# Patient Record
Sex: FEMALE | Race: WHITE | NOT HISPANIC OR LATINO | Employment: PART TIME | URBAN - METROPOLITAN AREA
[De-identification: names, ages, dates, MRNs, and addresses within clinical notes are randomized per-mention and may not be internally consistent; named-entity substitution may affect disease eponyms.]

---

## 2018-06-07 ENCOUNTER — APPOINTMENT (EMERGENCY)
Dept: CT IMAGING | Facility: HOSPITAL | Age: 53
End: 2018-06-07
Payer: COMMERCIAL

## 2018-06-07 ENCOUNTER — HOSPITAL ENCOUNTER (EMERGENCY)
Facility: HOSPITAL | Age: 53
Discharge: HOME | End: 2018-06-07
Attending: EMERGENCY MEDICINE | Admitting: EMERGENCY MEDICINE
Payer: COMMERCIAL

## 2018-06-07 VITALS
SYSTOLIC BLOOD PRESSURE: 130 MMHG | HEART RATE: 72 BPM | TEMPERATURE: 98.4 F | BODY MASS INDEX: 30.34 KG/M2 | OXYGEN SATURATION: 99 % | DIASTOLIC BLOOD PRESSURE: 71 MMHG | RESPIRATION RATE: 18 BRPM | WEIGHT: 177.69 LBS | HEIGHT: 64 IN

## 2018-06-07 DIAGNOSIS — K57.92 ACUTE DIVERTICULITIS: Primary | ICD-10-CM

## 2018-06-07 LAB
ALBUMIN SERPL BCP-MCNC: 3.9 G/DL (ref 3.5–5)
ALP SERPL-CCNC: 91 U/L (ref 46–116)
ALT SERPL W P-5'-P-CCNC: 26 U/L (ref 12–78)
ANION GAP SERPL CALCULATED.3IONS-SCNC: 8 MMOL/L (ref 4–13)
AST SERPL W P-5'-P-CCNC: 13 U/L (ref 5–45)
BACTERIA UR QL AUTO: ABNORMAL /HPF
BASOPHILS # BLD AUTO: 0.02 THOUSANDS/ΜL (ref 0–0.1)
BASOPHILS NFR BLD AUTO: 0 % (ref 0–1)
BILIRUB SERPL-MCNC: 0.3 MG/DL (ref 0.2–1)
BILIRUB UR QL STRIP: NEGATIVE
BUN SERPL-MCNC: 20 MG/DL (ref 5–25)
CALCIUM SERPL-MCNC: 9.4 MG/DL (ref 8.3–10.1)
CHLORIDE SERPL-SCNC: 105 MMOL/L (ref 100–108)
CLARITY UR: CLEAR
CO2 SERPL-SCNC: 29 MMOL/L (ref 21–32)
COLOR UR: YELLOW
CREAT SERPL-MCNC: 0.86 MG/DL (ref 0.6–1.3)
EOSINOPHIL # BLD AUTO: 0.12 THOUSAND/ΜL (ref 0–0.61)
EOSINOPHIL NFR BLD AUTO: 2 % (ref 0–6)
ERYTHROCYTE [DISTWIDTH] IN BLOOD BY AUTOMATED COUNT: 12.7 % (ref 11.6–15.1)
GFR SERPL CREATININE-BSD FRML MDRD: 78 ML/MIN/1.73SQ M
GLUCOSE SERPL-MCNC: 94 MG/DL (ref 65–140)
GLUCOSE UR STRIP-MCNC: NEGATIVE MG/DL
HCT VFR BLD AUTO: 37.3 % (ref 34.8–46.1)
HGB BLD-MCNC: 12.5 G/DL (ref 11.5–15.4)
HGB UR QL STRIP.AUTO: ABNORMAL
KETONES UR STRIP-MCNC: NEGATIVE MG/DL
LEUKOCYTE ESTERASE UR QL STRIP: NEGATIVE
LYMPHOCYTES # BLD AUTO: 1.23 THOUSANDS/ΜL (ref 0.6–4.47)
LYMPHOCYTES NFR BLD AUTO: 17 % (ref 14–44)
MCH RBC QN AUTO: 29.8 PG (ref 26.8–34.3)
MCHC RBC AUTO-ENTMCNC: 33.5 G/DL (ref 31.4–37.4)
MCV RBC AUTO: 89 FL (ref 82–98)
MONOCYTES # BLD AUTO: 0.53 THOUSAND/ΜL (ref 0.17–1.22)
MONOCYTES NFR BLD AUTO: 7 % (ref 4–12)
NEUTROPHILS # BLD AUTO: 5.26 THOUSANDS/ΜL (ref 1.85–7.62)
NEUTS SEG NFR BLD AUTO: 73 % (ref 43–75)
NITRITE UR QL STRIP: NEGATIVE
NON-SQ EPI CELLS URNS QL MICRO: ABNORMAL /HPF
PH UR STRIP.AUTO: 7 [PH] (ref 4.5–8)
PLATELET # BLD AUTO: 256 THOUSANDS/UL (ref 149–390)
PMV BLD AUTO: 9.7 FL (ref 8.9–12.7)
POTASSIUM SERPL-SCNC: 4.1 MMOL/L (ref 3.5–5.3)
PROT SERPL-MCNC: 7.6 G/DL (ref 6.4–8.2)
PROT UR STRIP-MCNC: NEGATIVE MG/DL
RBC # BLD AUTO: 4.2 MILLION/UL (ref 3.81–5.12)
RBC #/AREA URNS AUTO: ABNORMAL /HPF
SODIUM SERPL-SCNC: 142 MMOL/L (ref 136–145)
SP GR UR STRIP.AUTO: 1.01 (ref 1–1.03)
UROBILINOGEN UR QL STRIP.AUTO: 0.2 E.U./DL
WBC # BLD AUTO: 7.16 THOUSAND/UL (ref 4.31–10.16)
WBC #/AREA URNS AUTO: ABNORMAL /HPF

## 2018-06-07 PROCEDURE — 85025 COMPLETE CBC W/AUTO DIFF WBC: CPT | Performed by: EMERGENCY MEDICINE

## 2018-06-07 PROCEDURE — 81001 URINALYSIS AUTO W/SCOPE: CPT

## 2018-06-07 PROCEDURE — 99284 EMERGENCY DEPT VISIT MOD MDM: CPT

## 2018-06-07 PROCEDURE — 80053 COMPREHEN METABOLIC PANEL: CPT | Performed by: EMERGENCY MEDICINE

## 2018-06-07 PROCEDURE — 36415 COLL VENOUS BLD VENIPUNCTURE: CPT | Performed by: EMERGENCY MEDICINE

## 2018-06-07 PROCEDURE — 74177 CT ABD & PELVIS W/CONTRAST: CPT

## 2018-06-07 PROCEDURE — 96374 THER/PROPH/DIAG INJ IV PUSH: CPT

## 2018-06-07 RX ORDER — LEVOFLOXACIN 500 MG/1
500 TABLET, FILM COATED ORAL DAILY
Qty: 7 TABLET | Refills: 0 | Status: SHIPPED | OUTPATIENT
Start: 2018-06-07 | End: 2018-06-14

## 2018-06-07 RX ORDER — KETOROLAC TROMETHAMINE 30 MG/ML
15 INJECTION, SOLUTION INTRAMUSCULAR; INTRAVENOUS ONCE
Status: COMPLETED | OUTPATIENT
Start: 2018-06-07 | End: 2018-06-07

## 2018-06-07 RX ORDER — METRONIDAZOLE 500 MG/1
500 TABLET ORAL 3 TIMES DAILY
Qty: 21 TABLET | Refills: 0 | Status: SHIPPED | OUTPATIENT
Start: 2018-06-07 | End: 2018-06-14

## 2018-06-07 RX ORDER — TRAMADOL HYDROCHLORIDE 50 MG/1
50 TABLET ORAL EVERY 6 HOURS PRN
Qty: 15 TABLET | Refills: 0 | Status: SHIPPED | OUTPATIENT
Start: 2018-06-07 | End: 2018-08-07

## 2018-06-07 RX ADMIN — KETOROLAC TROMETHAMINE 15 MG: 30 INJECTION, SOLUTION INTRAMUSCULAR at 20:19

## 2018-06-07 RX ADMIN — IOHEXOL 100 ML: 350 INJECTION, SOLUTION INTRAVENOUS at 20:58

## 2018-06-08 NOTE — ED PROVIDER NOTES
History  Chief Complaint   Patient presents with    Abdominal Pain     HX of diverticulitis, +nausea, left ABD pain x 3day     49-year-old female presents with chief complaint of left lower quadrant abdominal pain  Onset was yesterday  Patient states she has a history of diverticulitis but this feels a little bit worse  Patient did take a dose of levofloxacin and metronidazole at home because she unfortunately has multiple flares of diverticulitis  Patient states she did not eat anything that will kick off for diverticulitis  No back pain  No urinary complaints  Patient has had some nausea but no vomiting  History provided by:  Patient   used: No    Abdominal Pain   Pain location:  LLQ  Pain quality: aching    Pain radiates to:  Does not radiate  Pain severity:  Moderate  Onset quality:  Gradual  Duration:  2 days  Timing:  Constant  Progression:  Worsening  Chronicity:  New  Relieved by:  Nothing  Worsened by:  Nothing  Ineffective treatments: one dose of antibiotics  Associated symptoms: nausea    Associated symptoms: no anorexia, no chest pain, no chills, no constipation, no diarrhea, no dysuria, no fever, no shortness of breath and no vomiting        None       Past Medical History:   Diagnosis Date    Diverticulitis        Past Surgical History:   Procedure Laterality Date    BREAST IMPLANT       SECTION       SECTION      EYE SURGERY      biklat eyelid surgery    HYSTERECTOMY      LAPAROSCOPIC LYSIS INTESTINAL ADHESIONS         History reviewed  No pertinent family history  I have reviewed and agree with the history as documented  Social History   Substance Use Topics    Smoking status: Never Smoker    Smokeless tobacco: Never Used    Alcohol use No        Review of Systems   Constitutional: Negative for chills, diaphoresis and fever  Respiratory: Negative for shortness of breath  Cardiovascular: Negative for chest pain and palpitations  Gastrointestinal: Positive for abdominal pain and nausea  Negative for anorexia, constipation, diarrhea and vomiting  Genitourinary: Negative for dysuria and frequency  Skin: Negative for rash  All other systems reviewed and are negative  Physical Exam  Physical Exam   Constitutional: She is oriented to person, place, and time  She appears well-developed and well-nourished  She appears distressed (mild)  HENT:   Head: Normocephalic and atraumatic  Eyes: EOM are normal  Pupils are equal, round, and reactive to light  Neck: Normal range of motion  No JVD present  Cardiovascular: Normal rate, regular rhythm, normal heart sounds and intact distal pulses  Exam reveals no gallop and no friction rub  No murmur heard  Pulmonary/Chest: Effort normal and breath sounds normal  No respiratory distress  She has no wheezes  She has no rales  She exhibits no tenderness  Abdominal: Soft  She exhibits no distension  There is tenderness (left lower quadrant)  There is no guarding  Musculoskeletal: Normal range of motion  She exhibits no tenderness  Neurological: She is alert and oriented to person, place, and time  Skin: Skin is warm and dry  Psychiatric: She has a normal mood and affect  Her behavior is normal  Judgment and thought content normal    Nursing note and vitals reviewed        Vital Signs  ED Triage Vitals [06/07/18 1921]   Temperature Pulse Respirations Blood Pressure SpO2   98 4 °F (36 9 °C) 98 18 140/75 98 %      Temp Source Heart Rate Source Patient Position - Orthostatic VS BP Location FiO2 (%)   Oral Monitor Sitting Left arm --      Pain Score       8           Vitals:    06/07/18 1921 06/07/18 2110 06/07/18 2139   BP: 140/75 125/73 130/71   Pulse: 98 71 72   Patient Position - Orthostatic VS: Sitting Sitting Sitting       Visual Acuity      ED Medications  Medications   ketorolac (TORADOL) injection 15 mg (15 mg Intravenous Given 6/7/18 2019)   iohexol (OMNIPAQUE) 350 MG/ML injection (MULTI-DOSE) 100 mL (100 mL Intravenous Given 6/7/18 2058)       Diagnostic Studies  Results Reviewed     Procedure Component Value Units Date/Time    Comprehensive metabolic panel [[id number]] Collected:  06/07/18 2022    Lab Status:  Final result Specimen:  Blood from Arm, Right Updated:  06/07/18 2049     Sodium 142 mmol/L      Potassium 4 1 mmol/L      Chloride 105 mmol/L      CO2 29 mmol/L      Anion Gap 8 mmol/L      BUN 20 mg/dL      Creatinine 0 86 mg/dL      Glucose 94 mg/dL      Calcium 9 4 mg/dL      AST 13 U/L      ALT 26 U/L      Alkaline Phosphatase 91 U/L      Total Protein 7 6 g/dL      Albumin 3 9 g/dL      Total Bilirubin 0 30 mg/dL      eGFR 78 ml/min/1 73sq m     Narrative:         National Kidney Disease Education Program recommendations are as follows:  GFR calculation is accurate only with a steady state creatinine  Chronic Kidney disease less than 60 ml/min/1 73 sq  meters  Kidney failure less than 15 ml/min/1 73 sq  meters      CBC and differential [[id number]]  (Normal) Collected:  06/07/18 2022    Lab Status:  Final result Specimen:  Blood from Arm, Right Updated:  06/07/18 2027     WBC 7 16 Thousand/uL      RBC 4 20 Million/uL      Hemoglobin 12 5 g/dL      Hematocrit 37 3 %      MCV 89 fL      MCH 29 8 pg      MCHC 33 5 g/dL      RDW 12 7 %      MPV 9 7 fL      Platelets 147 Thousands/uL      Neutrophils Relative 73 %      Lymphocytes Relative 17 %      Monocytes Relative 7 %      Eosinophils Relative 2 %      Basophils Relative 0 %      Neutrophils Absolute 5 26 Thousands/µL      Lymphocytes Absolute 1 23 Thousands/µL      Monocytes Absolute 0 53 Thousand/µL      Eosinophils Absolute 0 12 Thousand/µL      Basophils Absolute 0 02 Thousands/µL     Urine Microscopic [[id number]]  (Abnormal) Collected:  06/07/18 2016    Lab Status:  Final result Specimen:  Urine from Urine, Clean Catch Updated:  06/07/18 2025     RBC, UA 0-1 (A) /hpf      WBC, UA None Seen /hpf      Epithelial Cells None Seen /hpf      Bacteria, UA Occasional /hpf     ED Urine Macroscopic [[id number]]  (Abnormal) Collected:  06/07/18 2016    Lab Status:  Final result Specimen:  Urine Updated:  06/07/18 2011     Color, UA Yellow     Clarity, UA Clear     pH, UA 7 0     Leukocytes, UA Negative     Nitrite, UA Negative     Protein, UA Negative mg/dl      Glucose, UA Negative mg/dl      Ketones, UA Negative mg/dl      Urobilinogen, UA 0 2 E U /dl      Bilirubin, UA Negative     Blood, UA Trace (A)     Specific [place], UA 1 015    Narrative:       CLINITEK RESULT                 CT abdomen pelvis with contrast   Final Result by Claudia Beltran MD (06/07 2124)      Segment of mild colonic wall thickening and pericolonic inflammatory stranding involving the distal descending colon with multiple associated diverticula  The findings are most compatible with acute diverticulitis  There is no free air or free fluid to    suggest a perforation  Workstation performed: [id number]                    Procedures  Procedures       Phone Contacts  ED Phone Contact    ED Course                               MDM  Number of Diagnoses or Management Options  Acute diverticulitis: new and requires workup  Diagnosis management comments: Background: 46 y o  female presents with chief complaint of lower abdominal pain       Differential: appendicitis, diverticulitis, mesenteric adenitis, ovarian cyst, cystitis, pyelonephritis, ureterolithiasis, constipation, colitis, gastric ulcer, mesenteric ischemia, perforated viscous, non specific abdominal pain    Plan: cbc, cmp, lipase, urinalysis, ct scan, symptom control          Amount and/or Complexity of Data Reviewed  Clinical lab tests: ordered and reviewed  Tests in the radiology section of CPT®: reviewed and ordered    Risk of Complications, Morbidity, and/or Mortality  Presenting problems: high  Diagnostic procedures: high  Management options: high    Patient Progress  Patient progress: stable    CritCare Time    Disposition  Final diagnoses:   Acute diverticulitis     Time reflects when diagnosis was documented in both MDM as applicable and the Disposition within this note     Time User Action Codes Description Comment    6/7/2018  9:29 PM Ge Hsieh [K57 92] Acute diverticulitis       ED Disposition     ED Disposition Condition Comment    Discharge  Marcos Door discharge to home/self care  Condition at discharge: Good        Follow-up Information    None         Discharge Medication List as of 6/7/2018  9:31 PM      START taking these medications    Details   levofloxacin (LEVAQUIN) 500 mg tablet Take 1 tablet (500 mg total) by mouth daily for 7 days, Starting u 6/7/2018, Until u 6/14/2018, Print      metroNIDAZOLE (FLAGYL) 500 mg tablet Take 1 tablet (500 mg total) by mouth 3 (three) times a day for 7 days, Starting u 6/7/2018, Until u 6/14/2018, Print      traMADol (ULTRAM) 50 mg tablet Take 1 tablet (50 mg total) by mouth every 6 (six) hours as needed for moderate pain for up to 15 doses, Starting u 6/7/2018, Print           No discharge procedures on file      ED Provider  Electronically Signed by           Jodie Orellana MD  06/08/18 5486

## 2018-06-08 NOTE — DISCHARGE INSTRUCTIONS
Diverticulitis   WHAT YOU NEED TO KNOW:   Diverticulitis is a condition that causes small pockets along your intestine called diverticula to become inflamed or infected  This is caused by hard bowel movements, food, or bacteria that get stuck in the pockets  DISCHARGE INSTRUCTIONS:   Return to the emergency department if:   · You have bowel movement or foul-smelling discharge leaking from your vagina or in your urine  · You have severe diarrhea  · You urinate less than usual or not at all  · You are not able to have a bowel movement  · You cannot stop vomiting  · You have severe abdominal pain, a fever, and your abdomen is larger than usual      · You have new or increased blood in your bowel movements  Contact your healthcare provider if:   · You have pain when you urinate  · Your symptoms get worse or do not go away  · You have questions or concerns about your condition or care  Medicines:   · Antibiotics  may be given to help treat a bacterial infection  · Prescription pain medicine  may be given  Ask your healthcare provider how to take this medicine safely  Some prescription pain medicines contain acetaminophen  Do not take other medicines that contain acetaminophen without talking to your healthcare provider  Too much acetaminophen may cause liver damage  Prescription pain medicine may cause constipation  Ask your healthcare provider how to prevent or treat constipation  · Take your medicine as directed  Contact your healthcare provider if you think your medicine is not helping or if you have side effects  Tell him or her if you are allergic to any medicine  Keep a list of the medicines, vitamins, and herbs you take  Include the amounts, and when and why you take them  Bring the list or the pill bottles to follow-up visits  Carry your medicine list with you in case of an emergency  Clear liquid diet:  A clear liquid diet includes any liquids that you can see through  Examples include water, ginger-deja, cranberry or apple juice, frozen fruit ice, or broth  Stay on a clear liquid diet until your symptoms are gone, or as directed  Follow up with your healthcare provider as directed: You may need to return for a colonoscopy  When your symptoms are gone, you may need a low-fat, high-fiber diet to prevent diverticulitis from developing again  Your healthcare provider or dietitian can help you create meal plans  Write down your questions so you remember to ask them during your visits  © 2017 [facility] Information is for End User's use only and may not be sold, redistributed or otherwise used for commercial purposes  All illustrations and images included in CareNotes® are the copyrighted property of [employer] , [employer]  or [employer] Nicholson  The above information is an  only  It is not intended as medical advice for individual conditions or treatments  Talk to your doctor, nurse or pharmacist before following any medical regimen to see if it is safe and effective for you

## 2018-07-26 ENCOUNTER — ANESTHESIA EVENT (OUTPATIENT)
Dept: PERIOP | Facility: HOSPITAL | Age: 53
DRG: 331 | End: 2018-07-26
Payer: COMMERCIAL

## 2018-07-28 ENCOUNTER — TRANSCRIBE ORDERS (OUTPATIENT)
Dept: ADMINISTRATIVE | Facility: HOSPITAL | Age: 53
End: 2018-07-28

## 2018-07-28 ENCOUNTER — APPOINTMENT (OUTPATIENT)
Dept: LAB | Facility: HOSPITAL | Age: 53
End: 2018-07-28
Attending: COLON & RECTAL SURGERY
Payer: COMMERCIAL

## 2018-07-28 DIAGNOSIS — K57.92 DIVERTICULITIS: Primary | ICD-10-CM

## 2018-07-28 DIAGNOSIS — K57.92 DIVERTICULITIS: ICD-10-CM

## 2018-07-28 LAB
BASOPHILS # BLD AUTO: 0.04 THOUSANDS/ΜL (ref 0–0.1)
BASOPHILS NFR BLD AUTO: 1 % (ref 0–1)
EOSINOPHIL # BLD AUTO: 0.15 THOUSAND/ΜL (ref 0–0.61)
EOSINOPHIL NFR BLD AUTO: 2 % (ref 0–6)
ERYTHROCYTE [DISTWIDTH] IN BLOOD BY AUTOMATED COUNT: 12.3 % (ref 11.6–15.1)
EST. AVERAGE GLUCOSE BLD GHB EST-MCNC: 126 MG/DL
HBA1C MFR BLD: 6 % (ref 4.2–6.3)
HCT VFR BLD AUTO: 40.5 % (ref 34.8–46.1)
HGB BLD-MCNC: 12.7 G/DL (ref 11.5–15.4)
IMM GRANULOCYTES # BLD AUTO: 0.02 THOUSAND/UL (ref 0–0.2)
IMM GRANULOCYTES NFR BLD AUTO: 0 % (ref 0–2)
LYMPHOCYTES # BLD AUTO: 1.27 THOUSANDS/ΜL (ref 0.6–4.47)
LYMPHOCYTES NFR BLD AUTO: 19 % (ref 14–44)
MCH RBC QN AUTO: 28.4 PG (ref 26.8–34.3)
MCHC RBC AUTO-ENTMCNC: 31.4 G/DL (ref 31.4–37.4)
MCV RBC AUTO: 91 FL (ref 82–98)
MONOCYTES # BLD AUTO: 0.47 THOUSAND/ΜL (ref 0.17–1.22)
MONOCYTES NFR BLD AUTO: 7 % (ref 4–12)
NEUTROPHILS # BLD AUTO: 4.86 THOUSANDS/ΜL (ref 1.85–7.62)
NEUTS SEG NFR BLD AUTO: 71 % (ref 43–75)
NRBC BLD AUTO-RTO: 0 /100 WBCS
PLATELET # BLD AUTO: 273 THOUSANDS/UL (ref 149–390)
PMV BLD AUTO: 9.9 FL (ref 8.9–12.7)
RBC # BLD AUTO: 4.47 MILLION/UL (ref 3.81–5.12)
WBC # BLD AUTO: 6.81 THOUSAND/UL (ref 4.31–10.16)

## 2018-07-28 PROCEDURE — 85025 COMPLETE CBC W/AUTO DIFF WBC: CPT

## 2018-07-28 PROCEDURE — 93005 ELECTROCARDIOGRAM TRACING: CPT

## 2018-07-28 PROCEDURE — 36415 COLL VENOUS BLD VENIPUNCTURE: CPT

## 2018-07-28 PROCEDURE — 83036 HEMOGLOBIN GLYCOSYLATED A1C: CPT

## 2018-07-30 LAB
ATRIAL RATE: 62 BPM
P AXIS: 32 DEGREES
PR INTERVAL: 150 MS
QRS AXIS: 55 DEGREES
QRSD INTERVAL: 90 MS
QT INTERVAL: 398 MS
QTC INTERVAL: 403 MS
T WAVE AXIS: 50 DEGREES
VENTRICULAR RATE: 62 BPM

## 2018-07-30 PROCEDURE — 93010 ELECTROCARDIOGRAM REPORT: CPT | Performed by: INTERNAL MEDICINE

## 2018-08-07 RX ORDER — LISINOPRIL 20 MG/1
20 TABLET ORAL DAILY
COMMUNITY
End: 2020-05-29

## 2018-08-07 NOTE — PRE-PROCEDURE INSTRUCTIONS
Pre-Surgery Instructions:   Medication Instructions    lisinopril (ZESTRIL) 20 mg tablet Instructed patient per Anesthesia Guidelines  Do not take this medication the day before and the morning of the day of surgery/procedure

## 2018-08-07 NOTE — PRE-PROCEDURE INSTRUCTIONS
Pre-Surgery Instructions:   Medication Instructions    lisinopril (ZESTRIL) 20 mg tablet Instructed patient per Anesthesia Guidelines

## 2018-08-21 ENCOUNTER — APPOINTMENT (OUTPATIENT)
Dept: LAB | Facility: HOSPITAL | Age: 53
End: 2018-08-21
Attending: COLON & RECTAL SURGERY
Payer: COMMERCIAL

## 2018-08-21 ENCOUNTER — TRANSCRIBE ORDERS (OUTPATIENT)
Dept: ADMINISTRATIVE | Facility: HOSPITAL | Age: 53
End: 2018-08-21

## 2018-08-21 ENCOUNTER — LAB REQUISITION (OUTPATIENT)
Dept: LAB | Facility: HOSPITAL | Age: 53
End: 2018-08-21
Payer: COMMERCIAL

## 2018-08-21 DIAGNOSIS — K57.92 DIVERTICULITIS: Primary | ICD-10-CM

## 2018-08-21 DIAGNOSIS — K57.92 DIVERTICULITIS: ICD-10-CM

## 2018-08-21 DIAGNOSIS — K57.92 DIVERTICULITIS OF INTESTINE WITHOUT PERFORATION OR ABSCESS WITHOUT BLEEDING: ICD-10-CM

## 2018-08-21 LAB
ABO GROUP BLD: NORMAL
ALBUMIN SERPL BCP-MCNC: 3.7 G/DL (ref 3.5–5)
ALP SERPL-CCNC: 81 U/L (ref 46–116)
ALT SERPL W P-5'-P-CCNC: 27 U/L (ref 12–78)
ANION GAP SERPL CALCULATED.3IONS-SCNC: 6 MMOL/L (ref 4–13)
AST SERPL W P-5'-P-CCNC: 21 U/L (ref 5–45)
BILIRUB SERPL-MCNC: 0.4 MG/DL (ref 0.2–1)
BLD GP AB SCN SERPL QL: NEGATIVE
BUN SERPL-MCNC: 17 MG/DL (ref 5–25)
CALCIUM SERPL-MCNC: 9.3 MG/DL (ref 8.3–10.1)
CHLORIDE SERPL-SCNC: 104 MMOL/L (ref 100–108)
CO2 SERPL-SCNC: 30 MMOL/L (ref 21–32)
CREAT SERPL-MCNC: 0.99 MG/DL (ref 0.6–1.3)
GFR SERPL CREATININE-BSD FRML MDRD: 66 ML/MIN/1.73SQ M
GLUCOSE P FAST SERPL-MCNC: 97 MG/DL (ref 65–99)
POTASSIUM SERPL-SCNC: 4 MMOL/L (ref 3.5–5.3)
PROT SERPL-MCNC: 7.1 G/DL (ref 6.4–8.2)
RH BLD: POSITIVE
SODIUM SERPL-SCNC: 140 MMOL/L (ref 136–145)
SPECIMEN EXPIRATION DATE: NORMAL

## 2018-08-21 PROCEDURE — 36415 COLL VENOUS BLD VENIPUNCTURE: CPT

## 2018-08-21 PROCEDURE — 86850 RBC ANTIBODY SCREEN: CPT | Performed by: COLON & RECTAL SURGERY

## 2018-08-21 PROCEDURE — 86900 BLOOD TYPING SEROLOGIC ABO: CPT | Performed by: COLON & RECTAL SURGERY

## 2018-08-21 PROCEDURE — 86901 BLOOD TYPING SEROLOGIC RH(D): CPT | Performed by: COLON & RECTAL SURGERY

## 2018-08-21 PROCEDURE — 80053 COMPREHEN METABOLIC PANEL: CPT

## 2018-08-24 ENCOUNTER — HOSPITAL ENCOUNTER (INPATIENT)
Facility: HOSPITAL | Age: 53
LOS: 3 days | Discharge: HOME | DRG: 331 | End: 2018-08-27
Attending: COLON & RECTAL SURGERY | Admitting: COLON & RECTAL SURGERY
Payer: COMMERCIAL

## 2018-08-24 ENCOUNTER — ANESTHESIA (OUTPATIENT)
Dept: PERIOP | Facility: HOSPITAL | Age: 53
DRG: 331 | End: 2018-08-24
Payer: COMMERCIAL

## 2018-08-24 DIAGNOSIS — K57.92 DIVERTICULITIS OF INTESTINE WITHOUT PERFORATION OR ABSCESS WITHOUT BLEEDING: ICD-10-CM

## 2018-08-24 DIAGNOSIS — K57.32 DIVERTICULITIS LARGE INTESTINE W/O PERFORATION OR ABSCESS W/O BLEEDING: Primary | ICD-10-CM

## 2018-08-24 LAB — GLUCOSE SERPL-MCNC: 161 MG/DL (ref 65–140)

## 2018-08-24 PROCEDURE — 94760 N-INVAS EAR/PLS OXIMETRY 1: CPT

## 2018-08-24 PROCEDURE — 88307 TISSUE EXAM BY PATHOLOGIST: CPT | Performed by: PATHOLOGY

## 2018-08-24 PROCEDURE — 94762 N-INVAS EAR/PLS OXIMTRY CONT: CPT

## 2018-08-24 PROCEDURE — 82948 REAGENT STRIP/BLOOD GLUCOSE: CPT

## 2018-08-24 PROCEDURE — 0DTN4ZZ RESECTION OF SIGMOID COLON, PERCUTANEOUS ENDOSCOPIC APPROACH: ICD-10-PCS | Performed by: COLON & RECTAL SURGERY

## 2018-08-24 RX ORDER — ONDANSETRON 2 MG/ML
4 INJECTION INTRAMUSCULAR; INTRAVENOUS EVERY 6 HOURS PRN
Status: DISCONTINUED | OUTPATIENT
Start: 2018-08-24 | End: 2018-08-27 | Stop reason: HOSPADM

## 2018-08-24 RX ORDER — FENTANYL CITRATE/PF 50 MCG/ML
25 SYRINGE (ML) INJECTION
Status: DISCONTINUED | OUTPATIENT
Start: 2018-08-24 | End: 2018-08-24

## 2018-08-24 RX ORDER — SODIUM CHLORIDE 9 MG/ML
INJECTION, SOLUTION INTRAVENOUS CONTINUOUS PRN
Status: DISCONTINUED | OUTPATIENT
Start: 2018-08-24 | End: 2018-08-24 | Stop reason: SURG

## 2018-08-24 RX ORDER — SODIUM CHLORIDE, SODIUM LACTATE, POTASSIUM CHLORIDE, CALCIUM CHLORIDE 600; 310; 30; 20 MG/100ML; MG/100ML; MG/100ML; MG/100ML
125 INJECTION, SOLUTION INTRAVENOUS CONTINUOUS
Status: DISCONTINUED | OUTPATIENT
Start: 2018-08-24 | End: 2018-08-24

## 2018-08-24 RX ORDER — ONDANSETRON 2 MG/ML
INJECTION INTRAMUSCULAR; INTRAVENOUS AS NEEDED
Status: DISCONTINUED | OUTPATIENT
Start: 2018-08-24 | End: 2018-08-24 | Stop reason: SURG

## 2018-08-24 RX ORDER — SODIUM CHLORIDE 9 MG/ML
100 INJECTION, SOLUTION INTRAVENOUS CONTINUOUS
Status: DISCONTINUED | OUTPATIENT
Start: 2018-08-24 | End: 2018-08-25

## 2018-08-24 RX ORDER — ROCURONIUM BROMIDE 10 MG/ML
INJECTION, SOLUTION INTRAVENOUS AS NEEDED
Status: DISCONTINUED | OUTPATIENT
Start: 2018-08-24 | End: 2018-08-24 | Stop reason: SURG

## 2018-08-24 RX ORDER — MIDAZOLAM HYDROCHLORIDE 1 MG/ML
INJECTION INTRAMUSCULAR; INTRAVENOUS AS NEEDED
Status: DISCONTINUED | OUTPATIENT
Start: 2018-08-24 | End: 2018-08-24 | Stop reason: SURG

## 2018-08-24 RX ORDER — PROMETHAZINE HYDROCHLORIDE 25 MG/ML
25 INJECTION, SOLUTION INTRAMUSCULAR; INTRAVENOUS ONCE AS NEEDED
Status: DISCONTINUED | OUTPATIENT
Start: 2018-08-24 | End: 2018-08-24

## 2018-08-24 RX ORDER — SODIUM CHLORIDE, SODIUM LACTATE, POTASSIUM CHLORIDE, CALCIUM CHLORIDE 600; 310; 30; 20 MG/100ML; MG/100ML; MG/100ML; MG/100ML
20 INJECTION, SOLUTION INTRAVENOUS CONTINUOUS
Status: DISCONTINUED | OUTPATIENT
Start: 2018-08-24 | End: 2018-08-25

## 2018-08-24 RX ORDER — GLYCOPYRROLATE 0.2 MG/ML
INJECTION INTRAMUSCULAR; INTRAVENOUS AS NEEDED
Status: DISCONTINUED | OUTPATIENT
Start: 2018-08-24 | End: 2018-08-24 | Stop reason: SURG

## 2018-08-24 RX ORDER — PROMETHAZINE HYDROCHLORIDE 25 MG/ML
25 INJECTION, SOLUTION INTRAMUSCULAR; INTRAVENOUS ONCE AS NEEDED
Status: DISCONTINUED | OUTPATIENT
Start: 2018-08-24 | End: 2018-08-24 | Stop reason: HOSPADM

## 2018-08-24 RX ORDER — FENTANYL CITRATE/PF 50 MCG/ML
25 SYRINGE (ML) INJECTION
Status: DISCONTINUED | OUTPATIENT
Start: 2018-08-24 | End: 2018-08-24 | Stop reason: HOSPADM

## 2018-08-24 RX ORDER — HEPARIN SODIUM 5000 [USP'U]/ML
5000 INJECTION, SOLUTION INTRAVENOUS; SUBCUTANEOUS ONCE
Status: COMPLETED | OUTPATIENT
Start: 2018-08-24 | End: 2018-08-24

## 2018-08-24 RX ORDER — LIDOCAINE HYDROCHLORIDE 10 MG/ML
INJECTION, SOLUTION INFILTRATION; PERINEURAL AS NEEDED
Status: DISCONTINUED | OUTPATIENT
Start: 2018-08-24 | End: 2018-08-24 | Stop reason: SURG

## 2018-08-24 RX ORDER — FENTANYL CITRATE 50 UG/ML
INJECTION, SOLUTION INTRAMUSCULAR; INTRAVENOUS AS NEEDED
Status: DISCONTINUED | OUTPATIENT
Start: 2018-08-24 | End: 2018-08-24 | Stop reason: SURG

## 2018-08-24 RX ORDER — MAGNESIUM HYDROXIDE 1200 MG/15ML
LIQUID ORAL AS NEEDED
Status: DISCONTINUED | OUTPATIENT
Start: 2018-08-24 | End: 2018-08-24 | Stop reason: HOSPADM

## 2018-08-24 RX ORDER — PROPOFOL 10 MG/ML
INJECTION, EMULSION INTRAVENOUS AS NEEDED
Status: DISCONTINUED | OUTPATIENT
Start: 2018-08-24 | End: 2018-08-24 | Stop reason: SURG

## 2018-08-24 RX ADMIN — HYDROMORPHONE HYDROCHLORIDE 0.5 MG: 1 INJECTION, SOLUTION INTRAMUSCULAR; INTRAVENOUS; SUBCUTANEOUS at 17:06

## 2018-08-24 RX ADMIN — MIDAZOLAM 2 MG: 1 INJECTION INTRAMUSCULAR; INTRAVENOUS at 11:48

## 2018-08-24 RX ADMIN — ROCURONIUM BROMIDE 40 MG: 10 INJECTION INTRAVENOUS at 11:52

## 2018-08-24 RX ADMIN — NEOSTIGMINE METHYLSULFATE 3 MG: 1 INJECTION, SOLUTION INTRAMUSCULAR; INTRAVENOUS; SUBCUTANEOUS at 15:49

## 2018-08-24 RX ADMIN — FENTANYL CITRATE 25 MCG: 50 INJECTION INTRAMUSCULAR; INTRAVENOUS at 16:45

## 2018-08-24 RX ADMIN — ROCURONIUM BROMIDE 10 MG: 10 INJECTION INTRAVENOUS at 14:27

## 2018-08-24 RX ADMIN — SODIUM CHLORIDE, SODIUM LACTATE, POTASSIUM CHLORIDE, AND CALCIUM CHLORIDE: .6; .31; .03; .02 INJECTION, SOLUTION INTRAVENOUS at 11:15

## 2018-08-24 RX ADMIN — HEPARIN SODIUM 5000 UNITS: 5000 INJECTION, SOLUTION INTRAVENOUS; SUBCUTANEOUS at 11:46

## 2018-08-24 RX ADMIN — FENTANYL CITRATE 50 MCG: 50 INJECTION, SOLUTION INTRAMUSCULAR; INTRAVENOUS at 14:44

## 2018-08-24 RX ADMIN — HYDROMORPHONE HYDROCHLORIDE: 10 INJECTION, SOLUTION INTRAMUSCULAR; INTRAVENOUS; SUBCUTANEOUS at 16:45

## 2018-08-24 RX ADMIN — FENTANYL CITRATE 50 MCG: 50 INJECTION, SOLUTION INTRAMUSCULAR; INTRAVENOUS at 16:20

## 2018-08-24 RX ADMIN — CEFAZOLIN SODIUM 1000 MG: 1 SOLUTION INTRAVENOUS at 16:01

## 2018-08-24 RX ADMIN — ROCURONIUM BROMIDE 5 MG: 10 INJECTION INTRAVENOUS at 15:03

## 2018-08-24 RX ADMIN — METRONIDAZOLE 500 MG: 500 INJECTION, SOLUTION INTRAVENOUS at 12:08

## 2018-08-24 RX ADMIN — HEPARIN SODIUM 5000 UNITS: 5000 INJECTION, SOLUTION INTRAVENOUS; SUBCUTANEOUS at 22:09

## 2018-08-24 RX ADMIN — SODIUM CHLORIDE, SODIUM LACTATE, POTASSIUM CHLORIDE, AND CALCIUM CHLORIDE 125 ML/HR: .6; .31; .03; .02 INJECTION, SOLUTION INTRAVENOUS at 09:41

## 2018-08-24 RX ADMIN — SODIUM CHLORIDE: 0.9 INJECTION, SOLUTION INTRAVENOUS at 14:51

## 2018-08-24 RX ADMIN — FENTANYL CITRATE 25 MCG: 50 INJECTION INTRAMUSCULAR; INTRAVENOUS at 16:50

## 2018-08-24 RX ADMIN — SODIUM CHLORIDE 100 ML/HR: 0.9 INJECTION, SOLUTION INTRAVENOUS at 17:20

## 2018-08-24 RX ADMIN — GLYCOPYRROLATE 0.4 MG: 0.2 INJECTION, SOLUTION INTRAMUSCULAR; INTRAVENOUS at 15:49

## 2018-08-24 RX ADMIN — ONDANSETRON 4 MG: 2 INJECTION INTRAMUSCULAR; INTRAVENOUS at 15:33

## 2018-08-24 RX ADMIN — SODIUM CHLORIDE: 0.9 INJECTION, SOLUTION INTRAVENOUS at 11:58

## 2018-08-24 RX ADMIN — ROCURONIUM BROMIDE 10 MG: 10 INJECTION INTRAVENOUS at 13:41

## 2018-08-24 RX ADMIN — FENTANYL CITRATE 25 MCG: 50 INJECTION INTRAMUSCULAR; INTRAVENOUS at 16:37

## 2018-08-24 RX ADMIN — FENTANYL CITRATE 50 MCG: 50 INJECTION, SOLUTION INTRAMUSCULAR; INTRAVENOUS at 12:15

## 2018-08-24 RX ADMIN — DEXAMETHASONE SODIUM PHOSPHATE 10 MG: 10 INJECTION INTRAMUSCULAR; INTRAVENOUS at 11:52

## 2018-08-24 RX ADMIN — CEFAZOLIN SODIUM 1000 MG: 1 SOLUTION INTRAVENOUS at 12:03

## 2018-08-24 RX ADMIN — PROPOFOL 180 MG: 10 INJECTION, EMULSION INTRAVENOUS at 11:52

## 2018-08-24 RX ADMIN — FENTANYL CITRATE 25 MCG: 50 INJECTION INTRAMUSCULAR; INTRAVENOUS at 16:30

## 2018-08-24 RX ADMIN — FENTANYL CITRATE 50 MCG: 50 INJECTION, SOLUTION INTRAMUSCULAR; INTRAVENOUS at 11:52

## 2018-08-24 RX ADMIN — LIDOCAINE HYDROCHLORIDE 50 MG: 10 INJECTION, SOLUTION INFILTRATION; PERINEURAL at 11:52

## 2018-08-24 NOTE — PERIOPERATIVE NURSING NOTE
PCA pump started, positive return demo noted  Patient needed to be woken from quiet snore to teach instructions, reporting current 10/10 pain  Patient went right back to sleep following instructions

## 2018-08-24 NOTE — OP NOTE
OPERATIVE REPORT  PATIENT NAME: Gail Haas    :  1965  MRN: [id number]  Pt Location: BE OR ROOM 09    SURGERY DATE: 2018    Surgeon(s) and Role:     * Geovanna Humphrey MD - Primary     * Miki Alvarado - Assisting    Preop Diagnosis:  Diverticulitis of intestine without perforation or abscess without bleeding [K57 92]    Post-Op Diagnosis Codes:     * Diverticulitis of intestine without perforation or abscess without bleeding [K57 92]  Extensive intraabdominal adhesions    Procedure(s) (LRB):  LAPAROSCOPIC SIGMOID RESECTION, MOBILIZATION OF splenic flexure (N/A)  SIGMOIDOSCOPY FLEXIBLE (N/A)   Lysis of adhesions requiring more than 50% of time of case    Specimen(s):  ID Type Source Tests Collected by Time Destination   1 :  Tissue Large Intestine, Sigmoid Colon TISSUE EXAM Geovanna Humphrey MD 2018 1254        Estimated Blood Loss:   60 mL    Drains:  Urethral Catheter Latex 16 Fr  (Active)   Number of days: 0       Anesthesia Type:   General    Operative Indications:  Diverticulitis of intestine without perforation or abscess without bleeding [K57 92]    Operative Findings:    * Diverticulitis of intestine without perforation or abscess without bleeding [K57 92]  Extensive intraabdominal adhesions      Complications:   None    Procedure and Technique:  Patient was placed in a supine position with her legs in [employer]  Her arms were positioned at her sides and were wrapped into position using draw sheet around double layers of soft egg crate material   Her arms were oriented in a position to avoid nerve injury  A bandolier draping and taping was done over egg crate material to prevent movement on the table during the operation  The abdomen was prepped widely using ChloraPrep and was allowed to dry for more than 3 minutes  She was draped in a sterile manner  A time-out was done  The procedure was initiated by placing an infraumbilical 11 millimeter trocar using Visiport technique  This was done without any difficulty whatsoever  Visualization of the surfaces in the abdomen was unremarkable  A 5 millimeter trocar was placed in the right lateral abdomen and a 12 millimeter trocar was placed in the right lower quadrant  Using these trocars we could see that there were extensive small-bowel adhesions to the sigmoid colon medially and to the pelvis itself  Lysis of adhesions was extensive and required more than 50 percent of the operative time to accomplish  Interloop adhesions, small bowel to the sigmoid colon adhesions, pelvic brim adhesions circumferentially, and adhesions to the mesentery of the sigmoid colon were extensive  No apparent injury to the bowel was made during the adhesiolysis  Once the lysis of adhesions was complete, we could more easily assess the sigmoid colon  We viewed the CT scan for reinforcement of the position of the diverticulitis that was present at the anterior superior iliac spine region  Above this, the bowel was completely soft and supple  Distal to it, there were thickened bowel surfaces and scarring related to the prior adhesions and previous surgery  Distal to the pelvic brim, the rectum was normal and free of adhesions  Part of the adhesions was related to a prior hysterectomy and this created that pelvic brim rim of adhesions  After dissection of the adhesions, the small bowel and sigmoid appeared to be relatively unremarkable  The left ureter was clearly seen during the operation was avoided  We never entered regions near the right ureter  A medial approach was used to dissect the superior rectal artery away from the retroperitoneal structures  The avascular plane was identified and was used to enter the superior retrorectal plane  Dissection was carried up to the takeoff of the superior rectal artery from the inferior mesenteric artery, widening the view of the left side of the retroperitoneum    The left ureter was identified and avoided during the operation  Lateral dissection was then done to reidentify the dissection plane  Using the avascular planes, dissection was carried into the pelvis in the superior retrorectal space and laterally down a few centimeters from the sacral promontory  Proximal dissection was carried up to the level of superior rectal artery at its takeoff from the inferior mesenteric artery  The vessel was skeletonized, surrounded, and divided using EnSeal technique with double fire on the EnSeal   No bleeding was encountered  Lateral dissection was then done up to the level of the proximal descending colon  There were small bowel loops that were persistently present in the left upper quadrant and I did not get an easy view of the splenic flexure  The bowel seemed redundant and I halted dissection on the lateral portion of the proximal descending colon  The sigmoid and descending colon were rotated medially and the retroperitoneal avascular space was  to lift the mesentery of the bowel to get as much length as possible on the distal bowel  The mesentery of the rectum was then transected at the sacral promontory using EnSeal technique  The bowel was exposed posteriorly and a clear view of the circumference of the rectum at the area just distal to the sacral promontory was obtained  The rectum was transected using a green loaded 60 millimeter echelon stapler  The staple line appeared to be intact  At this time a lower midline Pfannenstiel incision was reopened at the skin level and dissection was carried through the subcutaneous fat to the level of the fascia  The fascia was opened in a vertical plane along the lines of a prior incision  The abdomen was entered and the bowel was delivered through the wound after placing a GelPort device  A proximal end of the bowel was identified that was supple and unremarkable    The mesentery was sequentially divided and the anvil of a 29 millimeter [facility] stapler device was placed in the distal descending colon proximal to the level that had previously been at the anterior superior iliac spine  The anvil was tied into position using a triple wrap of 2 0 Prolene suture  A nice collar of full-thickness tissue was included in the wrap  The bowel was very soft and supple  The specimen was thereby resected and sent for pathologic evaluation  The bowel was repositioned inside the abdomen after cleansing it and we insufflation was obtained for creation of the anastomosis  A double stapled 29 millimeter [address] [employer] anastomosis was created without any difficulty and the donuts were very thick and circumferential on both sides when inspected  The some mild tension was suspected and I elected to create some more freedom by further dissection  The small bowel was difficult to the clear from the left upper quadrant and I felt it safer to perform this part of the procedure using a hand assist technique that would allow me to palpate the tension on the anastomosis at the same time  The midline lower abdominal incision was extended and the GelPort was repositioned  Hand assist technique was used to bluntly mobilize the splenic flexure retroperitoneal attachments more completely  Some attachments were  from the omentum and the splenic flexure was freed to deliver it more distally with no tension on the anastomosis  I did not need to divide the medial structures including the inferior mesenteric vein  The inferior mesenteric artery remained intact but did not create tension on the anastomosis at this time  The anastomosis was truly loose and floppy  We then performed an air test and flexible sigmoidoscopy which were unremarkable  The bowel proximal and distal to the anastomosis were well vascularized and the staple line was circumferentially intact  No tension on the anastomosis was present  No air leak was visible under water in the pelvis      The bowel was positioned in a fashion to avoid torsion or herniation and the small intestine was brought inferiorly as much as possible  The omentum was repositioned away from the inferior side of the transverse colon  No significant bleeding was present in the planes of dissection  The GelPort was removed and the wounds were irrigated and dried  The lower midline was closed using a running 1  PDS suture placed from either into the wound  The subcutaneous tissue at that Pfannenstiel incision was closed using 2 0 Vicryl and the skin was closed at all sites using running or interrupted 4 0 Monocryl suture depending on the length of the wound  Wounds were covered with Histoacryl  Sponge needle and instrument counts were correct at the conclusion of the operation  Wand technique was used to rule out any retained gauze  I was present for the entire procedure      Patient Disposition:  PACU     SIGNATURE: Winifred Connell MD  DATE: August 24, 2018  TIME: 3:54 PM

## 2018-08-24 NOTE — ANESTHESIA PREPROCEDURE EVALUATION
Review of Systems/Medical History      No history of anesthetic complications     Cardiovascular  Exercise tolerance (METS): >4,  Hypertension controlled, No angina , No KAUR,    Pulmonary  Negative pulmonary ROS        GI/Hepatic            Endo/Other     GYN       Hematology   Musculoskeletal       Neurology   Psychology           Physical Exam    Airway    Mallampati score: I  TM Distance: >3 FB  Neck ROM: full     Dental       Cardiovascular  Cardiovascular exam normal    Pulmonary  Breath sounds clear to auscultation,     Other Findings        Anesthesia Plan  ASA Score- 2     Anesthesia Type- general with ASA Monitors  Additional Monitors:   Airway Plan: ETT  Plan Factors-    Induction- intravenous  Postoperative Plan- Plan for postoperative opioid use  Informed Consent- Anesthetic plan and risks discussed with patient and spouse  I personally reviewed this patient with the CRNA  Discussed and agreed on the Anesthesia Plan with the CRNA  Sherre Soulier

## 2018-08-24 NOTE — ANESTHESIA POSTPROCEDURE EVALUATION
Post-Op Assessment Note      CV Status:  Stable    Mental Status:  Alert and awake    Hydration Status:  Euvolemic    PONV Controlled:  Controlled    Airway Patency:  Patent    Post Op Vitals Reviewed: Yes          Staff: CRNA           /72 (08/24/18 1618)    Temp (!) 97 4 °F (36 3 °C) (08/24/18 1618)    Pulse 99 (08/24/18 1618)   Resp 18 (08/24/18 1618)    SpO2 100 % (08/24/18 1618)

## 2018-08-24 NOTE — H&P
History and Physical   Colon and Rectal Surgery   Stan Haji 46 y o  female MRN: [id number]  Unit/Bed#: [facility] Encounter: [id number]  18   11:13 AM      CC: Multiply recurrent diverticulitis, sigmoid colon  History of Present Illness   HPI:  Stan Haji is a 46 y o  female for surgical treatment  Her symptoms have persisted since she was last seen  Otherwise, she has not changed  Historical Information   Past Medical History:   Diagnosis Date    Diverticulitis     Endometriosis     Hypertension      Past Surgical History:   Procedure Laterality Date    BREAST IMPLANT Bilateral      SECTION       SECTION      COLONOSCOPY      x4    EYE SURGERY      biklat eyelid surgery    FRACTURE SURGERY      right arm     HYSTERECTOMY      LAPAROSCOPIC LYSIS INTESTINAL ADHESIONS         Meds/Allergies     Prescriptions Prior to Admission   Medication    lisinopril (ZESTRIL) 20 mg tablet         Current Facility-Administered Medications:     ceFAZolin (ANCEF) IVPB (premix) 1,000 mg, 1,000 mg, Intravenous, Once, Madi Jones MD    heparin (porcine) subcutaneous injection 5,000 Units, 5,000 Units, Subcutaneous, Once, Madi Jones MD    lactated ringers infusion, 125 mL/hr, Intravenous, Continuous, Rika Daniels MD, Last Rate: 125 mL/hr at 18 0941, 125 mL/hr at 18 09    metroNIDAZOLE (FLAGYL) IVPB (premix) 500 mg, 500 mg, Intravenous, Once, Madi Jones MD    No Known Allergies      Social History   History   Alcohol Use No     History   Drug Use No     History   Smoking Status    Never Smoker   Smokeless Tobacco    Never Used         Family History: History reviewed  No pertinent family history        Objective     Current Vitals:   Blood Pressure: 125/71 (18 08)  Pulse: 70 (18 08)  Temperature: 97 7 °F (36 5 °C) (18 08)  Temp Source: Tympanic Core (18)  Respirations: 20 (18)  Height: 5' 4" (162 6 cm) (08/24/18 0920)  Weight - Scale: 77 1 kg (170 lb) (08/24/18 0920)  SpO2: 99 % (08/24/18 0851)  No intake or output data in the 24 hours ending 08/24/18 1113    Physical Exam:  General:  Well nourished, no distress  Neuro: Alert and oriented  Eyes:Sclera anicteric, conjunctiva pink  Pulm: Clear to auscultation bilaterally  No respiratory Distress  CV:  Regular rate and rhythm  No murmurs  Abdomen:  Soft, flat, non-tender, without masses or hepatosplenomegaly  Lab Results:       ASSESSMENT:  Ofelia Davis is a 46 y o  female for laparoscopic sigmoid resection  PLAN:  Risks of surgery, including but not limited to bleeding, pain, infection, hernia formation, injury to the ureter or other internal organs requiring surgery specific to these injuries, anastomotic leak, deep vein thrombosis, pulmonary embolism, myocardial infarction or heart failure, stroke, death, need for and enterostomy, or other unspecified complications were discussed  Benefits and alternatives were discussed  Questions were answered    Staci Ramon MD

## 2018-08-25 LAB
ANION GAP SERPL CALCULATED.3IONS-SCNC: 6 MMOL/L (ref 4–13)
BASOPHILS # BLD AUTO: 0 THOUSANDS/ΜL (ref 0–0.1)
BASOPHILS NFR BLD AUTO: 0 % (ref 0–1)
BUN SERPL-MCNC: 12 MG/DL (ref 5–25)
CALCIUM SERPL-MCNC: 8.3 MG/DL (ref 8.3–10.1)
CHLORIDE SERPL-SCNC: 103 MMOL/L (ref 100–108)
CO2 SERPL-SCNC: 25 MMOL/L (ref 21–32)
CREAT SERPL-MCNC: 0.71 MG/DL (ref 0.6–1.3)
EOSINOPHIL # BLD AUTO: 0 THOUSAND/ΜL (ref 0–0.61)
EOSINOPHIL NFR BLD AUTO: 0 % (ref 0–6)
ERYTHROCYTE [DISTWIDTH] IN BLOOD BY AUTOMATED COUNT: 13.3 % (ref 11.6–15.1)
GFR SERPL CREATININE-BSD FRML MDRD: 98 ML/MIN/1.73SQ M
GLUCOSE SERPL-MCNC: 135 MG/DL (ref 65–140)
HCT VFR BLD AUTO: 35.7 % (ref 34.8–46.1)
HGB BLD-MCNC: 11.3 G/DL (ref 11.5–15.4)
IMM GRANULOCYTES # BLD AUTO: 0.02 THOUSAND/UL (ref 0–0.2)
IMM GRANULOCYTES NFR BLD AUTO: 0 % (ref 0–2)
LYMPHOCYTES # BLD AUTO: 0.65 THOUSANDS/ΜL (ref 0.6–4.47)
LYMPHOCYTES NFR BLD AUTO: 8 % (ref 14–44)
MAGNESIUM SERPL-MCNC: 1.4 MG/DL (ref 1.6–2.6)
MCH RBC QN AUTO: 29 PG (ref 26.8–34.3)
MCHC RBC AUTO-ENTMCNC: 31.7 G/DL (ref 31.4–37.4)
MCV RBC AUTO: 92 FL (ref 82–98)
MONOCYTES # BLD AUTO: 0.65 THOUSAND/ΜL (ref 0.17–1.22)
MONOCYTES NFR BLD AUTO: 8 % (ref 4–12)
NEUTROPHILS # BLD AUTO: 7.35 THOUSANDS/ΜL (ref 1.85–7.62)
NEUTS SEG NFR BLD AUTO: 84 % (ref 43–75)
NRBC BLD AUTO-RTO: 0 /100 WBCS
PLATELET # BLD AUTO: 273 THOUSANDS/UL (ref 149–390)
PMV BLD AUTO: 9.6 FL (ref 8.9–12.7)
POTASSIUM SERPL-SCNC: 4.1 MMOL/L (ref 3.5–5.3)
RBC # BLD AUTO: 3.89 MILLION/UL (ref 3.81–5.12)
SODIUM SERPL-SCNC: 134 MMOL/L (ref 136–145)
WBC # BLD AUTO: 8.67 THOUSAND/UL (ref 4.31–10.16)

## 2018-08-25 PROCEDURE — 85025 COMPLETE CBC W/AUTO DIFF WBC: CPT | Performed by: SURGERY

## 2018-08-25 PROCEDURE — 80048 BASIC METABOLIC PNL TOTAL CA: CPT | Performed by: SURGERY

## 2018-08-25 PROCEDURE — 83735 ASSAY OF MAGNESIUM: CPT | Performed by: SURGERY

## 2018-08-25 PROCEDURE — 94762 N-INVAS EAR/PLS OXIMTRY CONT: CPT

## 2018-08-25 PROCEDURE — C9113 INJ PANTOPRAZOLE SODIUM, VIA: HCPCS | Performed by: SURGERY

## 2018-08-25 RX ORDER — OXYCODONE HYDROCHLORIDE 5 MG/1
5 TABLET ORAL EVERY 4 HOURS PRN
Status: DISCONTINUED | OUTPATIENT
Start: 2018-08-25 | End: 2018-08-27 | Stop reason: HOSPADM

## 2018-08-25 RX ORDER — PANTOPRAZOLE SODIUM 40 MG/1
40 INJECTION, POWDER, FOR SOLUTION INTRAVENOUS ONCE
Status: COMPLETED | OUTPATIENT
Start: 2018-08-25 | End: 2018-08-25

## 2018-08-25 RX ORDER — ACETAMINOPHEN 325 MG/1
650 TABLET ORAL EVERY 6 HOURS PRN
Status: DISCONTINUED | OUTPATIENT
Start: 2018-08-25 | End: 2018-08-27 | Stop reason: HOSPADM

## 2018-08-25 RX ORDER — DEXTROSE, SODIUM CHLORIDE, AND POTASSIUM CHLORIDE 5; .45; .15 G/100ML; G/100ML; G/100ML
75 INJECTION INTRAVENOUS CONTINUOUS
Status: DISCONTINUED | OUTPATIENT
Start: 2018-08-25 | End: 2018-08-26

## 2018-08-25 RX ORDER — OXYCODONE HYDROCHLORIDE 10 MG/1
10 TABLET ORAL EVERY 4 HOURS PRN
Status: DISCONTINUED | OUTPATIENT
Start: 2018-08-25 | End: 2018-08-27 | Stop reason: HOSPADM

## 2018-08-25 RX ORDER — MAGNESIUM SULFATE 1 G/100ML
1 INJECTION INTRAVENOUS ONCE
Status: COMPLETED | OUTPATIENT
Start: 2018-08-25 | End: 2018-08-25

## 2018-08-25 RX ADMIN — DEXTROSE, SODIUM CHLORIDE, AND POTASSIUM CHLORIDE 75 ML/HR: 5; .45; .15 INJECTION INTRAVENOUS at 05:00

## 2018-08-25 RX ADMIN — Medication: at 00:15

## 2018-08-25 RX ADMIN — MAGNESIUM SULFATE HEPTAHYDRATE 1 G: 1 INJECTION, SOLUTION INTRAVENOUS at 15:00

## 2018-08-25 RX ADMIN — ONDANSETRON 4 MG: 2 INJECTION, SOLUTION INTRAMUSCULAR; INTRAVENOUS at 08:48

## 2018-08-25 RX ADMIN — PANTOPRAZOLE SODIUM 40 MG: 40 INJECTION, POWDER, FOR SOLUTION INTRAVENOUS at 19:14

## 2018-08-25 RX ADMIN — DEXTROSE, SODIUM CHLORIDE, AND POTASSIUM CHLORIDE 75 ML/HR: 5; .45; .15 INJECTION INTRAVENOUS at 15:00

## 2018-08-25 RX ADMIN — SODIUM CHLORIDE 100 ML/HR: 0.9 INJECTION, SOLUTION INTRAVENOUS at 03:50

## 2018-08-25 RX ADMIN — ENOXAPARIN SODIUM 40 MG: 40 INJECTION SUBCUTANEOUS at 08:45

## 2018-08-25 RX ADMIN — Medication: at 15:00

## 2018-08-25 NOTE — CASE MANAGEMENT
Initial Clinical Review    Thank you,  [facility] Review Department  Phone: [phone]; Fax [phone]  ATTENTION: Please call with any questions or concerns to [phone]  and carefully follow the prompts so that you are directed to the right person  Send all requests for admission clinical reviews, approved or denied determinations and any other requests to fax [phone]  All voicemails are confidential        Age/Sex: 46 y o  female    Surgery Date:  8/24/2018    Procedure: LAPAROSCOPIC SIGMOID RESECTION, MOBILIZATION OF splenic flexure (N/A)  SIGMOIDOSCOPY FLEXIBLE (N/A)   Lysis of adhesions requiring more than 50% of time of case    Anesthesia: General    Operative Indications:  Diverticulitis of intestine without perforation or abscess without bleeding [K57 92]     Operative Findings:    * Diverticulitis of intestine without perforation or abscess without bleeding [K57 92]  Extensive intraabdominal adhesions        Complications:   None    Admission Orders: Date/Time/Statement: 8/24/18 @ 0     Orders Placed This Encounter   Procedures    Inpatient Admission     Standing Status:   Standing     Number of Occurrences:   1     Order Specific Question:   Admitting Physician     Answer:   Ariel Gleason [[id number]]     Order Specific Question:   Level of Care     Answer:   Med Surg [16]     Order Specific Question:   Estimated length of stay     Answer:   More than 2 Midnights     Order Specific Question:   Certification     Answer:   I certify that inpatient services are medically necessary for this patient for a duration of greater than two midnights  See H&P and MD Progress Notes for additional information about the patient's course of treatment         Vital Signs: /74 (BP Location: Right arm)   Pulse 104   Temp 98 5 °F (36 9 °C) (Oral)   Resp 16   Ht 5' 4" (1 626 m)   Wt 77 1 kg (170 lb)   SpO2 91%   BMI 29 18 kg/m²     Diet: Clear liquid     Mobility: Ambulate pt q shift; PT/OT evals    DVT Prophylaxis: B/L venodynes    Nursing orders:  IS q 1h while awake, Cont Pox; d/c sánchez cath, monitor Dilaudid PCA    Scheduled Meds:  Current Facility-Administered Medications:  acetaminophen 650 mg Oral Q6H PRN Freida Rachel    dextrose 5 % and sodium chloride 0 45 % with KCl 20 mEq/L 75 mL/hr Intravenous Continuous Naeeme Umatilla Last Rate: 75 mL/hr (08/25/18 0500)   enoxaparin 40 mg Subcutaneous Daily Dalila Rachel    HYDROmorphone  Intravenous Continuous Dalila Rachel    magnesium sulfate 1 g Intravenous Once Rachelle Servin MD    ondansetron 4 mg Intravenous Q6H PRN Souleymane Umatilla    oxyCODONE 10 mg Oral Q4H PRN Naeeme Umatilla    oxyCODONE 5 mg Oral Q4H PRN Freida Stark Rachel      Continuous Infusions:  dextrose 5 % and sodium chloride 0 45 % with KCl 20 mEq/L 75 mL/hr Last Rate: 75 mL/hr (08/25/18 0500)   HYDROmorphone       PRN Meds:   acetaminophen    Ondansetron IV x1    oxyCODONE

## 2018-08-25 NOTE — PROGRESS NOTES
Progress Note - Colorectal Surgery   Ankur Chung 46 y o  female MRN: [id number]  Unit/Bed#: [facility] 824-01 Encounter: [id number]    Assessment/Plan:  46 y o  female with hx diverticulitis     * Diverticulitis large intestine w/o perforation or abscess w/o bleeding   Assessment & Plan    S/p laparoscopic sigmoid resection, mobilization of splenic flexure 8/24  Clears + toast -- advance as tolerated  PCA + PO meds for pain control  OOB, ambulate  D/c sánchez  Maintenance IVF            Subjective/Objective     Subjective: Patient reports pain controlled  No bowel function yet  Did not sleep well overnight  Objective:     Vitals: Blood pressure 122/86, pulse 93, temperature 98 3 °F (36 8 °C), temperature source Oral, resp  rate 18, height 5' 4" (1 626 m), weight 77 1 kg (170 lb), SpO2 94 %  ,Body mass index is 29 18 kg/m²  I/O       08/23 0701 - 08/24 0700 08/24 0701 - 08/25 0700 08/25 0701 - 08/26 0700    I V  (mL/kg)  3461 1 (44 9)     Total Intake(mL/kg)  3461 1 (44 9)     Urine (mL/kg/hr)  1225     Blood  60     Total Output   1285      Net   +2176 1                   Physical Exam:  GEN: NAD  HEENT: MMM  CV: RRR  Lung: Normal effort  Ab: Soft, NT/ND, incisions C/D/I  Extrem: No CCE  Neuro:  A+Ox3    Lab, Imaging and other studies:   CBC with diff:   Lab Results   Component Value Date    WBC 8 67 08/25/2018    HGB 11 3 (L) 08/25/2018    HCT 35 7 08/25/2018    MCV 92 08/25/2018     08/25/2018    MCH 29 0 08/25/2018    MCHC 31 7 08/25/2018    RDW 13 3 08/25/2018    MPV 9 6 08/25/2018    NRBC 0 08/25/2018   , BMP/CMP: No results found for: NA, K, CL, CO2, ANIONGAP, BUN, CREATININE, GLUCOSE, CALCIUM, AST, ALT, ALKPHOS, PROT, ALBUMIN, BILITOT, EGFR, Magnesium: No results found for: MAG  VTE Pharmacologic Prophylaxis: Heparin  VTE Mechanical Prophylaxis: sequential compression device

## 2018-08-25 NOTE — ASSESSMENT & PLAN NOTE
S/p laparoscopic sigmoid resection, mobilization of splenic flexure 8/24  Clears + toast -- advance as tolerated  PCA + PO meds for pain control  OOB, ambulate  D/c sánchez  Maintenance IVF

## 2018-08-26 LAB
ANION GAP SERPL CALCULATED.3IONS-SCNC: 5 MMOL/L (ref 4–13)
BASOPHILS # BLD AUTO: 0.01 THOUSANDS/ΜL (ref 0–0.1)
BASOPHILS NFR BLD AUTO: 0 % (ref 0–1)
BUN SERPL-MCNC: 7 MG/DL (ref 5–25)
CALCIUM SERPL-MCNC: 8.9 MG/DL (ref 8.3–10.1)
CHLORIDE SERPL-SCNC: 104 MMOL/L (ref 100–108)
CO2 SERPL-SCNC: 27 MMOL/L (ref 21–32)
CREAT SERPL-MCNC: 0.77 MG/DL (ref 0.6–1.3)
EOSINOPHIL # BLD AUTO: 0.05 THOUSAND/ΜL (ref 0–0.61)
EOSINOPHIL NFR BLD AUTO: 1 % (ref 0–6)
ERYTHROCYTE [DISTWIDTH] IN BLOOD BY AUTOMATED COUNT: 13.2 % (ref 11.6–15.1)
GFR SERPL CREATININE-BSD FRML MDRD: 89 ML/MIN/1.73SQ M
GLUCOSE SERPL-MCNC: 99 MG/DL (ref 65–140)
HCT VFR BLD AUTO: 36.2 % (ref 34.8–46.1)
HGB BLD-MCNC: 11.7 G/DL (ref 11.5–15.4)
IMM GRANULOCYTES # BLD AUTO: 0.01 THOUSAND/UL (ref 0–0.2)
IMM GRANULOCYTES NFR BLD AUTO: 0 % (ref 0–2)
LYMPHOCYTES # BLD AUTO: 1.33 THOUSANDS/ΜL (ref 0.6–4.47)
LYMPHOCYTES NFR BLD AUTO: 22 % (ref 14–44)
MAGNESIUM SERPL-MCNC: 2.3 MG/DL (ref 1.6–2.6)
MCH RBC QN AUTO: 29.3 PG (ref 26.8–34.3)
MCHC RBC AUTO-ENTMCNC: 32.3 G/DL (ref 31.4–37.4)
MCV RBC AUTO: 91 FL (ref 82–98)
MONOCYTES # BLD AUTO: 0.51 THOUSAND/ΜL (ref 0.17–1.22)
MONOCYTES NFR BLD AUTO: 9 % (ref 4–12)
NEUTROPHILS # BLD AUTO: 4.09 THOUSANDS/ΜL (ref 1.85–7.62)
NEUTS SEG NFR BLD AUTO: 68 % (ref 43–75)
NRBC BLD AUTO-RTO: 0 /100 WBCS
PLATELET # BLD AUTO: 257 THOUSANDS/UL (ref 149–390)
PMV BLD AUTO: 9.5 FL (ref 8.9–12.7)
POTASSIUM SERPL-SCNC: 3.8 MMOL/L (ref 3.5–5.3)
RBC # BLD AUTO: 4 MILLION/UL (ref 3.81–5.12)
SODIUM SERPL-SCNC: 136 MMOL/L (ref 136–145)
WBC # BLD AUTO: 6 THOUSAND/UL (ref 4.31–10.16)

## 2018-08-26 PROCEDURE — 83735 ASSAY OF MAGNESIUM: CPT | Performed by: SURGERY

## 2018-08-26 PROCEDURE — 80048 BASIC METABOLIC PNL TOTAL CA: CPT | Performed by: SURGERY

## 2018-08-26 PROCEDURE — 85025 COMPLETE CBC W/AUTO DIFF WBC: CPT | Performed by: SURGERY

## 2018-08-26 RX ADMIN — DEXTROSE, SODIUM CHLORIDE, AND POTASSIUM CHLORIDE 75 ML/HR: 5; .45; .15 INJECTION INTRAVENOUS at 05:13

## 2018-08-26 RX ADMIN — ENOXAPARIN SODIUM 40 MG: 40 INJECTION SUBCUTANEOUS at 08:45

## 2018-08-26 NOTE — SOCIAL WORK
CM met with pt and explained role  Pt resides with  Ld Hernandez and her two children in 2 fl home with 2 OPAL  Pt can ambulate without assistance  Pt has no DME  Pt has no hx of SNF or VNA  Pt uses [facility] in [address] [facility] and has rx coverage  PT has no hx of mental health or substance abuse  Pt  Ld Hernandez is POA ([phone])    CM reviewed d/c planning process including the following: identifying help at home, patient preference for d/c planning needs, Discharge Lounge, [facility] Meds to Bed program, availability of treatment team to discuss questions or concerns patient and/or family may have regarding understanding medications and recognizing signs and symptoms once discharged  CM also encouraged patient to follow up with all recommended appointments after discharge  Patient advised of importance for patient and family to participate in managing patients medical well being

## 2018-08-26 NOTE — PROGRESS NOTES
Left ac Iv site not flushing; pt requesting iv site to remain out; dr Lilia Crews aware; instructed to replace iv site

## 2018-08-26 NOTE — PROGRESS NOTES
Into notify patient sunil rn will be in to attempt iv site placement; pt refusing; states "i am going home tomorrow  I am drinking, peeing, and passing gas  I have no more nausea   I don't need it"; dr Roger Fuller aware; instructed to keep iv site out at this time

## 2018-08-26 NOTE — PROGRESS NOTES
Progress Note - General Surgery   Beverly Coon 46 y o  female MRN: [id number]  Unit/Bed#: [facility] 824-01 Encounter: [id number]    Assessment:  46 F with hx diverticulitis POD 2 s/p lap sigmoid resection with mobilization of splenic flexure    Plan:  - Diet - Advance Diet as tolerated  - Maintain PCA + PO meds PRN  - OOB/Ambulate  - Continue IVF    Subjective/Objective   Chief Complaint:     Subjective: No acute events  Patient not taking narcotic pain medications due to constipation  She is passing flatus  Refused labs this AM  No fevers, chills, n/v, sob/cp  Objective:     Blood pressure 128/77, pulse 99, temperature 98 5 °F (36 9 °C), temperature source Oral, resp  rate 18, height 5' 4" (1 626 m), weight 77 1 kg (170 lb), SpO2 92 %  ,Body mass index is 29 18 kg/m²  Intake/Output Summary (Last 24 hours) at 08/26/18 0551  Last data filed at 08/26/18 0518   Gross per 24 hour   Intake           2205 2 ml   Output             3550 ml   Net          -1344 8 ml       Invasive Devices     Peripheral Intravenous Line            Peripheral IV 08/24/18 Left Antecubital 1 day    Peripheral IV 08/24/18 Left Wrist 1 day                Physical Exam: General: AAOx3  Respiratory: BS b/l  Abdomen: Soft, appropriately tender, no rebound/guarding  Incision's c/d/i, pfannensteil incision c/d/i  Heart: RRR, S1s2  Ext: Warm no cyanosis     Lab, Imaging and other studies:  I have personally reviewed pertinent lab results    , CBC: No results found for: WBC, HGB, HCT, MCV, PLT, ADJUSTEDWBC, MCH, MCHC, RDW, MPV, NRBC, CMP:   Lab Results   Component Value Date     (L) 08/25/2018    K 4 1 08/25/2018     08/25/2018    CO2 25 08/25/2018    ANIONGAP 6 08/25/2018    BUN 12 08/25/2018    CREATININE 0 71 08/25/2018    GLUCOSE 135 08/25/2018    CALCIUM 8 3 08/25/2018    EGFR 98 08/25/2018     VTE Pharmacologic Prophylaxis: Heparin  VTE Mechanical Prophylaxis: sequential compression device

## 2018-08-27 VITALS
HEIGHT: 64 IN | WEIGHT: 170 LBS | OXYGEN SATURATION: 96 % | HEART RATE: 85 BPM | SYSTOLIC BLOOD PRESSURE: 131 MMHG | BODY MASS INDEX: 29.02 KG/M2 | TEMPERATURE: 98.7 F | RESPIRATION RATE: 15 BRPM | DIASTOLIC BLOOD PRESSURE: 75 MMHG

## 2018-08-27 RX ORDER — OXYCODONE HYDROCHLORIDE 5 MG/1
5 TABLET ORAL EVERY 6 HOURS PRN
Qty: 20 TABLET | Refills: 0 | Status: SHIPPED | OUTPATIENT
Start: 2018-08-27 | End: 2018-09-06

## 2018-08-27 RX ADMIN — ENOXAPARIN SODIUM 40 MG: 40 INJECTION SUBCUTANEOUS at 08:37

## 2018-08-27 NOTE — PROGRESS NOTES
Progress Note - Colorectal   Stan Haji 46 y o  female MRN: [id number]  Unit/Bed#: [facility] 824-01 Encounter: [id number]      Objective:  Patient is doing extremely well postoperatively  She is ambulating the halls well  There is no nausea no vomiting she is passing flatus she is tolerating a house diet  She is not taking anything for pain refusing even Tylenol  Patient is voiding well on her own  Anxious to go home  2350 UA    Blood pressure 117/74, pulse 95, temperature 98 3 °F (36 8 °C), temperature source Oral, resp  rate 20, height 5' 4" (1 626 m), weight 77 1 kg (170 lb), SpO2 94 %  ,Body mass index is 29 18 kg/m²  Intake/Output Summary (Last 24 hours) at 08/27/18 0511  Last data filed at 08/27/18 0300   Gross per 24 hour   Intake              700 ml   Output             3500 ml   Net            -2800 ml       Invasive Devices          No matching active lines, drains, or airways          Physical Exam:   Abdomen:  Soft does not appear distended, lower midline incision clean and dry, trocar sites clean and dry, incisional tenderness  Extremities:   There is no edema no calf tenderness    Lab, Imaging and other studies:  None pending  VTE Pharmacologic Prophylaxis: Enoxaparin (Lovenox)  VTE Mechanical Prophylaxis: sequential compression device    Assessment:  POD # 3  Laparoscopic sigmoid colon resection mobilization of the splenic flexure, and flexible sigmoidoscopy for diverticular disease    Plan:  Home today

## 2018-08-27 NOTE — SOCIAL WORK
Cm reviewed patient during care coordination rounds with [facility]  Patient stable for discharge home today  Patient's  to transport at 12PM NOON  PT recommending home with family support  Cm following

## 2018-08-27 NOTE — PHYSICAL THERAPY NOTE
Physical Therapy Screen    Patient Name: Laura Medeiros    HXIHF'R Date: 2018     Problem List  Patient Active Problem List   Diagnosis    Diverticulitis large intestine w/o perforation or abscess w/o bleeding        Past Medical History  Past Medical History:   Diagnosis Date    Diverticulitis     Endometriosis     Hypertension         Past Surgical History  Past Surgical History:   Procedure Laterality Date    BREAST IMPLANT Bilateral      SECTION       SECTION      COLONOSCOPY      x4    EYE SURGERY      biklat eyelid surgery    FRACTURE SURGERY      right arm     HEMICOLOECTOMY W/ ANASTOMOSIS N/A 2018    Procedure: LAPAROSCOPIC SIGMOID RESECTION, MOBILIZATION OF splenic flexure; Surgeon: Roxanne Willson MD;  [facility]: [facility];  Service: Colorectal    HYSTERECTOMY      LAPAROSCOPIC LYSIS INTESTINAL ADHESIONS      HI SIGMOIDOSCOPY FLX DX W/COLLJ SPEC BR/WA IF PFRMD N/A 2018    Procedure: SIGMOIDOSCOPY FLEXIBLE;  Surgeon: Roxanne Willson MD;  Location: [facility];  Service: Colorectal        Spoke with pt and introduced myself  Pt reports ambulating independently in hallway without DME and reports being discharged from [facility] today  Pt reports no concerns and no safety issues upon returning home  Pt reports supportive  at home to assist upon D/C  No skilled inpt PT services needed at this time,D/C pt from PT services

## 2018-08-27 NOTE — DISCHARGE SUMMARY
Discharge Summary - Colorectal Surgery   Omid Aguilera 46 y o  female MRN: [id number]  Unit/Bed#: [facility] 824-01 Encounter: [id number]        Admitting Diagnosis:  Recurrent diverticulitis    Admit Date:  August 24, 2018    Discharge Diagnosis:  Same    Discharge Date:  August 27, 2018    HPI:  This is a pleasant 55-year-old woman who has had recurring diverticulitis  She is now being admitted for surgical intervention  Procedures Performed:   August 24, 2018 laparoscopic sigmoid colon resection, mobilization of the splenic flexure, flexible sigmoidoscopy  Hospital Course:  Patient has done extremely well postoperatively  She was able to tolerate a clear liquid diet and be advanced to a house diet without problems  She refused to have use her PCA for pain management  She is not using pain medication at all  There is no nausea no emesis  She is ambulating the halls extremely well  She is passing flatus  She has been on Lovenox prophylactically for DVT prophylaxis  She will be discharged home today and followed with Dr Monica Zhang in 4 weeks  Pathology pending    Complications:  None      Condition at Discharge: good     Discharge instructions/Information to patient and family:   See after visit summary for information provided to patient and family  Provisions for Follow-Up Care:  See after visit summary for information related to follow-up care and any pertinent home health orders  Disposition: Home    Planned Readmission: No    Discharge Statement   I spent 30 minutes discharging the patient  This time was spent on the day of discharge  I had direct contact with the patient on the day of discharge  Additional documentation is required if more than 30 minutes were spent on discharge  Discharge Medications:  See after visit summary for reconciled discharge medications provided to patient and family

## 2018-08-27 NOTE — CASE MANAGEMENT
Notification of Inpatient Admission/Inpatient Authorization Request  This is a Notification of Inpatient Admission/Request for Inpatient Authorization for our facility [facility]  Please be advised that this patient is currently in our facility under Inpatient Status  Below you will find the Attending Physician and Facilitys information including NPI#  and contact information for the Utilization Review Department where the patient is receiving care services  Place of Service Code: 24   Place of Service Name: Inpatient Hospital  Presentation Date & Time: 8/24/2018  8:16 AM  Inpatient Admission Date & Time: 8/24/18 1625  Discharge Date & Time: 8/27/2018  1:35 PM   Discharge Disposition (if discharged): Home/Self Care  Attending Physician:   ADOLPH Ron  Specialty- Colon & Rectal Surgery  [facility] ID- [id number]  [address]  Phone 1: [phone]  Fax: [phone]  Admission Orders:   Admission Orders     Ordered        08/24/18 1625  Inpatient Admission  Once             Medina Santoro RN Registered Nurse Signed   Case Management Date of Service: 8/25/2018  2:27 PM         []Hide copied text  Initial Clinical Review     Thank you,  Abdirahman No Utilization Review Department  Phone: [phone]; Fax [phone]  ATTENTION: Please call with any questions or concerns to [phone]  and carefully follow the prompts so that you are directed to the right person  Send all requests for admission clinical reviews, approved or denied determinations and any other requests to fax [phone]   All voicemails are confidential          Age/Sex: 46 y o  female     Surgery Date:  8/24/2018     Procedure: LAPAROSCOPIC SIGMOID RESECTION, MOBILIZATION OF splenic flexure (N/A)  SIGMOIDOSCOPY FLEXIBLE (N/A)   Lysis of adhesions requiring more than 50% of time of case     Anesthesia: General     Operative Indications:  Diverticulitis of intestine without perforation or abscess without bleeding [K57 92]     Operative Findings:    * Diverticulitis of intestine without perforation or abscess without bleeding [K57 92]  Extensive intraabdominal adhesions        Complications:   None     Admission Orders: Date/Time/Statement: 8/24/18 @ 0            Orders Placed This Encounter   Procedures    Inpatient Admission       Standing Status:   Standing       Number of Occurrences:   1       Order Specific Question:   Admitting Physician       Answer:   Jamilah Betancourt [[id number]]       Order Specific Question:   Level of Care       Answer:   Med Surg [16]       Order Specific Question:   Estimated length of stay       Answer:   More than 2 Midnights       Order Specific Question:   Certification       Answer:   I certify that inpatient services are medically necessary for this patient for a duration of greater than two midnights   See H&P and MD Progress Notes for additional information about the patient's course of treatment          Vital Signs: /74 (BP Location: Right arm)   Pulse 104   Temp 98 5 °F (36 9 °C) (Oral)   Resp 16   Ht 5' 4" (1 626 m)   Wt 77 1 kg (170 lb)   SpO2 91%   BMI 29 18 kg/m²      Diet: Clear liquid      Mobility: Ambulate pt q shift; PT/OT evals     DVT Prophylaxis: B/L venodynes     Nursing orders:  IS q 1h while awake, Cont Pox; d/c sánchez cath, monitor Dilaudid PCA     Scheduled Meds:  Current Facility-Administered Medications:  acetaminophen 650 mg Oral Q6H PRN Nick Starch     dextrose 5 % and sodium chloride 0 45 % with KCl 20 mEq/L 75 mL/hr Intravenous Continuous Nick Starch Last Rate: 75 mL/hr (08/25/18 0500)   enoxaparin 40 mg Subcutaneous Daily Nick Starch     HYDROmorphone   Intravenous Continuous Nick Starch     magnesium sulfate 1 g Intravenous Once Jill Corona MD     ondansetron 4 mg Intravenous Q6H PRN Nick Starch     oxyCODONE 10 mg Oral Q4H PRN Rogerio Franco Wilson     oxyCODONE 5 mg Oral Q4H PRN Norah Torres        Continuous Infusions:  dextrose 5 % and sodium chloride 0 45 % with KCl 20 mEq/L 75 mL/hr Last Rate: 75 mL/hr (08/25/18 0500)   HYDROmorphone          PRN Meds:   acetaminophen    Ondansetron IV x1    oxyCODONE

## 2018-09-10 ENCOUNTER — HOSPITAL ENCOUNTER (INPATIENT)
Facility: HOSPITAL | Age: 53
LOS: 7 days | Discharge: HOME | DRG: 390 | End: 2018-09-17
Attending: EMERGENCY MEDICINE | Admitting: COLON & RECTAL SURGERY
Payer: COMMERCIAL

## 2018-09-10 ENCOUNTER — APPOINTMENT (EMERGENCY)
Dept: CT IMAGING | Facility: HOSPITAL | Age: 53
DRG: 390 | End: 2018-09-10
Payer: COMMERCIAL

## 2018-09-10 DIAGNOSIS — K56.609 SMALL BOWEL OBSTRUCTION (HCC): Primary | ICD-10-CM

## 2018-09-10 LAB
ALBUMIN SERPL BCP-MCNC: 4.3 G/DL (ref 3.5–5)
ALP SERPL-CCNC: 106 U/L (ref 46–116)
ALT SERPL W P-5'-P-CCNC: 30 U/L (ref 12–78)
ANION GAP SERPL CALCULATED.3IONS-SCNC: 8 MMOL/L (ref 4–13)
AST SERPL W P-5'-P-CCNC: 31 U/L (ref 5–45)
BASOPHILS # BLD AUTO: 0.03 THOUSANDS/ΜL (ref 0–0.1)
BASOPHILS NFR BLD AUTO: 0 % (ref 0–1)
BILIRUB SERPL-MCNC: 0.7 MG/DL (ref 0.2–1)
BUN SERPL-MCNC: 14 MG/DL (ref 5–25)
CALCIUM SERPL-MCNC: 9.9 MG/DL (ref 8.3–10.1)
CHLORIDE SERPL-SCNC: 97 MMOL/L (ref 100–108)
CO2 SERPL-SCNC: 29 MMOL/L (ref 21–32)
CREAT SERPL-MCNC: 0.95 MG/DL (ref 0.6–1.3)
EOSINOPHIL # BLD AUTO: 0.08 THOUSAND/ΜL (ref 0–0.61)
EOSINOPHIL NFR BLD AUTO: 1 % (ref 0–6)
ERYTHROCYTE [DISTWIDTH] IN BLOOD BY AUTOMATED COUNT: 13 % (ref 11.6–15.1)
GFR SERPL CREATININE-BSD FRML MDRD: 69 ML/MIN/1.73SQ M
GLUCOSE SERPL-MCNC: 156 MG/DL (ref 65–140)
HCT VFR BLD AUTO: 41.5 % (ref 34.8–46.1)
HGB BLD-MCNC: 13.8 G/DL (ref 11.5–15.4)
HOLD SPECIMEN: NORMAL
IMM GRANULOCYTES # BLD AUTO: 0.03 THOUSAND/UL (ref 0–0.2)
IMM GRANULOCYTES NFR BLD AUTO: 0 % (ref 0–2)
LIPASE SERPL-CCNC: 117 U/L (ref 73–393)
LYMPHOCYTES # BLD AUTO: 0.77 THOUSANDS/ΜL (ref 0.6–4.47)
LYMPHOCYTES NFR BLD AUTO: 8 % (ref 14–44)
MCH RBC QN AUTO: 29.5 PG (ref 26.8–34.3)
MCHC RBC AUTO-ENTMCNC: 33.3 G/DL (ref 31.4–37.4)
MCV RBC AUTO: 89 FL (ref 82–98)
MONOCYTES # BLD AUTO: 0.6 THOUSAND/ΜL (ref 0.17–1.22)
MONOCYTES NFR BLD AUTO: 6 % (ref 4–12)
NEUTROPHILS # BLD AUTO: 8.76 THOUSANDS/ΜL (ref 1.85–7.62)
NEUTS SEG NFR BLD AUTO: 85 % (ref 43–75)
NRBC BLD AUTO-RTO: 0 /100 WBCS
PLATELET # BLD AUTO: 344 THOUSANDS/UL (ref 149–390)
PMV BLD AUTO: 9.7 FL (ref 8.9–12.7)
POTASSIUM SERPL-SCNC: 4.5 MMOL/L (ref 3.5–5.3)
PROT SERPL-MCNC: 8.4 G/DL (ref 6.4–8.2)
RBC # BLD AUTO: 4.68 MILLION/UL (ref 3.81–5.12)
SODIUM SERPL-SCNC: 134 MMOL/L (ref 136–145)
WBC # BLD AUTO: 10.27 THOUSAND/UL (ref 4.31–10.16)

## 2018-09-10 PROCEDURE — 96376 TX/PRO/DX INJ SAME DRUG ADON: CPT

## 2018-09-10 PROCEDURE — 83690 ASSAY OF LIPASE: CPT | Performed by: EMERGENCY MEDICINE

## 2018-09-10 PROCEDURE — 74177 CT ABD & PELVIS W/CONTRAST: CPT

## 2018-09-10 PROCEDURE — 96374 THER/PROPH/DIAG INJ IV PUSH: CPT

## 2018-09-10 PROCEDURE — 36415 COLL VENOUS BLD VENIPUNCTURE: CPT

## 2018-09-10 PROCEDURE — 85025 COMPLETE CBC W/AUTO DIFF WBC: CPT | Performed by: EMERGENCY MEDICINE

## 2018-09-10 PROCEDURE — 96361 HYDRATE IV INFUSION ADD-ON: CPT

## 2018-09-10 PROCEDURE — 80053 COMPREHEN METABOLIC PANEL: CPT | Performed by: EMERGENCY MEDICINE

## 2018-09-10 PROCEDURE — 96375 TX/PRO/DX INJ NEW DRUG ADDON: CPT

## 2018-09-10 PROCEDURE — 99285 EMERGENCY DEPT VISIT HI MDM: CPT

## 2018-09-10 RX ORDER — SODIUM CHLORIDE, SODIUM LACTATE, POTASSIUM CHLORIDE, CALCIUM CHLORIDE 600; 310; 30; 20 MG/100ML; MG/100ML; MG/100ML; MG/100ML
100 INJECTION, SOLUTION INTRAVENOUS CONTINUOUS
Status: DISCONTINUED | OUTPATIENT
Start: 2018-09-10 | End: 2018-09-17 | Stop reason: HOSPADM

## 2018-09-10 RX ORDER — ONDANSETRON 2 MG/ML
4 INJECTION INTRAMUSCULAR; INTRAVENOUS ONCE
Status: COMPLETED | OUTPATIENT
Start: 2018-09-10 | End: 2018-09-10

## 2018-09-10 RX ORDER — ONDANSETRON 2 MG/ML
4 INJECTION INTRAMUSCULAR; INTRAVENOUS EVERY 6 HOURS PRN
Status: DISCONTINUED | OUTPATIENT
Start: 2018-09-10 | End: 2018-09-17 | Stop reason: HOSPADM

## 2018-09-10 RX ORDER — MIDAZOLAM HYDROCHLORIDE 1 MG/ML
1 INJECTION INTRAMUSCULAR; INTRAVENOUS ONCE
Status: COMPLETED | OUTPATIENT
Start: 2018-09-10 | End: 2018-09-10

## 2018-09-10 RX ADMIN — HYDROMORPHONE HYDROCHLORIDE 1 MG: 1 INJECTION, SOLUTION INTRAMUSCULAR; INTRAVENOUS; SUBCUTANEOUS at 21:07

## 2018-09-10 RX ADMIN — MIDAZOLAM HYDROCHLORIDE 1 MG: 1 INJECTION, SOLUTION INTRAMUSCULAR; INTRAVENOUS at 16:42

## 2018-09-10 RX ADMIN — IOHEXOL 100 ML: 350 INJECTION, SOLUTION INTRAVENOUS at 15:52

## 2018-09-10 RX ADMIN — SODIUM CHLORIDE 1000 ML: 0.9 INJECTION, SOLUTION INTRAVENOUS at 15:17

## 2018-09-10 RX ADMIN — HYDROMORPHONE HYDROCHLORIDE 1 MG: 1 INJECTION, SOLUTION INTRAMUSCULAR; INTRAVENOUS; SUBCUTANEOUS at 15:17

## 2018-09-10 RX ADMIN — SODIUM CHLORIDE, SODIUM LACTATE, POTASSIUM CHLORIDE, AND CALCIUM CHLORIDE 100 ML/HR: .6; .31; .03; .02 INJECTION, SOLUTION INTRAVENOUS at 17:53

## 2018-09-10 RX ADMIN — HYDROMORPHONE HYDROCHLORIDE 1 MG: 1 INJECTION, SOLUTION INTRAMUSCULAR; INTRAVENOUS; SUBCUTANEOUS at 18:02

## 2018-09-10 RX ADMIN — HYDROMORPHONE HYDROCHLORIDE 0.5 MG: 1 INJECTION, SOLUTION INTRAMUSCULAR; INTRAVENOUS; SUBCUTANEOUS at 16:41

## 2018-09-10 RX ADMIN — TOPICAL ANESTHETIC 1 SPRAY: 200 SPRAY DENTAL; PERIODONTAL at 16:42

## 2018-09-10 RX ADMIN — ONDANSETRON 4 MG: 2 INJECTION INTRAMUSCULAR; INTRAVENOUS at 15:17

## 2018-09-10 NOTE — ED NOTES
approx 250ml gastric contents obtained via intermittent suctioning   Patient tolerated procedure well      Rico Jade, VERA  09/10/18 1698

## 2018-09-10 NOTE — ED PROVIDER NOTES
History  Chief Complaint   Patient presents with    Vomiting     post op week one s/p bowel resection  pt c/o abd cramping x2 days and +n/v today  denies fevers    Abdominal Pain       History provided by:  Patient   used: No     80-year-old female about 2 weeks postop from sigmoid resection due to recurrent diverticulitis presented with about 4 days of crampy upper abdominal pain, now with 1 day of nausea and bilious vomiting  Pain is moderate to severe at times  Notes her last bowel movement was yesterday  Had been and smoothie this morning but nothing else  Her incisions have been well healing without drainage  No fevers  No urinary complaints  History of  section, hysterectomy in the past   No history of bowel obstruction  On exam she is very uncomfortable abdomen slightly distended and diffusely tender in the epigastrium and upper quadrants  No rebound or guarding  Incisions clean, dry, intact  Mucous membranes moist  Plan symptomatic treatment with IV fluids, antiemetics, analgesic and labs, CT abdomen to rule out bowel obstruction, abscess, perforation  Prior to Admission Medications   Prescriptions Last Dose Informant Patient Reported? Taking?   lisinopril (ZESTRIL) 20 mg tablet   Yes Yes   Sig: Take 20 mg by mouth daily      Facility-Administered Medications: None       Past Medical History:   Diagnosis Date    Diverticulitis     Endometriosis     Hypertension        Past Surgical History:   Procedure Laterality Date    BREAST IMPLANT Bilateral      SECTION       SECTION      COLONOSCOPY      x4    EYE SURGERY      biklat eyelid surgery    FRACTURE SURGERY      right arm     HEMICOLOECTOMY W/ ANASTOMOSIS N/A 2018    Procedure: LAPAROSCOPIC SIGMOID RESECTION, MOBILIZATION OF splenic flexure;   Surgeon: Anthony Marin MD;  Location: [facility];  Service: Colorectal    HYSTERECTOMY      LAPAROSCOPIC LYSIS INTESTINAL ADHESIONS      [place] SIGMOIDOSCOPY FLX DX W/COLLJ SPEC BR/WA IF PFRMD N/A 8/24/2018    Procedure: [address];  Surgeon: Anup Olguin MD;  Location: [facility];  Service: Colorectal       Family History   Problem Relation Age of Onset    Hypertension Mother     Hypertension Father     Breast cancer Maternal Grandmother     Breast cancer Maternal Aunt      I have reviewed and agree with the history as documented  Social History   Substance Use Topics    Smoking status: Never Smoker    Smokeless tobacco: Never Used    Alcohol use No        Review of Systems   Constitutional: Positive for appetite change  Negative for activity change, fatigue and fever  Respiratory: Negative for chest tightness and shortness of breath  Cardiovascular: Negative for chest pain and palpitations  Gastrointestinal: Positive for abdominal distention, abdominal pain, nausea and vomiting  Musculoskeletal: Negative for back pain and neck pain  Neurological: Negative for dizziness, weakness and headaches  All other systems reviewed and are negative  Physical Exam  Physical Exam   Constitutional: She is oriented to person, place, and time  She appears well-developed and well-nourished  Appears very uncomfortable  Cardiovascular: Normal rate, regular rhythm and intact distal pulses  Pulmonary/Chest: Effort normal and breath sounds normal    Abdominal: She exhibits distension  Diffusely tender upper abdomen without rebound or guarding  Surgical incisions well healing, clean, dry, intact  Musculoskeletal: Normal range of motion  She exhibits no edema  Neurological: She is alert and oriented to person, place, and time  Skin: Skin is warm and dry  Psychiatric: She has a normal mood and affect  Her behavior is normal    Nursing note and vitals reviewed        Vital Signs  ED Triage Vitals   Temperature Pulse Respirations Blood Pressure SpO2   09/10/18 1442 09/10/18 1442 09/10/18 1442 09/10/18 1442 09/10/18 1442 97 8 °F (36 6 °C) 85 18 130/76 99 %      Temp Source Heart Rate Source Patient Position - Orthostatic VS BP Location FiO2 (%)   09/10/18 1442 09/10/18 1442 09/10/18 1620 09/10/18 1620 --   Oral Monitor Lying Right arm       Pain Score       09/10/18 1620       3           Vitals:    09/10/18 1442 09/10/18 1620   BP: 130/76 114/58   Pulse: 85 77   Patient Position - Orthostatic VS:  Lying       Visual Acuity      ED Medications  Medications   ondansetron (ZOFRAN) injection 4 mg (4 mg Intravenous Given 9/10/18 1517)   HYDROmorphone (DILAUDID) injection 1 mg (1 mg Intravenous Given 9/10/18 1517)   sodium chloride 0 9 % bolus 1,000 mL (1,000 mL Intravenous New Bag 9/10/18 1517)   iohexol (OMNIPAQUE) 350 MG/ML injection (MULTI-DOSE) 100 mL (100 mL Intravenous Given 9/10/18 1552)   HYDROmorphone (DILAUDID) injection 0 5 mg (0 5 mg Intravenous Given 9/10/18 1641)   midazolam (VERSED) injection 1 mg (1 mg Intravenous Given 9/10/18 1642)   benzocaine (HURRICAINE) 20 % mucosal spray 2 spray (1 spray Mucosal Given 9/10/18 1642)       Diagnostic Studies  Results Reviewed     Procedure Component Value Units Date/Time    Comprehensive metabolic panel [[id number]]  (Abnormal) Collected:  09/10/18 1459    Lab Status:  Final result Specimen:  Blood from Arm, Left Updated:  09/10/18 1536     Sodium 134 (L) mmol/L      Potassium 4 5 mmol/L      Chloride 97 (L) mmol/L      CO2 29 mmol/L      ANION GAP 8 mmol/L      BUN 14 mg/dL      Creatinine 0 95 mg/dL      Glucose 156 (H) mg/dL      Calcium 9 9 mg/dL      AST 31 U/L      ALT 30 U/L      Alkaline Phosphatase 106 U/L      Total Protein 8 4 (H) g/dL      Albumin 4 3 g/dL      Total Bilirubin 0 70 mg/dL      eGFR 69 ml/min/1 73sq m     Narrative:         National Kidney Disease Education Program recommendations are as follows:  GFR calculation is accurate only with a steady state creatinine  Chronic Kidney disease less than 60 ml/min/1 73 sq  meters  Kidney failure less than 15 ml/min/1 73 sq  meters  Lipase [[id number]]  (Normal) Collected:  09/10/18 1459    Lab Status:  Final result Specimen:  Blood from Arm, Left Updated:  09/10/18 1525     Lipase 117 u/L     CBC and differential [[id number]]  (Abnormal) Collected:  09/10/18 1459    Lab Status:  Final result Specimen:  Blood from Arm, Left Updated:  09/10/18 1507     WBC 10 27 (H) Thousand/uL      RBC 4 68 Million/uL      Hemoglobin 13 8 g/dL      Hematocrit 41 5 %      MCV 89 fL      MCH 29 5 pg      MCHC 33 3 g/dL      RDW 13 0 %      MPV 9 7 fL      Platelets 230 Thousands/uL      nRBC 0 /100 WBCs      Neutrophils Relative 85 (H) %      Immat GRANS % 0 %      Lymphocytes Relative 8 (L) %      Monocytes Relative 6 %      Eosinophils Relative 1 %      Basophils Relative 0 %      Neutrophils Absolute 8 76 (H) Thousands/µL      Immature Grans Absolute 0 03 Thousand/uL      Lymphocytes Absolute 0 77 Thousands/µL      Monocytes Absolute 0 60 Thousand/µL      Eosinophils Absolute 0 08 Thousand/µL      Basophils Absolute 0 03 Thousands/µL                  CT abdomen pelvis with contrast   Final Result by Garcia Rojas MD (09/10 1622)      Diffuse gastric and small bowel dilation with a probable transition point in the right lower quadrant suspicious for partial small bowel obstruction  Small amount of fluid in the proximal colon without colonic distention  Diffuse colonic diverticulosis without acute diverticulitis  The study was marked in [facility] for immediate notification  Workstation performed: [id number]                    Procedures  Procedures       Phone Contacts  ED Phone Contact    ED Course                               MDM  Number of Diagnoses or Management Options  Diagnosis management comments: 63-year-old female about 2 weeks postop from sigmoid colon resection due to recurrent diverticulitis presented with bilious vomiting, crampy abdominal pain  Found to have a small-bowel obstruction    Discussed with surgical team   NG tube placed  Symptoms improved with antiemetics, analgesics  Admitted to surgical service for continued management  Amount and/or Complexity of Data Reviewed  Clinical lab tests: ordered and reviewed  Tests in the radiology section of CPT®: reviewed and ordered  Discuss the patient with other providers: yes    Patient Progress  Patient progress: improved    CritCare Time    Disposition  Final diagnoses:   Small bowel obstruction ([address] )     Time reflects when diagnosis was documented in both MDM as applicable and the Disposition within this note     Time User Action Codes Description Comment    9/10/2018  5:28 PM Verona Mohan Add [K56 609] Small bowel obstruction [facility])       ED Disposition     ED Disposition Condition Comment    Admit  Case was discussed with Chandu Jones and the patient's admission status was agreed to be Admission Status: inpatient status to the service of Dr Joceline Lees   Follow-up Information    None         Patient's Medications   Discharge Prescriptions    No medications on file     No discharge procedures on file      ED Provider  Electronically Signed by           Bianca Fischer MD  09/10/18 0518

## 2018-09-11 ENCOUNTER — APPOINTMENT (INPATIENT)
Dept: RADIOLOGY | Facility: HOSPITAL | Age: 53
DRG: 390 | End: 2018-09-11
Payer: COMMERCIAL

## 2018-09-11 LAB
ANION GAP SERPL CALCULATED.3IONS-SCNC: 7 MMOL/L (ref 4–13)
BUN SERPL-MCNC: 12 MG/DL (ref 5–25)
CALCIUM SERPL-MCNC: 8.8 MG/DL (ref 8.3–10.1)
CHLORIDE SERPL-SCNC: 103 MMOL/L (ref 100–108)
CO2 SERPL-SCNC: 28 MMOL/L (ref 21–32)
CREAT SERPL-MCNC: 0.8 MG/DL (ref 0.6–1.3)
ERYTHROCYTE [DISTWIDTH] IN BLOOD BY AUTOMATED COUNT: 13.3 % (ref 11.6–15.1)
GFR SERPL CREATININE-BSD FRML MDRD: 85 ML/MIN/1.73SQ M
GLUCOSE SERPL-MCNC: 98 MG/DL (ref 65–140)
HCT VFR BLD AUTO: 32.8 % (ref 34.8–46.1)
HGB BLD-MCNC: 10.8 G/DL (ref 11.5–15.4)
MCH RBC QN AUTO: 29.5 PG (ref 26.8–34.3)
MCHC RBC AUTO-ENTMCNC: 32.9 G/DL (ref 31.4–37.4)
MCV RBC AUTO: 90 FL (ref 82–98)
PLATELET # BLD AUTO: 276 THOUSANDS/UL (ref 149–390)
PMV BLD AUTO: 9.6 FL (ref 8.9–12.7)
POTASSIUM SERPL-SCNC: 4.1 MMOL/L (ref 3.5–5.3)
RBC # BLD AUTO: 3.66 MILLION/UL (ref 3.81–5.12)
SODIUM SERPL-SCNC: 138 MMOL/L (ref 136–145)
WBC # BLD AUTO: 5.04 THOUSAND/UL (ref 4.31–10.16)

## 2018-09-11 PROCEDURE — 85027 COMPLETE CBC AUTOMATED: CPT | Performed by: PHYSICIAN ASSISTANT

## 2018-09-11 PROCEDURE — 80048 BASIC METABOLIC PNL TOTAL CA: CPT | Performed by: PHYSICIAN ASSISTANT

## 2018-09-11 RX ADMIN — SODIUM CHLORIDE, SODIUM LACTATE, POTASSIUM CHLORIDE, AND CALCIUM CHLORIDE 100 ML/HR: .6; .31; .03; .02 INJECTION, SOLUTION INTRAVENOUS at 03:48

## 2018-09-11 RX ADMIN — HYDROMORPHONE HYDROCHLORIDE 0.5 MG: 1 INJECTION, SOLUTION INTRAMUSCULAR; INTRAVENOUS; SUBCUTANEOUS at 08:48

## 2018-09-11 RX ADMIN — SODIUM CHLORIDE, SODIUM LACTATE, POTASSIUM CHLORIDE, AND CALCIUM CHLORIDE 100 ML/HR: .6; .31; .03; .02 INJECTION, SOLUTION INTRAVENOUS at 13:51

## 2018-09-11 RX ADMIN — ONDANSETRON 4 MG: 2 INJECTION INTRAMUSCULAR; INTRAVENOUS at 01:46

## 2018-09-11 RX ADMIN — HYDROMORPHONE HYDROCHLORIDE 0.5 MG: 1 INJECTION, SOLUTION INTRAMUSCULAR; INTRAVENOUS; SUBCUTANEOUS at 13:57

## 2018-09-11 RX ADMIN — ENOXAPARIN SODIUM 40 MG: 40 INJECTION SUBCUTANEOUS at 08:48

## 2018-09-11 RX ADMIN — HYDROMORPHONE HYDROCHLORIDE 1 MG: 1 INJECTION, SOLUTION INTRAMUSCULAR; INTRAVENOUS; SUBCUTANEOUS at 01:36

## 2018-09-11 NOTE — CASE MANAGEMENT
Initial Clinical Review    Admission: Date/Time/Statement: 9/10/18 @ 1726     Orders Placed This Encounter   Procedures    Inpatient Admission (expected length of stay for this patient is greater than two midnights)     Standing Status:   Standing     Number of Occurrences:   1     Order Specific Question:   Admitting Physician     Answer:   Natalie Kelly     Order Specific Question:   Level of Care     Answer:   Med Surg [16]     Order Specific Question:   Estimated length of stay     Answer:   More than 2 Midnights     Order Specific Question:   Certification     Answer:   I certify that inpatient services are medically necessary for this patient for a duration of greater than two midnights  See H&P and MD Progress Notes for additional information about the patient's course of treatment  ED: Date/Time/Mode of Arrival:   ED Arrival Information     Expected Arrival Acuity Means of Arrival Escorted By Service Admission Type    - 9/10/2018 14:24 Urgent Wheelchair Family Member Surgery-General Urgent    Arrival Complaint    Post OP 1 wk Colon Resection - Nausea            Chief Complaint:   Chief Complaint   Patient presents with    Vomiting     post op week one s/p bowel resection  pt c/o abd cramping x2 days and +n/v today  denies fevers    Abdominal Pain       History of Illness:  46y o  year old female with history of laparoscopic sigmoid resection, mobilization of splenic flexure on 8/24/18 by Dr Yulisa Goodson presents to ED complaining of epigastric cramping x 5 days associated with nausea and vomiting today  Associated symptoms are bloating  Pain was 11/10 when she came to the ED  Last BM yesterday was normal and then took MOM which resulted in diarrhea    Poor appetite for 2 days        ED Vital Signs:   ED Triage Vitals   Temperature Pulse Respirations Blood Pressure SpO2   09/10/18 1442 09/10/18 1442 09/10/18 1442 09/10/18 1442 09/10/18 1442   97 8 °F (36 6 °C) 85 18 130/76 99 %      Temp Source Heart Rate Source Patient Position - Orthostatic VS BP Location FiO2 (%)   09/10/18 1442 09/10/18 1442 09/10/18 1620 09/10/18 1620 --   Oral Monitor Lying Right arm       Pain Score       09/10/18 1620       3        Wt Readings from Last 1 Encounters:   09/10/18 77 2 kg (170 lb 3 1 oz)       Vital Signs (abnormal): none  Exam - diffusely tender upper abdomen  Abnormal Labs/Diagnostic Test Results:   Na 134  Cl 97  Glucose 156  Total protein 8 4  Wbc 10 27  Ct abdomen - Diffuse gastric and small bowel dilation with a probable transition point in the right lower quadrant suspicious for partial small bowel obstruction  Small amount of fluid in the proximal colon without colonic distention   Diffuse colonic diverticulosis without acute diverticulitis  9/11/2018-  hgb 10 8, hct 32 8       ED Treatment: NGT to low intermittent suction  Medication Administration from 09/10/2018 1424 to 09/10/2018 1744       Date/Time Order Dose Route Action Comments     09/10/2018 1517 ondansetron (ZOFRAN) injection 4 mg 4 mg Intravenous Given      09/10/2018 1517 HYDROmorphone (DILAUDID) injection 1 mg 1 mg Intravenous Given      09/10/2018 1517 sodium chloride 0 9 % bolus 1,000 mL 1,000 mL Intravenous New Bag      09/10/2018 1552 iohexol (OMNIPAQUE) 350 MG/ML injection (MULTI-DOSE) 100 mL 100 mL Intravenous Given      09/10/2018 1641 HYDROmorphone (DILAUDID) injection 0 5 mg 0 5 mg Intravenous Given      09/10/2018 1642 midazolam (VERSED) injection 1 mg 1 mg Intravenous Given      09/10/2018 1642 benzocaine (HURRICAINE) 20 % mucosal spray 2 spray 1 spray Mucosal Given           Past Medical/Surgical History:    Active Ambulatory Problems     Diagnosis Date Noted    Diverticulitis large intestine w/o perforation or abscess w/o bleeding 08/24/2018     Resolved Ambulatory Problems     Diagnosis Date Noted    No Resolved Ambulatory Problems     Past Medical History:   Diagnosis Date    Diverticulitis     Endometriosis     Hypertension        Admitting Diagnosis: Nausea [R11 0]  Small bowel obstruction ([address] ) [K56 609]    Age/Sex: 46 y o  female    Assessment/Plan: This is a 46year old female from home with history of laparoscopic sigmoid resection, mobilization of splenic flexure on 8/24/18 presenting with abdominal pain  CT showed distention of the small bowel throughout the whole length more likely ileus vs  partial small bowel obstruction  NGT inserted and connected to suction  Patient admitted, NGT to suction, IVF, pain control  Admission Orders:  9/10/2018  1727 INPATIENT   Scheduled Meds:   Current Facility-Administered Medications:  enoxaparin 40 mg Subcutaneous Daily    HYDROmorphone 0 5 mg Intravenous Q3H PRN    HYDROmorphone 1 mg Intravenous Q3H PRN    lactated ringers 100 mL/hr Intravenous Continuous Last Rate: 100 mL/hr (09/11/18 0348)   ondansetron 4 mg Intravenous Q6H PRN      Continuous Infusions:   lactated ringers 100 mL/hr Last Rate: 100 mL/hr (09/11/18 0348)     PRN Meds: HYDROmorphone 0 5 iv - used x 1 (0848)    HYDROmorphone  1 mg iv - used x 3 (4798; 2107; 0136)    Ondansetron  4 mg iv - used x 1  OTHER ORDERS: NGT to medium intermittent suction  scds  NPO      Thank you,  [address]  Utilization Review Department  Phone: [phone]; Fax [phone]  ATTENTION: Please call with any questions or concerns to [phone]  and carefully follow the prompts so that you are directed to the right person  Send all requests for admission clinical reviews, approved or denied determinations and any other requests to fax [phone]   All voicemails are confidential

## 2018-09-11 NOTE — PROGRESS NOTES
Progress Note -Surgery NATALIE Miller Span 46 y o  female MRN: [id number]  Unit/Bed#: -01 Encounter: [id number]      Assessment:  46year old female with SBO  Plan:  -obstruction series  -pain control  -continue NGT  -ambulate  -small amount of ice chips okay  Subjective/Objective     Subjective: Denies flatus, some pain  NGT with 825 out yesterday   Objective:     /63   Pulse 83   Temp 98 3 °F (36 8 °C)   Resp 16   Wt 77 2 kg (170 lb 3 1 oz)   SpO2 92%   BMI 29 21 kg/m²   I/O last 24 hours:   In: 1611 7 [I V :611 7; IV Piggyback:1000]  Out: 1410 [Urine:350; Emesis/NG output:825]        Intake/Output Summary (Last 24 hours) at 09/11/18 0810  Last data filed at 09/11/18 0700   Gross per 24 hour   Intake          1611 67 ml   Output             1175 ml   Net           436 67 ml       Invasive Devices     Peripheral Intravenous Line            Peripheral IV 09/10/18 Left Antecubital less than 1 day          Drain            NG/OG/Enteral Tube Nasogastric 16 Fr Left nares less than 1 day                Physical Exam:  /63   Pulse 83   Temp 98 3 °F (36 8 °C)   Resp 16   Wt 77 2 kg (170 lb 3 1 oz)   SpO2 92%   BMI 29 21 kg/m²   General appearance: alert and oriented, in no acute distress and alert  Lungs: clear to auscultation bilaterally  Heart: regular rate and rhythm, S1, S2 normal, no murmur, click, rub or gallop  Abdomen: soft, non-tender; bowel sounds normal; no masses,  no organomegaly      Current Facility-Administered Medications:     enoxaparin (LOVENOX) subcutaneous injection 40 mg, 40 mg, Subcutaneous, Daily, Rakesh Montejo, PA-C    HYDROmorphone (DILAUDID) injection 0 5 mg, 0 5 mg, Intravenous, Q3H PRN, Rakesh Montejo, PA-C    HYDROmorphone (DILAUDID) injection 1 mg, 1 mg, Intravenous, Q3H PRN, Rakesh Montejo, PA-C, 1 mg at 09/11/18 0136    lactated ringers infusion, 100 mL/hr, Intravenous, Continuous, Rakesh Montejo, PA-C, Last Rate: 100 mL/hr at 09/11/18 0348, 100 mL/hr at 09/11/18 0348    ondansetron (ZOFRAN) injection 4 mg, 4 mg, Intravenous, Q6H PRN, Yolis Jarquin PA-C, 4 mg at 09/11/18 0146           Lab, Imaging and other studies:  CBC:   Lab Results   Component Value Date    WBC 5 04 09/11/2018    HGB 10 8 (L) 09/11/2018    HCT 32 8 (L) 09/11/2018    MCV 90 09/11/2018     09/11/2018    MCH 29 5 09/11/2018    MCHC 32 9 09/11/2018    RDW 13 3 09/11/2018    MPV 9 6 09/11/2018    NRBC 0 09/10/2018   , CMP:   Lab Results   Component Value Date     09/11/2018    K 4 1 09/11/2018     09/11/2018    CO2 28 09/11/2018    BUN 12 09/11/2018    CREATININE 0 80 09/11/2018    CALCIUM 8 8 09/11/2018    AST 31 09/10/2018    ALT 30 09/10/2018    ALKPHOS 106 09/10/2018    EGFR 85 09/11/2018         VTE Pharmacologic Prophylaxis: Sequential compression device (Venodyne)  and Enoxaparin (Lovenox)  VTE Mechanical Prophylaxis: sequential compression device    Rounds performed with nursing

## 2018-09-12 LAB
ANION GAP SERPL CALCULATED.3IONS-SCNC: 12 MMOL/L (ref 4–13)
BASOPHILS # BLD AUTO: 0.02 THOUSANDS/ΜL (ref 0–0.1)
BASOPHILS NFR BLD AUTO: 1 % (ref 0–1)
BUN SERPL-MCNC: 9 MG/DL (ref 5–25)
CALCIUM SERPL-MCNC: 8.8 MG/DL (ref 8.3–10.1)
CHLORIDE SERPL-SCNC: 103 MMOL/L (ref 100–108)
CO2 SERPL-SCNC: 27 MMOL/L (ref 21–32)
CREAT SERPL-MCNC: 0.84 MG/DL (ref 0.6–1.3)
EOSINOPHIL # BLD AUTO: 0.13 THOUSAND/ΜL (ref 0–0.61)
EOSINOPHIL NFR BLD AUTO: 3 % (ref 0–6)
ERYTHROCYTE [DISTWIDTH] IN BLOOD BY AUTOMATED COUNT: 12.5 % (ref 11.6–15.1)
GFR SERPL CREATININE-BSD FRML MDRD: 80 ML/MIN/1.73SQ M
GLUCOSE SERPL-MCNC: 72 MG/DL (ref 65–140)
HCT VFR BLD AUTO: 33.5 % (ref 34.8–46.1)
HGB BLD-MCNC: 11.1 G/DL (ref 11.5–15.4)
IMM GRANULOCYTES # BLD AUTO: 0.01 THOUSAND/UL (ref 0–0.2)
IMM GRANULOCYTES NFR BLD AUTO: 0 % (ref 0–2)
LYMPHOCYTES # BLD AUTO: 1.19 THOUSANDS/ΜL (ref 0.6–4.47)
LYMPHOCYTES NFR BLD AUTO: 29 % (ref 14–44)
MCH RBC QN AUTO: 29.8 PG (ref 26.8–34.3)
MCHC RBC AUTO-ENTMCNC: 33.1 G/DL (ref 31.4–37.4)
MCV RBC AUTO: 90 FL (ref 82–98)
MONOCYTES # BLD AUTO: 0.39 THOUSAND/ΜL (ref 0.17–1.22)
MONOCYTES NFR BLD AUTO: 9 % (ref 4–12)
NEUTROPHILS # BLD AUTO: 2.43 THOUSANDS/ΜL (ref 1.85–7.62)
NEUTS SEG NFR BLD AUTO: 58 % (ref 43–75)
NRBC BLD AUTO-RTO: 0 /100 WBCS
PLATELET # BLD AUTO: 263 THOUSANDS/UL (ref 149–390)
PMV BLD AUTO: 9.7 FL (ref 8.9–12.7)
POTASSIUM SERPL-SCNC: 3.7 MMOL/L (ref 3.5–5.3)
RBC # BLD AUTO: 3.73 MILLION/UL (ref 3.81–5.12)
SODIUM SERPL-SCNC: 142 MMOL/L (ref 136–145)
WBC # BLD AUTO: 4.17 THOUSAND/UL (ref 4.31–10.16)

## 2018-09-12 PROCEDURE — 80048 BASIC METABOLIC PNL TOTAL CA: CPT | Performed by: PHYSICIAN ASSISTANT

## 2018-09-12 PROCEDURE — 85025 COMPLETE CBC W/AUTO DIFF WBC: CPT | Performed by: PHYSICIAN ASSISTANT

## 2018-09-12 RX ADMIN — Medication 1 SPRAY: at 11:11

## 2018-09-12 RX ADMIN — SODIUM CHLORIDE, SODIUM LACTATE, POTASSIUM CHLORIDE, AND CALCIUM CHLORIDE 100 ML/HR: .6; .31; .03; .02 INJECTION, SOLUTION INTRAVENOUS at 00:22

## 2018-09-12 RX ADMIN — HYDROMORPHONE HYDROCHLORIDE 0.5 MG: 1 INJECTION, SOLUTION INTRAMUSCULAR; INTRAVENOUS; SUBCUTANEOUS at 00:23

## 2018-09-12 RX ADMIN — ENOXAPARIN SODIUM 40 MG: 40 INJECTION SUBCUTANEOUS at 09:07

## 2018-09-12 RX ADMIN — SODIUM CHLORIDE, SODIUM LACTATE, POTASSIUM CHLORIDE, AND CALCIUM CHLORIDE 100 ML/HR: .6; .31; .03; .02 INJECTION, SOLUTION INTRAVENOUS at 19:50

## 2018-09-12 RX ADMIN — Medication 1 SPRAY: at 13:58

## 2018-09-12 RX ADMIN — Medication 1 SPRAY: at 21:11

## 2018-09-12 RX ADMIN — SODIUM CHLORIDE, SODIUM LACTATE, POTASSIUM CHLORIDE, AND CALCIUM CHLORIDE 100 ML/HR: .6; .31; .03; .02 INJECTION, SOLUTION INTRAVENOUS at 09:07

## 2018-09-12 NOTE — PROGRESS NOTES
Progress Note -Surgery NATALIE Hu 46 y o  female MRN: [id number]  Unit/Bed#: -01 Encounter: [id number]      Assessment:  46year old female with sbo  Plan:  Continue NGT  Continue IVF  dvt ppx  Check labs  Ambulate  Imagining to be determined by Dr Winn [place] dependant on patients progress  Subjective/Objective     Subjective: Patient frustrated with being in the hospital  Denies bowel function  Pain much improved from admission  NGT with 1150 output yesterday     Objective:     /77   Pulse 90   Temp 98 7 °F (37 1 °C)   Resp 16   Wt 77 2 kg (170 lb 3 1 oz)   SpO2 94%   BMI 29 21 kg/m²   I/O last 24 hours: In: 2580 [P O :120;  I V :2400; NG/GT:60]  Out: 1800 [Urine:650; Emesis/NG output:1150]        Intake/Output Summary (Last 24 hours) at 09/12/18 0730  Last data filed at 09/12/18 0630   Gross per 24 hour   Intake             2580 ml   Output             1800 ml   Net              780 ml       Invasive Devices     Peripheral Intravenous Line            Peripheral IV 09/10/18 Left Antecubital 1 day          Drain            NG/OG/Enteral Tube Nasogastric 16 Fr Left nares 1 day                Physical Exam:  /77   Pulse 90   Temp 98 7 °F (37 1 °C)   Resp 16   Wt 77 2 kg (170 lb 3 1 oz)   SpO2 94%   BMI 29 21 kg/m²   General appearance: alert and oriented, in no acute distress and alert  Lungs: clear to auscultation bilaterally  Heart: regular rate and rhythm, S1, S2 normal, no murmur, click, rub or gallop  Abdomen: soft, non-tender; bowel sounds normal; no masses,  no organomegaly      Current Facility-Administered Medications:     enoxaparin (LOVENOX) subcutaneous injection 40 mg, 40 mg, Subcutaneous, Daily, Maria E Millwood, PA-C, 40 mg at 09/11/18 0848    HYDROmorphone (DILAUDID) injection 0 5 mg, 0 5 mg, Intravenous, Q3H PRN, Maria E Millwood, PA-C, 0 5 mg at 09/12/18 0023    HYDROmorphone (DILAUDID) injection 1 mg, 1 mg, Intravenous, Q3H PRN, Maria E Millwood, PA-C, 1 mg at 09/11/18 0136    lactated ringers infusion, 100 mL/hr, Intravenous, Continuous, Beto Gomes PA-C, Last Rate: 100 mL/hr at 09/12/18 0022, 100 mL/hr at 09/12/18 0022    ondansetron [facility] injection 4 mg, 4 mg, Intravenous, Q6H PRN, NATALIE Cole-C, 4 mg at 09/11/18 0146           Lab, Imaging and other studies:  CBC:   Lab Results   Component Value Date    WBC 4 17 (L) 09/12/2018    HGB 11 1 (L) 09/12/2018    HCT 33 5 (L) 09/12/2018    MCV 90 09/12/2018     09/12/2018    MCH 29 8 09/12/2018    MCHC 33 1 09/12/2018    RDW 12 5 09/12/2018    MPV 9 7 09/12/2018    NRBC 0 09/12/2018   , CMP:   Lab Results   Component Value Date     09/12/2018    K 3 7 09/12/2018     09/12/2018    CO2 27 09/12/2018    BUN 9 09/12/2018    CREATININE 0 84 09/12/2018    CALCIUM 8 8 09/12/2018    EGFR 80 09/12/2018         VTE Pharmacologic Prophylaxis: Sequential compression device (Venodyne)  and Enoxaparin (Lovenox)  VTE Mechanical Prophylaxis: sequential compression device    Rounds performed with nursing

## 2018-09-13 LAB
ANION GAP SERPL CALCULATED.3IONS-SCNC: 12 MMOL/L (ref 4–13)
BASOPHILS # BLD AUTO: 0.03 THOUSANDS/ΜL (ref 0–0.1)
BASOPHILS NFR BLD AUTO: 1 % (ref 0–1)
BUN SERPL-MCNC: 13 MG/DL (ref 5–25)
CALCIUM SERPL-MCNC: 9.1 MG/DL (ref 8.3–10.1)
CHLORIDE SERPL-SCNC: 99 MMOL/L (ref 100–108)
CO2 SERPL-SCNC: 26 MMOL/L (ref 21–32)
CREAT SERPL-MCNC: 0.79 MG/DL (ref 0.6–1.3)
EOSINOPHIL # BLD AUTO: 0.07 THOUSAND/ΜL (ref 0–0.61)
EOSINOPHIL NFR BLD AUTO: 1 % (ref 0–6)
ERYTHROCYTE [DISTWIDTH] IN BLOOD BY AUTOMATED COUNT: 12.4 % (ref 11.6–15.1)
GFR SERPL CREATININE-BSD FRML MDRD: 86 ML/MIN/1.73SQ M
GLUCOSE SERPL-MCNC: 77 MG/DL (ref 65–140)
HCT VFR BLD AUTO: 33.6 % (ref 34.8–46.1)
HGB BLD-MCNC: 11.3 G/DL (ref 11.5–15.4)
IMM GRANULOCYTES # BLD AUTO: 0.02 THOUSAND/UL (ref 0–0.2)
IMM GRANULOCYTES NFR BLD AUTO: 0 % (ref 0–2)
LYMPHOCYTES # BLD AUTO: 1 THOUSANDS/ΜL (ref 0.6–4.47)
LYMPHOCYTES NFR BLD AUTO: 16 % (ref 14–44)
MCH RBC QN AUTO: 29.7 PG (ref 26.8–34.3)
MCHC RBC AUTO-ENTMCNC: 33.6 G/DL (ref 31.4–37.4)
MCV RBC AUTO: 88 FL (ref 82–98)
MONOCYTES # BLD AUTO: 0.57 THOUSAND/ΜL (ref 0.17–1.22)
MONOCYTES NFR BLD AUTO: 9 % (ref 4–12)
NEUTROPHILS # BLD AUTO: 4.63 THOUSANDS/ΜL (ref 1.85–7.62)
NEUTS SEG NFR BLD AUTO: 73 % (ref 43–75)
NRBC BLD AUTO-RTO: 0 /100 WBCS
PLATELET # BLD AUTO: 305 THOUSANDS/UL (ref 149–390)
PMV BLD AUTO: 9.6 FL (ref 8.9–12.7)
POTASSIUM SERPL-SCNC: 3.5 MMOL/L (ref 3.5–5.3)
RBC # BLD AUTO: 3.8 MILLION/UL (ref 3.81–5.12)
SODIUM SERPL-SCNC: 137 MMOL/L (ref 136–145)
WBC # BLD AUTO: 6.32 THOUSAND/UL (ref 4.31–10.16)

## 2018-09-13 PROCEDURE — 80048 BASIC METABOLIC PNL TOTAL CA: CPT | Performed by: PHYSICIAN ASSISTANT

## 2018-09-13 PROCEDURE — 85025 COMPLETE CBC W/AUTO DIFF WBC: CPT | Performed by: PHYSICIAN ASSISTANT

## 2018-09-13 PROCEDURE — C9113 INJ PANTOPRAZOLE SODIUM, VIA: HCPCS | Performed by: PHYSICIAN ASSISTANT

## 2018-09-13 RX ORDER — LANOLIN ALCOHOL/MO/W.PET/CERES
6 CREAM (GRAM) TOPICAL
Status: DISCONTINUED | OUTPATIENT
Start: 2018-09-13 | End: 2018-09-17 | Stop reason: HOSPADM

## 2018-09-13 RX ORDER — PANTOPRAZOLE SODIUM 40 MG/1
40 INJECTION, POWDER, FOR SOLUTION INTRAVENOUS
Status: DISCONTINUED | OUTPATIENT
Start: 2018-09-13 | End: 2018-09-17 | Stop reason: HOSPADM

## 2018-09-13 RX ADMIN — SODIUM CHLORIDE, SODIUM LACTATE, POTASSIUM CHLORIDE, AND CALCIUM CHLORIDE 100 ML/HR: .6; .31; .03; .02 INJECTION, SOLUTION INTRAVENOUS at 12:02

## 2018-09-13 RX ADMIN — MELATONIN TAB 3 MG 6 MG: 3 TAB at 02:31

## 2018-09-13 RX ADMIN — ENOXAPARIN SODIUM 40 MG: 40 INJECTION SUBCUTANEOUS at 08:24

## 2018-09-13 RX ADMIN — PANTOPRAZOLE SODIUM 40 MG: 40 INJECTION, POWDER, FOR SOLUTION INTRAVENOUS at 11:56

## 2018-09-13 NOTE — PROGRESS NOTES
Progress Note -Surgery PA  Ankur Chung 46 y o  female MRN: [id number]  Unit/Bed#: -01 Encounter: [id number]      Subjective/Objective     Subjective: Passing gas  Has not had a BM  NGT 3L output yesterday  Cramping abdominal pain       Objective:     /73 (BP Location: Right arm)   Pulse 87   Temp 98 9 °F (37 2 °C) (Oral)   Resp 18   Wt 77 2 kg (170 lb 3 1 oz)   SpO2 94%   BMI 29 21 kg/m²       Intake/Output Summary (Last 24 hours) at 09/13/18 0815  Last data filed at 09/13/18 0700   Gross per 24 hour   Intake              960 ml   Output             3350 ml   Net            -2390 ml       Invasive Devices     Peripheral Intravenous Line            Peripheral IV 09/10/18 Left Antecubital 2 days          Drain            NG/OG/Enteral Tube Nasogastric 16 Fr Left nares 2 days                Physical Exam:  General appearance: alert and oriented, in no acute distress  Lungs: clear to auscultation bilaterally  Heart: regular rate and rhythm, S1, S2 normal, no murmur, click, rub or gallop  Abdomen: soft, bowel sounds +, scant tenderness to palpation LLQ      Current Facility-Administered Medications:     enoxaparin (LOVENOX) subcutaneous injection 40 mg, 40 mg, Subcutaneous, Daily, Sonya Almeida PA-C, 40 mg at 09/12/18 0907    HYDROmorphone (DILAUDID) injection 0 5 mg, 0 5 mg, Intravenous, Q3H PRN, Sonya Almeida PA-C, 0 5 mg at 09/12/18 0023    HYDROmorphone (DILAUDID) injection 1 mg, 1 mg, Intravenous, Q3H PRN, Sonya Almeida PA-C, 1 mg at 09/11/18 0136    lactated ringers infusion, 100 mL/hr, Intravenous, Continuous, Sonya Almeida PA-C, Last Rate: 100 mL/hr at 09/12/18 1950, 100 mL/hr at 09/12/18 1950    melatonin tablet 6 mg, 6 mg, Oral, HS PRN, Escobar Arnold MD, 6 mg at 09/13/18 0231    ondansetron (ZOFRAN) injection 4 mg, 4 mg, Intravenous, Q6H PRN, Sonya Almeida PA-C, 4 mg at 09/11/18 0146    phenol (CHLORASEPTIC) 1 4 % mucosal liquid 1 spray, 1 spray, Mouth/Throat, Q2H PRN, Harish Costa Madison Jewell PA-C, 1 spray at 09/12/18 2111              Lab, Imaging and other studies:  I have personally reviewed pertinent lab results  , CBC:   Lab Results   Component Value Date    WBC 6 32 09/13/2018    HGB 11 3 (L) 09/13/2018    HCT 33 6 (L) 09/13/2018    MCV 88 09/13/2018     09/13/2018    MCH 29 7 09/13/2018    MCHC 33 6 09/13/2018    RDW 12 4 09/13/2018    MPV 9 6 09/13/2018    NRBC 0 09/13/2018   , CMP:   Lab Results   Component Value Date     09/13/2018    K 3 5 09/13/2018    CL 99 (L) 09/13/2018    CO2 26 09/13/2018    BUN 13 09/13/2018    CREATININE 0 79 09/13/2018    CALCIUM 9 1 09/13/2018    EGFR 86 09/13/2018     Labs in chart were reviewed  Lab Results   Component Value Date    WBC 6 32 09/13/2018    HGB 11 3 (L) 09/13/2018    HCT 33 6 (L) 09/13/2018     09/13/2018     Lab Results   Component Value Date     09/13/2018    K 3 5 09/13/2018    CL 99 (L) 09/13/2018    CO2 26 09/13/2018    BUN 13 09/13/2018    CREATININE 0 79 09/13/2018       VTE Pharmacologic Prophylaxis: Enoxaparin (Lovenox)  VTE Mechanical Prophylaxis: sequential compression device    Assessment:    46year old female presenting with SBO, s/p lap sigmoid resection    Plan:    - encourage ambulation  - Patient passing gas  Monitor for BM  - Keep NGT for now due to high output  - pain and nausea control      Patient Active Problem List   Diagnosis    Diverticulitis large intestine w/o perforation or abscess w/o bleeding    Small bowel obstruction ([address] )          This text is generated with voice recognition software  There may be translation, syntax,  or grammatical errors  If you have any questions, please contact the dictating provider      Carmelita Weinberg PA-C

## 2018-09-14 LAB
ANION GAP SERPL CALCULATED.3IONS-SCNC: 16 MMOL/L (ref 4–13)
BASOPHILS # BLD AUTO: 0.02 THOUSANDS/ΜL (ref 0–0.1)
BASOPHILS NFR BLD AUTO: 0 % (ref 0–1)
BUN SERPL-MCNC: 11 MG/DL (ref 5–25)
CALCIUM SERPL-MCNC: 8.9 MG/DL (ref 8.3–10.1)
CHLORIDE SERPL-SCNC: 100 MMOL/L (ref 100–108)
CO2 SERPL-SCNC: 21 MMOL/L (ref 21–32)
CREAT SERPL-MCNC: 0.77 MG/DL (ref 0.6–1.3)
EOSINOPHIL # BLD AUTO: 0.1 THOUSAND/ΜL (ref 0–0.61)
EOSINOPHIL NFR BLD AUTO: 2 % (ref 0–6)
ERYTHROCYTE [DISTWIDTH] IN BLOOD BY AUTOMATED COUNT: 12.4 % (ref 11.6–15.1)
GFR SERPL CREATININE-BSD FRML MDRD: 89 ML/MIN/1.73SQ M
GLUCOSE SERPL-MCNC: 88 MG/DL (ref 65–140)
HCT VFR BLD AUTO: 34.5 % (ref 34.8–46.1)
HGB BLD-MCNC: 11.5 G/DL (ref 11.5–15.4)
IMM GRANULOCYTES # BLD AUTO: 0.01 THOUSAND/UL (ref 0–0.2)
IMM GRANULOCYTES NFR BLD AUTO: 0 % (ref 0–2)
LYMPHOCYTES # BLD AUTO: 0.99 THOUSANDS/ΜL (ref 0.6–4.47)
LYMPHOCYTES NFR BLD AUTO: 16 % (ref 14–44)
MCH RBC QN AUTO: 29.1 PG (ref 26.8–34.3)
MCHC RBC AUTO-ENTMCNC: 33.3 G/DL (ref 31.4–37.4)
MCV RBC AUTO: 87 FL (ref 82–98)
MONOCYTES # BLD AUTO: 0.51 THOUSAND/ΜL (ref 0.17–1.22)
MONOCYTES NFR BLD AUTO: 8 % (ref 4–12)
NEUTROPHILS # BLD AUTO: 4.41 THOUSANDS/ΜL (ref 1.85–7.62)
NEUTS SEG NFR BLD AUTO: 74 % (ref 43–75)
NRBC BLD AUTO-RTO: 0 /100 WBCS
PLATELET # BLD AUTO: 285 THOUSANDS/UL (ref 149–390)
PMV BLD AUTO: 9.1 FL (ref 8.9–12.7)
POTASSIUM SERPL-SCNC: 3.3 MMOL/L (ref 3.5–5.3)
RBC # BLD AUTO: 3.95 MILLION/UL (ref 3.81–5.12)
SODIUM SERPL-SCNC: 137 MMOL/L (ref 136–145)
WBC # BLD AUTO: 6.04 THOUSAND/UL (ref 4.31–10.16)

## 2018-09-14 PROCEDURE — 80048 BASIC METABOLIC PNL TOTAL CA: CPT | Performed by: PHYSICIAN ASSISTANT

## 2018-09-14 PROCEDURE — 85025 COMPLETE CBC W/AUTO DIFF WBC: CPT | Performed by: PHYSICIAN ASSISTANT

## 2018-09-14 PROCEDURE — C9113 INJ PANTOPRAZOLE SODIUM, VIA: HCPCS | Performed by: PHYSICIAN ASSISTANT

## 2018-09-14 RX ORDER — POTASSIUM CHLORIDE 29.8 MG/ML
40 INJECTION INTRAVENOUS ONCE
Status: DISCONTINUED | OUTPATIENT
Start: 2018-09-14 | End: 2018-09-14

## 2018-09-14 RX ADMIN — SODIUM CHLORIDE, SODIUM LACTATE, POTASSIUM CHLORIDE, AND CALCIUM CHLORIDE 100 ML/HR: .6; .31; .03; .02 INJECTION, SOLUTION INTRAVENOUS at 17:40

## 2018-09-14 RX ADMIN — POTASSIUM CHLORIDE 20 MEQ: 149 INJECTION, SOLUTION, CONCENTRATE INTRAVENOUS at 11:57

## 2018-09-14 RX ADMIN — POTASSIUM CHLORIDE 20 MEQ: 149 INJECTION, SOLUTION, CONCENTRATE INTRAVENOUS at 09:24

## 2018-09-14 RX ADMIN — PANTOPRAZOLE SODIUM 40 MG: 40 INJECTION, POWDER, FOR SOLUTION INTRAVENOUS at 10:12

## 2018-09-14 RX ADMIN — SODIUM CHLORIDE, SODIUM LACTATE, POTASSIUM CHLORIDE, AND CALCIUM CHLORIDE 100 ML/HR: .6; .31; .03; .02 INJECTION, SOLUTION INTRAVENOUS at 06:20

## 2018-09-14 RX ADMIN — ENOXAPARIN SODIUM 40 MG: 40 INJECTION SUBCUTANEOUS at 10:11

## 2018-09-14 NOTE — PLAN OF CARE
Problem: Nutrition/Hydration-ADULT  Goal: Nutrient/Hydration intake appropriate for improving, restoring or maintaining nutritional needs  Monitor and assess patient's nutrition/hydration status for malnutrition (ex- brittle hair, bruises, dry skin, pale skin and conjunctiva, muscle wasting, smooth red tongue, and disorientation)  Collaborate with interdisciplinary team and initiate plan and interventions as ordered  Monitor patient's weight and dietary intake as ordered or per policy  Utilize nutrition screening tool and intervene per policy  Determine patient's food preferences and provide high-protein, high-caloric foods as appropriate  INTERVENTIONS:  - Monitor oral intake, urinary output, labs, and treatment plans  - Assess nutrition and hydration status and recommend course of action  - Evaluate amount of meals eaten  - Assist patient with eating if necessary   - Allow adequate time for meals  - Recommend/ encourage appropriate diets, oral nutritional supplements, and vitamin/mineral supplements  - Order, calculate, and assess calorie counts as needed  - Recommend, monitor, and adjust tube feedings and TPN/PPN based on assessed needs  - Assess need for intravenous fluids  - Provide specific nutrition/hydration education as appropriate  - Include patient/family/caregiver in decisions related to nutrition   Outcome: Progressing  Pt NPO x 4 days  If unable to safely advance diet in 48-72 hours, may need to consider alternate means of nutrition  Consult prn  Will continue to monitor

## 2018-09-14 NOTE — PLAN OF CARE
Problem: DISCHARGE PLANNING - CARE MANAGEMENT  Goal: Discharge to post-acute care or home with appropriate resources  INTERVENTIONS:  - Conduct assessment to determine patient/family and health care team treatment goals, and need for post-acute services based on payer coverage, community resources, and patient preferences, and barriers to discharge  - Address psychosocial, clinical, and financial barriers to discharge as identified in assessment in conjunction with the patient/family and health care team  - Arrange appropriate level of post-acute services according to patients   needs and preference and payer coverage in collaboration with the physician and health care team  - Communicate with and update the patient/family, physician, and health care team regarding progress on the discharge plan  - Arrange appropriate transportation to post-acute venues  Outcome: Progressing  LOS 4 DAYS  PATIENT IS NOT A BUNDLE  PATIENT IS A READMISSION RELATED TO COMPLICATIONS WITH SBO  CM met with patient at bedside, patient alert and oriented and seated on couch reading a book  CM name and role reviewed  Discharge Checklist reviewed and CM will continue to monitor for progress toward discharge goals in nursing and provider rounds  Patient resides in a 2 story home with her spouse and twin daughters  There are 2 OPAL and patient is able to navigate her home without concern  Patient denies the use of DME and is independent with all ADL  CM has seen patient walking independently throughout the hallways at [facility]  Patient denies history of STR/VNA  Patient utilizes [facility] in [facility] [place], has Rx plan and is able to afford all copays  Patient denies history of MH/substance use  Patient identified her spouse as her POA and has AD as well  Patient is employed and she does drive  Patient's spouse will provide transportation at discharge   Patient is upset that she will be missing her children's birthday celebration this weekend and wishes she were able to discharge home at this time  CM validated her frustration  Patient understands that she is not yet medically stable to leave  Patient does not appear to have any needs at this time  CM Department will continue to assess for needs and will follow through discharge  CM reviewed discharge planning process including the following: identifying caregivers at home, preference for d/c planning needs, availability of treatment team to discuss questions or concerns patient and/or family may have regarding diagnosis, plan of care, old or new medications and discharge planning   CM will continue to follow for care coordination and update assessment as necessary

## 2018-09-14 NOTE — SOCIAL WORK
LOS 4 DAYS  PATIENT IS NOT A BUNDLE  PATIENT IS A READMISSION RELATED TO COMPLICATIONS WITH SBO  CM met with patient at bedside, patient alert and oriented and seated on couch reading a book  CM name and role reviewed  Discharge Checklist reviewed and CM will continue to monitor for progress toward discharge goals in nursing and provider rounds  Patient resides in a 2 story home with her spouse and twin daughters  There are 2 OPAL and patient is able to navigate her home without concern  Patient denies the use of DME and is independent with all ADL  CM has seen patient walking independently throughout the hallways [facility] [place] [facility]  Patient denies history of STR/VNA  [facility] utilizes [facility] in [facility], has Rx plan and is able to afford all copays  Patient denies history of MH/substance use  Patient identified her spouse as her POA and has AD as well  Patient is employed and she does drive  Patient's spouse will provide transportation at discharge  Patient is upset that she will be missing her children's birthday celebration this weekend and wishes she were able to discharge home at this time  CM validated her frustration  Patient understands that she is not yet medically stable to leave  Patient does not appear to have any needs at this time  CM Department will continue to assess for needs and will follow through discharge  CM reviewed discharge planning process including the following: identifying caregivers at home, preference for d/c planning needs, availability of treatment team to discuss questions or concerns patient and/or family may have regarding diagnosis, plan of care, old or new medications and discharge planning   CM will continue to follow for care coordination and update assessment as necessary

## 2018-09-14 NOTE — PROGRESS NOTES
Progress Note -Surgery NATALIE Donald 46 y o  female MRN: [id number]  Unit/Bed#: -01 Encounter: [id number]      Subjective/Objective     Subjective: Denies abdominal pain  Passing gas, no BM  NGT 1 2L past 24 hours       Objective:     /76 (BP Location: Right arm)   Pulse 91   Temp 97 6 °F (36 4 °C) (Oral)   Resp 16   Wt 77 2 kg (170 lb 3 1 oz)   SpO2 93%   BMI 29 21 kg/m²       Intake/Output Summary (Last 24 hours) at 09/14/18 0802  Last data filed at 09/14/18 0348   Gross per 24 hour   Intake               30 ml   Output             1750 ml   Net            -1720 ml       Invasive Devices     Peripheral Intravenous Line            Peripheral IV 09/10/18 Left Antecubital 3 days          Drain            NG/OG/Enteral Tube Nasogastric 16 Fr Left nares 3 days                Physical Exam:  General appearance: alert and oriented, in no acute distress  Lungs: clear to auscultation bilaterally  Heart: regular rate and rhythm, S1, S2 normal, no murmur, click, rub or gallop  Abdomen: soft, non tender to palpation, incisions c/d/i      Current Facility-Administered Medications:     enoxaparin (LOVENOX) subcutaneous injection 40 mg, 40 mg, Subcutaneous, Daily, Eleanora Fresh, PA-C, 40 mg at 09/13/18 0824    HYDROmorphone (DILAUDID) injection 0 5 mg, 0 5 mg, Intravenous, Q3H PRN, Eleanora Fresh, PA-C, 0 5 mg at 09/12/18 0023    HYDROmorphone (DILAUDID) injection 1 mg, 1 mg, Intravenous, Q3H PRN, Eleanora Fresh, PA-C, 1 mg at 09/11/18 0136    lactated ringers infusion, 100 mL/hr, Intravenous, Continuous, Eleanora Fresh, PA-C, Last Rate: 100 mL/hr at 09/14/18 0620, 100 mL/hr at 09/14/18 0620    melatonin tablet 6 mg, 6 mg, Oral, HS PRN, Pierce Avila MD, 6 mg at 09/13/18 0231    ondansetron [facility]) injection 4 mg, 4 mg, Intravenous, Q6H PRN, Eleanora Fresh, PA-C, 4 mg at 09/11/18 0146    pantoprazole (PROTONIX) injection 40 mg, 40 mg, Intravenous, Q24H [address], Carmelita Rollins PA-C, 40 mg at 09/13/18 1156    phenol (CHLORASEPTIC) 1 4 % mucosal liquid 1 spray, 1 spray, Mouth/Throat, Q2H PRN, Richardson Balderas PA-C, 1 spray at 09/12/18 2111              Lab, Imaging and other studies:I have personally reviewed pertinent lab results  , CBC: No results found for: WBC, HGB, HCT, MCV, PLT, ADJUSTEDWBC, MCH, MCHC, RDW, MPV, NRBC, CMP: No results found for: NA, K, CL, CO2, ANIONGAP, BUN, CREATININE, GLUCOSE, CALCIUM, AST, ALT, ALKPHOS, PROT, ALBUMIN, BILITOT, EGFR  Labs in chart were reviewed  Lab Results   Component Value Date    WBC 6 32 09/13/2018    HGB 11 3 (L) 09/13/2018    HCT 33 6 (L) 09/13/2018     09/13/2018     Lab Results   Component Value Date     09/13/2018    K 3 5 09/13/2018    CL 99 (L) 09/13/2018    CO2 26 09/13/2018    BUN 13 09/13/2018    CREATININE 0 79 09/13/2018       VTE Pharmacologic Prophylaxis: Enoxaparin (Lovenox)  VTE Mechanical Prophylaxis: sequential compression device    Assessment:    46year old female presenting with SBO, s/p lap sigmoid resection    Plan:    -encourage ambulation  - patient passing gas/no BM  - Keep NGT  - possibly rescan in the next few days if no BM  - pain and nausea control  - will replace potassium  - patient may shower  - DVT ppx    Patient Active Problem List   Diagnosis    Diverticulitis large intestine w/o perforation or abscess w/o bleeding    [facility] ([facility] )          This text is generated with voice recognition software  There may be translation, syntax,  or grammatical errors  If you have any questions, please contact the dictating provider      Zara Gage PA-C

## 2018-09-14 NOTE — CASE MANAGEMENT
Continued Stay Review    Date: 9/14/2018  Passing gas, no BM  NGT 1 2L past 24 hours  Vital Signs: /76 (BP Location: Right arm)   Pulse 91   Temp 97 6 °F (36 4 °C) (Oral)   Resp 16   Wt 77 2 kg (170 lb 3 1 oz)   SpO2 93%   BMI 29 21 kg/m²     Medications:   Scheduled Meds:   Current Facility-Administered Medications:  enoxaparin 40 mg Subcutaneous Daily    HYDROmorphone 0 5 mg Intravenous Q3H PRN    HYDROmorphone 1 mg Intravenous Q3H PRN    lactated ringers 100 mL/hr Intravenous Continuous Last Rate: 100 mL/hr (09/14/18 0620)   melatonin 6 mg Oral HS PRN    ondansetron 4 mg Intravenous Q6H PRN    pantoprazole 40 mg Intravenous Q24H [facility]    phenol 1 spray Mouth/Throat Q2H PRN    potassium chloride 20 mEq Intravenous Q2H Last Rate: 20 mEq (09/14/18 0924)     Continuous Infusions:   lactated ringers 100 mL/hr Last Rate: 100 mL/hr (09/14/18 0620)     PRN Meds: not used  Abnormal Labs/Diagnostic Results: wbc 6 94   hgb 11 5, hct 34 5   K 3 3  Anion gap 16  Age/Sex: 46 y o  female     Assessment/Plan: 46year old female presenting with SBO, s/p lap sigmoid resection     Plan:  -encourage ambulation  - patient passing gas/no BM  - Keep NGT  - possibly rescan in the next few days if no BM  - pain and nausea control  - will replace potassium  - patient may shower  - DVT ppx    Discharge Plan: to be determined  Thank you,  [facility]  Utilization Review Department  Phone: [phone]; Fax [phone]  ATTENTION: Please call with any questions or concerns to [phone]  and carefully follow the prompts so that you are directed to the right person  Send all requests for admission clinical reviews, approved or denied determinations and any other requests to fax [phone]   All voicemails are confidential

## 2018-09-14 NOTE — CASE MANAGEMENT
Continued Stay Review    Date:9/12/2018  CC: Denies bowel function  Pain much improved from admission  NGT with 1150 output yesterday    Vital Signs:    /77   Pulse 90   Temp 98 7 °F (37 1 °C)   Resp 16   Wt 77 2 kg (170 lb 3 1 oz)   SpO2 94%   BMI 29 21 kg/m²   I/O last 24 hours: In: 2580 [P O :120; I V :2400; NG/GT:60]  Out: 1800 [Urine:650; Emesis/NG output:1150]       Medications:   Scheduled Meds:   Current Facility-Administered Medications:  lovenox 40 mg  Sq daily  Continuous Infusions:   lactated ringers 100 mL/hr Last Rate: 100 mL/hr (09/14/18 0620)     PRN Meds: HYDROmorphone 0 5 iv - used x 1      Phenol - used x 3  Abnormal Labs/Diagnostic Results:   Wbc 4 17   hgb 11 1, hct 33 5     Age/Sex: 46 y o  female     Assessment/Plan: 46year old female with sbo  Plan:  Continue NGT  Continue IVF  dvt ppx  Check labs  Ambulate  Imagining to be determined by Dr Kashif Nicolas dependant on patients progress  Per Dr Kashif Nicolas - Still no flatus  Continue NG, IV  Discharge Plan: to be determined  Thank you,  [facility]  Utilization Review Department  Phone: [phone]; Fax [phone]  ATTENTION: Please call with any questions or concerns to [phone]  and carefully follow the prompts so that you are directed to the right person  Send all requests for admission clinical reviews, approved or denied determinations and any other requests to fax [phone]   All voicemails are confidential

## 2018-09-14 NOTE — CASE MANAGEMENT
Continued Stay Review    Date: 9/13/2018  CC: Passing gas  Has not had a BM  NGT 3L output yesterday  Cramping abdominal pain    Vital Signs: /73 (BP Location: Right arm)   Pulse 87   Temp 98 9 °F (37 2 °C) (Oral)   Resp 18   Wt 77 2 kg (170 lb 3 1 oz)   SpO2 94%   BMI 29 21 kg/m²   Intake/Output Summary (Last 24 hours) at 09/13/18 0815  Last data filed at 09/13/18 0700    Gross per 24 hour   Intake              960 ml   Output             3350 ml   Net            -2390 ml       Medications:   Scheduled Meds:   Current Facility-Administered Medications:  enoxaparin 40 mg Subcutaneous Daily NATALIE Arevalo-GARRICK    HYDROmorphone 0 5 mg Intravenous Q3H PRN NATALIE Arevalo-GARRICK    HYDROmorphone 1 mg Intravenous Q3H PRN NATALIE Arevalo-GARRICK    lactated ringers 100 mL/hr Intravenous Continuous Patricia Rodriguez PA-C Last Rate: 100 mL/hr (09/14/18 0620)   melatonin 6 mg Oral HS PRN Obey Painter MD    ondansetron 4 mg Intravenous Q6H PRN Patricia Rodriguez PA-C    pantoprazole 40 mg Intravenous Q24H [facility] Carmelita Rollins PA-C    phenol 1 spray Mouth/Throat Q2H PRN Diana Mishra PA-C      Continuous Infusions:   lactated ringers 100 mL/hr Last Rate: 100 mL/hr (09/14/18 0620)     PRN Meds:     Melatonin - used x 1    Abnormal Labs/Diagnostic Results:  Wbc 6 32, hgb 11 3, hct 33 6  Cl 99    Age/Sex: 46 y o  female     Assessment/Plan: Patient  is passing some gas  The abdomen is soft and flat, as well as being nontender  If no function returns in the next few days, we will consider repeating the CT  For now, I recommend observation  Discharge Plan: to be determined  Thank you,  [facility]  Utilization Review Department  Phone: [phone]; Fax [phone]  ATTENTION: Please call with any questions or concerns to [phone]  and carefully follow the prompts so that you are directed to the right person     Send all requests for admission clinical reviews, approved or denied determinations and any other requests to fax [phone]   All voicemails are confidential

## 2018-09-15 PROCEDURE — C9113 INJ PANTOPRAZOLE SODIUM, VIA: HCPCS | Performed by: PHYSICIAN ASSISTANT

## 2018-09-15 RX ADMIN — ENOXAPARIN SODIUM 40 MG: 40 INJECTION SUBCUTANEOUS at 08:38

## 2018-09-15 RX ADMIN — SODIUM CHLORIDE, SODIUM LACTATE, POTASSIUM CHLORIDE, AND CALCIUM CHLORIDE 100 ML/HR: .6; .31; .03; .02 INJECTION, SOLUTION INTRAVENOUS at 03:03

## 2018-09-15 RX ADMIN — PANTOPRAZOLE SODIUM 40 MG: 40 INJECTION, POWDER, FOR SOLUTION INTRAVENOUS at 08:38

## 2018-09-15 RX ADMIN — SODIUM CHLORIDE, SODIUM LACTATE, POTASSIUM CHLORIDE, AND CALCIUM CHLORIDE 100 ML/HR: .6; .31; .03; .02 INJECTION, SOLUTION INTRAVENOUS at 12:49

## 2018-09-15 RX ADMIN — SODIUM CHLORIDE, SODIUM LACTATE, POTASSIUM CHLORIDE, AND CALCIUM CHLORIDE 100 ML/HR: .6; .31; .03; .02 INJECTION, SOLUTION INTRAVENOUS at 22:14

## 2018-09-15 NOTE — PLAN OF CARE
Problem: Potential for Falls  Goal: Patient will remain free of falls  INTERVENTIONS:  - Assess patient frequently for physical needs  -  Identify cognitive and physical deficits and behaviors that affect risk of falls  -  [place] fall precautions as indicated by assessment   - Educate patient/family on patient safety including physical limitations  - Instruct patient to call for assistance with activity based on assessment  - Modify environment to reduce risk of injury  - Consider OT/PT consult to assist with strengthening/mobility   Outcome: Progressing      Problem: Prexisting or High Potential for Compromised Skin Integrity  Goal: Skin integrity is maintained or improved  INTERVENTIONS:  - Identify patients at risk for skin breakdown  - Assess and monitor skin integrity  - Assess and monitor nutrition and hydration status  - Monitor labs (i e  albumin)  - Assess for incontinence   - Turn and reposition patient  - Assist with mobility/ambulation  - Relieve pressure over bony prominences  - Avoid friction and shearing  - Provide appropriate hygiene as needed including keeping skin clean and dry  - Evaluate need for skin moisturizer/barrier cream  - Collaborate with interdisciplinary team (i e  Nutrition, Rehabilitation, etc )   - Patient/family teaching   Outcome: Progressing      Problem: Nutrition/Hydration-ADULT  Goal: Nutrient/Hydration intake appropriate for improving, restoring or maintaining nutritional needs  Monitor and assess patient's nutrition/hydration status for malnutrition (ex- brittle hair, bruises, dry skin, pale skin and conjunctiva, muscle wasting, smooth red tongue, and disorientation)  Collaborate with interdisciplinary team and initiate plan and interventions as ordered  Monitor patient's weight and dietary intake as ordered or per policy  Utilize nutrition screening tool and intervene per policy   Determine patient's food preferences and provide high-protein, high-caloric foods as appropriate       INTERVENTIONS:  - Monitor oral intake, urinary output, labs, and treatment plans  - Assess nutrition and hydration status and recommend course of action  - Evaluate amount of meals eaten  - Assist patient with eating if necessary   - Allow adequate time for meals  - Recommend/ encourage appropriate diets, oral nutritional supplements, and vitamin/mineral supplements  - Order, calculate, and assess calorie counts as needed  - Recommend, monitor, and adjust tube feedings and TPN/PPN based on assessed needs  - Assess need for intravenous fluids  - Provide specific nutrition/hydration education as appropriate  - Include patient/family/caregiver in decisions related to nutrition   Outcome: Progressing      Problem: PAIN - ADULT  Goal: Verbalizes/displays adequate comfort level or baseline comfort level  Interventions:  - Encourage patient to monitor pain and request assistance  - Assess pain using appropriate pain scale  - Administer analgesics based on type and severity of pain and evaluate response  - Implement non-pharmacological measures as appropriate and evaluate response  - Consider cultural and social influences on pain and pain management  - Notify physician/advanced practitioner if interventions unsuccessful or patient reports new pain   Outcome: Progressing      Problem: INFECTION - ADULT  Goal: Absence or prevention of progression during hospitalization  INTERVENTIONS:  - Assess and monitor for signs and symptoms of infection  - Monitor lab/diagnostic results  - Monitor all insertion sites, i e  indwelling lines, tubes, and drains  - Monitor endotracheal (as able) and nasal secretions for changes in amount and color  - [place] appropriate cooling/warming therapies per order  - Administer medications as ordered  - Instruct and encourage patient and family to use good hand hygiene technique  - Identify and instruct in appropriate isolation precautions for identified infection/condition Outcome: Progressing

## 2018-09-15 NOTE — NURSING NOTE
Pt refusing to let me get bloodwork at this time  She stated that she had bloodwork yesterday and would let us try sometime later  Educated her about why we do the bloodwork so early in the AM and still insisted on waiting a little while

## 2018-09-15 NOTE — PROGRESS NOTES
Patient resting in chair after ambulating in hallway  NGT clamped  Clear liquid dinner tray in room  Patient offers no complaints at this time  Will continue to monitor

## 2018-09-15 NOTE — PROGRESS NOTES
Progress Note -Surgery PA  Stefani Mackenzie 46 y o  female MRN: [id number]  Unit/Bed#: -01 Encounter: [id number]      Assessment:  46year old female with sbo s/p lap sigmoid resection   Plan:  -ambulate  -continue clears  -pain control as needed  -dvt ppx  -monitor for BM      Subjective/Objective     Subjective:Feels well  Tolerating liquids  No BM  + flatus    Objective:     /75 (BP Location: Right arm)   Pulse 94   Temp 99 3 °F (37 4 °C) (Oral)   Resp 16   Ht 5' 4" (1 626 m)   Wt 77 2 kg (170 lb 3 1 oz)   SpO2 94%   BMI 29 21 kg/m²   I/O last 24 hours: In: 7040 [P O :450;  I V :6590]  Out: 1300 [Urine:750; Emesis/NG output:550]        Intake/Output Summary (Last 24 hours) at 09/15/18 0726  Last data filed at 09/15/18 0314   Gross per 24 hour   Intake             7040 ml   Output             1300 ml   Net             5740 ml       Invasive Devices     Peripheral Intravenous Line            Peripheral IV 09/14/18 Left Forearm less than 1 day                Physical Exam:  /75 (BP Location: Right arm)   Pulse 94   Temp 99 3 °F (37 4 °C) (Oral)   Resp 16   Ht 5' 4" (1 626 m)   Wt 77 2 kg (170 lb 3 1 oz)   SpO2 94%   BMI 29 21 kg/m²   General appearance: alert and oriented, in no acute distress and alert  Lungs: clear to auscultation bilaterally  Heart: regular rate and rhythm, S1, S2 normal, no murmur, click, rub or gallop  Abdomen: soft, non-tender; bowel sounds normal; no masses,  no organomegaly      Current Facility-Administered Medications:     enoxaparin (LOVENOX) subcutaneous injection 40 mg, 40 mg, Subcutaneous, Daily, NATALIE Gomez-GARRICK, 40 mg at 09/14/18 1011    HYDROmorphone (DILAUDID) injection 0 5 mg, 0 5 mg, Intravenous, Q3H PRN, Vega Yu, PA-C, 0 5 mg at 09/12/18 0023    HYDROmorphone (DILAUDID) injection 1 mg, 1 mg, Intravenous, Q3H PRN, Vega Yu, PA-C, 1 mg at 09/11/18 0136    lactated ringers infusion, 100 mL/hr, Intravenous, Continuous, Vega Yu, KENNEY, Last Rate: 100 mL/hr at 09/15/18 0303, 100 mL/hr at 09/15/18 0303    melatonin tablet 6 mg, 6 mg, Oral, HS PRN, Toni Bañuelos MD, 6 mg at 09/13/18 0231    ondansetron [facility]) injection 4 mg, 4 mg, Intravenous, Q6H PRN, Guille Dominguez PA-C, 4 mg at 09/11/18 0146    pantoprazole (PROTONIX) injection 40 mg, 40 mg, Intravenous, Q24H [address], Carmelita Rollins PA-C, 40 mg at 09/14/18 1012    phenol (CHLORASEPTIC) 1 4 % mucosal liquid 1 spray, 1 spray, Mouth/Throat, Q2H PRN, Gunjan Schulte PA-C, 1 spray at 09/12/18 2111           Lab, Imaging and other studies:  CBC:   Lab Results   Component Value Date    WBC 6 04 09/14/2018    HGB 11 5 09/14/2018    HCT 34 5 (L) 09/14/2018    MCV 87 09/14/2018     09/14/2018    MCH 29 1 09/14/2018    MCHC 33 3 09/14/2018    RDW 12 4 09/14/2018    MPV 9 1 09/14/2018    NRBC 0 09/14/2018   , CMP:   Lab Results   Component Value Date     09/14/2018    K 3 3 (L) 09/14/2018     09/14/2018    CO2 21 09/14/2018    BUN 11 09/14/2018    CREATININE 0 77 09/14/2018    CALCIUM 8 9 09/14/2018    EGFR 89 09/14/2018         VTE Pharmacologic Prophylaxis: Sequential compression device (Venodyne)  and Enoxaparin (Lovenox)  VTE Mechanical Prophylaxis: sequential compression device    Rounds performed with nursing

## 2018-09-16 ENCOUNTER — APPOINTMENT (INPATIENT)
Dept: CT IMAGING | Facility: HOSPITAL | Age: 53
DRG: 390 | End: 2018-09-16
Payer: COMMERCIAL

## 2018-09-16 PROCEDURE — C9113 INJ PANTOPRAZOLE SODIUM, VIA: HCPCS | Performed by: PHYSICIAN ASSISTANT

## 2018-09-16 PROCEDURE — 74177 CT ABD & PELVIS W/CONTRAST: CPT

## 2018-09-16 RX ADMIN — PANTOPRAZOLE SODIUM 40 MG: 40 INJECTION, POWDER, FOR SOLUTION INTRAVENOUS at 08:03

## 2018-09-16 RX ADMIN — SODIUM CHLORIDE, SODIUM LACTATE, POTASSIUM CHLORIDE, AND CALCIUM CHLORIDE 100 ML/HR: .6; .31; .03; .02 INJECTION, SOLUTION INTRAVENOUS at 19:47

## 2018-09-16 RX ADMIN — ENOXAPARIN SODIUM 40 MG: 40 INJECTION SUBCUTANEOUS at 08:03

## 2018-09-16 RX ADMIN — IOHEXOL 100 ML: 350 INJECTION, SOLUTION INTRAVENOUS at 13:37

## 2018-09-16 RX ADMIN — IOHEXOL 50 ML: 240 INJECTION, SOLUTION INTRATHECAL; INTRAVASCULAR; INTRAVENOUS; ORAL at 12:01

## 2018-09-16 RX ADMIN — SODIUM CHLORIDE, SODIUM LACTATE, POTASSIUM CHLORIDE, AND CALCIUM CHLORIDE 100 ML/HR: .6; .31; .03; .02 INJECTION, SOLUTION INTRAVENOUS at 07:58

## 2018-09-16 NOTE — CASE MANAGEMENT
Thank you,  [address]  Utilization Review Department  Phone: [phone]; Fax [phone]  ATTENTION: Please call with any questions or concerns to [phone]  and carefully follow the prompts so that you are directed to the right person  Send all requests for admission clinical reviews, approved or denied determinations and any other requests to fax [phone]  All voicemails are confidential    Continued Stay Review    Date: 09/15/2018    Vital Signs: /61 (BP Location: Right arm)   Pulse (!) 108   Temp 97 9 °F (36 6 °C) (Oral)   Resp 16   Ht 5' 4" (1 626 m)   Wt 77 2 kg (170 lb 3 1 oz)   SpO2 97%   BMI 29 21 kg/m²     Medications:   Scheduled Meds:   Current Facility-Administered Medications:  enoxaparin 40 mg Subcutaneous Daily    HYDROmorphone 0 5 mg Intravenous Q3H PRN    HYDROmorphone 1 mg Intravenous Q3H PRN    lactated ringers 100 mL/hr Intravenous Continuous Last Rate: 100 mL/hr (09/15/18 1249)   melatonin 6 mg Oral HS PRN    ondansetron 4 mg Intravenous Q6H PRN    pantoprazole 40 mg Intravenous Q24H [facility]    phenol 1 spray Mouth/Throat Q2H PRN      Continuous Infusions:   lactated ringers 100 mL/hr Last Rate: 100 mL/hr (09/15/18 1249)     PRN Meds:     Abnormal Labs/Diagnostic Results: 09/15/2018 potassium 3 3, anion gap 16, HCT 34 5    Age/Sex: 46 y o  female     Assessment/Plan: 46 y o  Female with status post small bowel obtruction tolerating NG clamping with clear liquid diet overnight, now tolerating NG removal  No bowel movement, advance diet as tolerated  Follow up CT scan if no bowel movement in 24 hrs  dvt ppx and pain control  Discharge Plan: tbd

## 2018-09-16 NOTE — PROGRESS NOTES
Progress Note -Surgery PA  Laura Rashaun 46 y o  female MRN: [id number]  Unit/Bed#: -01 Encounter: [id number]      Assessment:  46year old female with SBO after sigmoid resection  Plan:  -continue diet as tolerated  -ambulate  -dvt ppx  -possible CT vs one more day of observation  -pain control as needed  Subjective/Objective     Subjective: Still reports some pain that comes and goes  Tolerating diet  Small BM  Objective:     /66 (BP Location: Left arm)   Pulse 94   Temp 98 1 °F (36 7 °C) (Oral)   Resp 18   Ht 5' 4" (1 626 m)   Wt 77 2 kg (170 lb 3 1 oz)   SpO2 95%   BMI 29 21 kg/m²   I/O last 24 hours: In: 2638 3 [P O :720;  I V :1918 3]  Out: 2550 [Urine:2550]        Intake/Output Summary (Last 24 hours) at 09/16/18 0734  Last data filed at 09/16/18 0352   Gross per 24 hour   Intake          2638 34 ml   Output             2550 ml   Net            88 34 ml       Invasive Devices     Peripheral Intravenous Line            Peripheral IV 09/14/18 Left Forearm 1 day                Physical Exam:  /66 (BP Location: Left arm)   Pulse 94   Temp 98 1 °F (36 7 °C) (Oral)   Resp 18   Ht 5' 4" (1 626 m)   Wt 77 2 kg (170 lb 3 1 oz)   SpO2 95%   BMI 29 21 kg/m²   General appearance: alert and oriented, in no acute distress and alert  Lungs: clear to auscultation bilaterally  Heart: regular rate and rhythm, S1, S2 normal, no murmur, click, rub or gallop  Abdomen: soft, non-tender; bowel sounds normal; no masses,  no organomegaly      Current Facility-Administered Medications:     enoxaparin (LOVENOX) subcutaneous injection 40 mg, 40 mg, Subcutaneous, Daily, Ede Hope PA-C, 40 mg at 09/15/18 2072    HYDROmorphone (DILAUDID) injection 0 5 mg, 0 5 mg, Intravenous, Q3H PRN, Ede Hope PA-C, 0 5 mg at 09/12/18 0023    HYDROmorphone (DILAUDID) injection 1 mg, 1 mg, Intravenous, Q3H PRN, Ede Hope PA-C, 1 mg at 09/11/18 0136    lactated ringers infusion, 100 mL/hr, Intravenous, Continuous, Annella Phi, PA-C, Last Rate: 100 mL/hr at 09/15/18 2214, 100 mL/hr at 09/15/18 2214    melatonin tablet 6 mg, 6 mg, Oral, HS PRN, Nati Blas MD, 6 mg at 09/13/18 0231    ondansetron [facility]) injection 4 mg, 4 mg, Intravenous, Q6H PRN, Annella Phi, PA-C, 4 mg at 09/11/18 0146    pantoprazole (PROTONIX) injection 40 mg, 40 mg, Intravenous, Q24H [address], Carmelita Rollins, PA-C, 40 mg at 09/15/18 0838    phenol (CHLORASEPTIC) 1 4 % mucosal liquid 1 spray, 1 spray, Mouth/Throat, Q2H PRN, [facility], PA-C, 1 spray at 09/12/18 2111           Lab, Imaging and other studies:CBC: No results found for: WBC, HGB, HCT, MCV, PLT, ADJUSTEDWBC, MCH, MCHC, RDW, MPV, NRBC, CMP: No results found for: NA, K, CL, CO2, ANIONGAP, BUN, CREATININE, GLUCOSE, CALCIUM, AST, ALT, ALKPHOS, PROT, ALBUMIN, BILITOT, EGFR      VTE Pharmacologic Prophylaxis: Sequential compression device (Venodyne)  and Enoxaparin (Lovenox)  VTE Mechanical Prophylaxis: sequential compression device    Rounds performed with nursing

## 2018-09-17 VITALS
SYSTOLIC BLOOD PRESSURE: 144 MMHG | OXYGEN SATURATION: 96 % | RESPIRATION RATE: 18 BRPM | WEIGHT: 170.19 LBS | HEIGHT: 64 IN | BODY MASS INDEX: 29.06 KG/M2 | DIASTOLIC BLOOD PRESSURE: 78 MMHG | TEMPERATURE: 98.4 F | HEART RATE: 85 BPM

## 2018-09-17 PROCEDURE — C9113 INJ PANTOPRAZOLE SODIUM, VIA: HCPCS | Performed by: PHYSICIAN ASSISTANT

## 2018-09-17 RX ADMIN — PANTOPRAZOLE SODIUM 40 MG: 40 INJECTION, POWDER, FOR SOLUTION INTRAVENOUS at 09:12

## 2018-09-17 RX ADMIN — ENOXAPARIN SODIUM 40 MG: 40 INJECTION SUBCUTANEOUS at 09:12

## 2018-09-17 RX ADMIN — SODIUM CHLORIDE, SODIUM LACTATE, POTASSIUM CHLORIDE, AND CALCIUM CHLORIDE 100 ML/HR: .6; .31; .03; .02 INJECTION, SOLUTION INTRAVENOUS at 06:07

## 2018-09-17 NOTE — PLAN OF CARE

## 2018-09-17 NOTE — PROGRESS NOTES
Progress Note - General Surgery   Constance Cavanaugh 46 y o  female MRN: [id number]  Unit/Bed#: -01 Encounter: [id number]    Assessment/Plan:  46 y o  female with SBO s/p sigmoid resection     -will review CT scan with Dr Maci Chappell, likely discharge home  -continue diet as tolerated  -ambulate  -dvt ppx      Subjective/Objective     Subjective: No acute events overnight  We discussed her CT scan and its improvement in conjunction with her return of bowel function and general resolution of symptoms  However, she is worried about becoming obstructed again in the future and having the NGT placed again  Patient states that her pain is improved and well controlled  She is ambulating and endorsing BM/flatus  She denies f/c/n/v       Objective:     Vitals: Blood pressure 135/85, pulse 82, temperature 97 8 °F (36 6 °C), temperature source Oral, resp  rate 18, height 5' 4" (1 626 m), weight 77 2 kg (170 lb 3 1 oz), SpO2 96 %  ,Body mass index is 29 21 kg/m²  I/O       09/15 0701 - 09/16 0700 09/16 0701 - 09/17 0700 09/17 0701 - 09/18 0700    P  O  720 600     I V  (mL/kg) 1918 3 (24 8) 973 3 (12 6)     Total Intake(mL/kg) 2638 3 (34 2) 1573 3 (20 4)     Urine (mL/kg/hr) 2550 (1 4) 750 (0 4) 500 (7 9)    Total Output 2550 750 500    Net +88 3 +823 3 -500           Unmeasured Urine Occurrence 1 x      Unmeasured Stool Occurrence 1 x 3 x           Physical Exam:  GEN: NAD  HEENT: MMM  CV: RRR  Lung: Normal effort  Ab: Soft, NT/ND  Extrem: No CCE  Neuro: A+Ox3    Lab, Imaging and other studies:  CT abdomen pelvis w contrast   Final Result by Elia Pandey MD (09/16 4476)      Previously seen proximal to mid small bowel distention is improved, consistent with resolving postoperative ileus or partial small bowel obstruction               Workstation performed: [id number]         CT abdomen pelvis with contrast   Final Result by Dale Weinberg MD (09/10 8362)      Diffuse gastric and small bowel dilation with a probable transition point in the right lower quadrant suspicious for partial small bowel obstruction  Small amount of fluid in the proximal colon without colonic distention  Diffuse colonic diverticulosis without acute diverticulitis  The study was marked in [facility] for immediate notification              Workstation performed: [id number]             VTE Pharmacologic Prophylaxis: Enoxaparin (Lovenox)  VTE Mechanical Prophylaxis: sequential compression device

## 2018-09-17 NOTE — PLAN OF CARE
Problem: Potential for Falls  Goal: Patient will remain free of falls  INTERVENTIONS:  - Assess patient frequently for physical needs  -  Identify cognitive and physical deficits and behaviors that affect risk of falls  -  [place] fall precautions as indicated by assessment   - Educate patient/family on patient safety including physical limitations  - Instruct patient to call for assistance with activity based on assessment  - Modify environment to reduce risk of injury  - Consider OT/PT consult to assist with strengthening/mobility   Outcome: Progressing  Self  Walks in hallway  Gait steady    Problem: Prexisting or High Potential for Compromised Skin Integrity  Goal: Skin integrity is maintained or improved  INTERVENTIONS:  - Identify patients at risk for skin breakdown  - Assess and monitor skin integrity  - Assess and monitor nutrition and hydration status  - Monitor labs (i e  albumin)  - Assess for incontinence   - Turn and reposition patient  - Assist with mobility/ambulation  - Relieve pressure over bony prominences  - Avoid friction and shearing  - Provide appropriate hygiene as needed including keeping skin clean and dry  - Evaluate need for skin moisturizer/barrier cream  - Collaborate with interdisciplinary team (i e  Nutrition, Rehabilitation, etc )   - Patient/family teaching   Outcome: Progressing      Problem: Nutrition/Hydration-ADULT  Goal: Nutrient/Hydration intake appropriate for improving, restoring or maintaining nutritional needs  Monitor and assess patient's nutrition/hydration status for malnutrition (ex- brittle hair, bruises, dry skin, pale skin and conjunctiva, muscle wasting, smooth red tongue, and disorientation)  Collaborate with interdisciplinary team and initiate plan and interventions as ordered  Monitor patient's weight and dietary intake as ordered or per policy  Utilize nutrition screening tool and intervene per policy   Determine patient's food preferences and provide high-protein, high-caloric foods as appropriate  INTERVENTIONS:  - Monitor oral intake, urinary output, labs, and treatment plans  - Assess nutrition and hydration status and recommend course of action  - Evaluate amount of meals eaten  - Assist patient with eating if necessary   - Allow adequate time for meals  - Recommend/ encourage appropriate diets, oral nutritional supplements, and vitamin/mineral supplements  - Order, calculate, and assess calorie counts as needed  - Recommend, monitor, and adjust tube feedings and TPN/PPN based on assessed needs  - Assess need for intravenous fluids  - Provide specific nutrition/hydration education as appropriate  - Include patient/family/caregiver in decisions related to nutrition   Outcome: Progressing      Problem: PAIN - ADULT  Goal: Verbalizes/displays adequate comfort level or baseline comfort level  Interventions:  - Encourage patient to monitor pain and request assistance  - Assess pain using appropriate pain scale  - Administer analgesics based on type and severity of pain and evaluate response  - Implement non-pharmacological measures as appropriate and evaluate response  - Consider cultural and social influences on pain and pain management  - Notify physician/advanced practitioner if interventions unsuccessful or patient reports new pain   Outcome: Progressing  Pain improved  Just mild discomfort

## 2018-09-17 NOTE — CASE MANAGEMENT
Notification of Discharge  This is a Notification of Discharge from our facility [address]  Please be advised that this patient has been discharge from our facility  Below you will find the admission and discharge date and time including the patients disposition  PRESENTATION DATE: 9/10/2018  2:38 PM  IP ADMISSION DATE: 9/10/18 1726  DISCHARGE DATE: 9/17/2018  1:00 PM  DISPOSITION: Home/Self Care    [address]  [facility] in the [facility]  Phone: [phone]; Fax [phone]  ATTENTION: The Network Utilization Review Department is now centralized for our 9 Facilities  Make a note that we have a new phone and fax numbers for our Department  Please call with any questions or concerns to [phone] and carefully follow the prompts so that you are directed to the right person  All voicemails are confidential  Fax any determinations, approvals, denials, and requests for initial or continue stay review clinical to [phone]  Due to HIGH CALL volume, it would be easier if you could please send faxed requests to expedite your requests and in part, help us provide discharge notifications faster    Reference [id number]

## 2018-09-17 NOTE — DISCHARGE INSTRUCTIONS
Bowel Obstruction   WHAT YOU NEED TO KNOW:   What is a bowel obstruction? A bowel obstruction occurs when your large or small intestine is completely or partly blocked  The blockage prevents food and waste from passing through normally  What causes a bowel obstruction? · Adhesions  are bands of scar tissue that may form after a surgery  An adhesion attaches your intestine to a nearby organ or to the wall of your abdomen  This may pull your intestine out of place and cause an obstruction  · A hernia  occurs when part of the intestine bulges through the muscle wall of the abdomen  The hernia may cause an obstruction if the intestine becomes trapped  · A tumor  may cause a blockage of the intestine  · A foreign body  can block the intestine  A foreign body is something other than food that is swallowed  · A sliding or folding of part of the intestine  into another portion of the intestine may cause a bowel obstruction  · Medical conditions  such as Crohn disease and diverticulitis cause changes to the intestine that may cause a bowel obstruction  · A twisting of the intestine  may cause a bowel obstruction  What are the signs and symptoms of a bowel obstruction? · Nausea and vomiting    · Abdominal pain    · Enlarged abdomen    · Decreased or no bowel movements or gas  How is a bowel obstruction diagnosed? · Blood tests  may show if you have an infection, or if you are dehydrated  Dehydration can develop when your intestines cannot absorb liquid properly  · An x-ray  takes pictures of the organs inside your abdomen  The pictures are used to look for an obstruction  · A CT or MRI scan  may be used to take pictures of your intestines  The pictures may show the location and cause of your blockage  You may be given a dye before the pictures are taken to help healthcare providers see the blockage better  Tell the healthcare provider if you have ever had an allergic reaction to contrast dye  Do not enter the MRI room with anything metal  Metal can cause serious injury  Tell the healthcare provider if you have any metal in or on your body  · An ultrasound  uses sound waves to show pictures of your intestines on a monitor  An ultrasound may be done to find the location of your obstruction  How is a bowel obstruction treated? · An IV  may be used to give you liquids and nutrition  You may not be able to eat or drink anything until your healthcare provider says it is okay  · A nasogastric tube  may be put into your nose  The tube passes through your throat and is guided into your stomach  The tube will be attached to a suction device that removes air and fluid from your stomach  · Antibiotics  may be given to help treat or prevent an infection caused by bacteria  · Surgery  may be done to treat the cause of the blockage  When should I contact my healthcare provider? · You have nausea and are vomiting  · Your abdomen is enlarged  · You cannot pass a bowel movement or gas  · You lose weight without trying  · You have blood in your bowel movement  · You have questions or concerns about your condition or care  When should I seek immediate care or call 911? · You have severe abdominal pain that does not get better  · Your heart is beating faster than normal for you  · You have a fever  CARE AGREEMENT:   You have the right to help plan your care  Learn about your health condition and how it may be treated  Discuss treatment options with your caregivers to decide what care you want to receive  You always have the right to refuse treatment  The above information is an  only  It is not intended as medical advice for individual conditions or treatments  Talk to your doctor, nurse or pharmacist before following any medical regimen to see if it is safe and effective for you    © 2017 [employer] No Information is for End User's use only and may not be sold, redistributed or otherwise used for commercial purposes  All illustrations and images included in CareNotes® are the copyrighted property of A D A M [employer]  or Vernon Nicholson

## 2018-09-21 ENCOUNTER — APPOINTMENT (EMERGENCY)
Dept: CT IMAGING | Facility: HOSPITAL | Age: 53
End: 2018-09-21
Payer: COMMERCIAL

## 2018-09-21 ENCOUNTER — HOSPITAL ENCOUNTER (EMERGENCY)
Facility: HOSPITAL | Age: 53
Discharge: HOME | End: 2018-09-21
Attending: EMERGENCY MEDICINE
Payer: COMMERCIAL

## 2018-09-21 VITALS
TEMPERATURE: 99.4 F | SYSTOLIC BLOOD PRESSURE: 123 MMHG | DIASTOLIC BLOOD PRESSURE: 73 MMHG | HEART RATE: 76 BPM | OXYGEN SATURATION: 96 % | RESPIRATION RATE: 16 BRPM

## 2018-09-21 DIAGNOSIS — J98.11 ATELECTASIS OF BOTH LUNGS: Primary | ICD-10-CM

## 2018-09-21 LAB
ALBUMIN SERPL BCP-MCNC: 3.6 G/DL (ref 3.5–5)
ALP SERPL-CCNC: 91 U/L (ref 46–116)
ALT SERPL W P-5'-P-CCNC: 38 U/L (ref 12–78)
ANION GAP SERPL CALCULATED.3IONS-SCNC: 8 MMOL/L (ref 4–13)
APTT PPP: 31 SECONDS (ref 24–36)
AST SERPL W P-5'-P-CCNC: 17 U/L (ref 5–45)
BACTERIA UR QL AUTO: ABNORMAL /HPF
BASOPHILS # BLD AUTO: 0.02 THOUSANDS/ΜL (ref 0–0.1)
BASOPHILS NFR BLD AUTO: 0 % (ref 0–1)
BILIRUB SERPL-MCNC: 0.2 MG/DL (ref 0.2–1)
BILIRUB UR QL STRIP: NEGATIVE
BUN SERPL-MCNC: 12 MG/DL (ref 5–25)
CALCIUM SERPL-MCNC: 9.2 MG/DL (ref 8.3–10.1)
CHLORIDE SERPL-SCNC: 102 MMOL/L (ref 100–108)
CLARITY UR: CLEAR
CO2 SERPL-SCNC: 28 MMOL/L (ref 21–32)
COLOR UR: YELLOW
CREAT SERPL-MCNC: 0.97 MG/DL (ref 0.6–1.3)
EOSINOPHIL # BLD AUTO: 0.15 THOUSAND/ΜL (ref 0–0.61)
EOSINOPHIL NFR BLD AUTO: 2 % (ref 0–6)
ERYTHROCYTE [DISTWIDTH] IN BLOOD BY AUTOMATED COUNT: 12.3 % (ref 11.6–15.1)
GFR SERPL CREATININE-BSD FRML MDRD: 67 ML/MIN/1.73SQ M
GLUCOSE SERPL-MCNC: 110 MG/DL (ref 65–140)
GLUCOSE UR STRIP-MCNC: NEGATIVE MG/DL
HCT VFR BLD AUTO: 35.4 % (ref 34.8–46.1)
HGB BLD-MCNC: 11.7 G/DL (ref 11.5–15.4)
HGB UR QL STRIP.AUTO: ABNORMAL
IMM GRANULOCYTES # BLD AUTO: 0.03 THOUSAND/UL (ref 0–0.2)
IMM GRANULOCYTES NFR BLD AUTO: 1 % (ref 0–2)
INR PPP: 0.99 (ref 0.86–1.17)
KETONES UR STRIP-MCNC: NEGATIVE MG/DL
LEUKOCYTE ESTERASE UR QL STRIP: NEGATIVE
LYMPHOCYTES # BLD AUTO: 1.19 THOUSANDS/ΜL (ref 0.6–4.47)
LYMPHOCYTES NFR BLD AUTO: 19 % (ref 14–44)
MCH RBC QN AUTO: 29.5 PG (ref 26.8–34.3)
MCHC RBC AUTO-ENTMCNC: 33.1 G/DL (ref 31.4–37.4)
MCV RBC AUTO: 89 FL (ref 82–98)
MONOCYTES # BLD AUTO: 0.41 THOUSAND/ΜL (ref 0.17–1.22)
MONOCYTES NFR BLD AUTO: 7 % (ref 4–12)
NEUTROPHILS # BLD AUTO: 4.4 THOUSANDS/ΜL (ref 1.85–7.62)
NEUTS SEG NFR BLD AUTO: 71 % (ref 43–75)
NITRITE UR QL STRIP: NEGATIVE
NON-SQ EPI CELLS URNS QL MICRO: ABNORMAL /HPF
NRBC BLD AUTO-RTO: 0 /100 WBCS
PH UR STRIP.AUTO: 5.5 [PH] (ref 4.5–8)
PLATELET # BLD AUTO: 365 THOUSANDS/UL (ref 149–390)
PMV BLD AUTO: 9.2 FL (ref 8.9–12.7)
POTASSIUM SERPL-SCNC: 3.7 MMOL/L (ref 3.5–5.3)
PROT SERPL-MCNC: 7.6 G/DL (ref 6.4–8.2)
PROT UR STRIP-MCNC: NEGATIVE MG/DL
PROTHROMBIN TIME: 12.8 SECONDS (ref 11.8–14.2)
RBC # BLD AUTO: 3.97 MILLION/UL (ref 3.81–5.12)
RBC #/AREA URNS AUTO: ABNORMAL /HPF
SODIUM SERPL-SCNC: 138 MMOL/L (ref 136–145)
SP GR UR STRIP.AUTO: 1.01 (ref 1–1.03)
TROPONIN I SERPL-MCNC: <0.02 NG/ML
UROBILINOGEN UR QL STRIP.AUTO: 0.2 E.U./DL
WBC # BLD AUTO: 6.2 THOUSAND/UL (ref 4.31–10.16)
WBC #/AREA URNS AUTO: ABNORMAL /HPF

## 2018-09-21 PROCEDURE — 85610 PROTHROMBIN TIME: CPT | Performed by: EMERGENCY MEDICINE

## 2018-09-21 PROCEDURE — 84484 ASSAY OF TROPONIN QUANT: CPT | Performed by: EMERGENCY MEDICINE

## 2018-09-21 PROCEDURE — 80053 COMPREHEN METABOLIC PANEL: CPT | Performed by: EMERGENCY MEDICINE

## 2018-09-21 PROCEDURE — 71275 CT ANGIOGRAPHY CHEST: CPT

## 2018-09-21 PROCEDURE — 81001 URINALYSIS AUTO W/SCOPE: CPT

## 2018-09-21 PROCEDURE — 85025 COMPLETE CBC W/AUTO DIFF WBC: CPT | Performed by: EMERGENCY MEDICINE

## 2018-09-21 PROCEDURE — 99284 EMERGENCY DEPT VISIT MOD MDM: CPT

## 2018-09-21 PROCEDURE — 93005 ELECTROCARDIOGRAM TRACING: CPT

## 2018-09-21 PROCEDURE — 36415 COLL VENOUS BLD VENIPUNCTURE: CPT | Performed by: EMERGENCY MEDICINE

## 2018-09-21 PROCEDURE — 96361 HYDRATE IV INFUSION ADD-ON: CPT

## 2018-09-21 PROCEDURE — 85730 THROMBOPLASTIN TIME PARTIAL: CPT | Performed by: EMERGENCY MEDICINE

## 2018-09-21 PROCEDURE — 74177 CT ABD & PELVIS W/CONTRAST: CPT

## 2018-09-21 PROCEDURE — 96374 THER/PROPH/DIAG INJ IV PUSH: CPT

## 2018-09-21 RX ORDER — KETOROLAC TROMETHAMINE 30 MG/ML
15 INJECTION, SOLUTION INTRAMUSCULAR; INTRAVENOUS ONCE
Status: COMPLETED | OUTPATIENT
Start: 2018-09-21 | End: 2018-09-21

## 2018-09-21 RX ORDER — OXYCODONE HYDROCHLORIDE AND ACETAMINOPHEN 5; 325 MG/1; MG/1
1 TABLET ORAL EVERY 6 HOURS PRN
Qty: 12 TABLET | Refills: 0 | Status: SHIPPED | OUTPATIENT
Start: 2018-09-21 | End: 2019-07-17 | Stop reason: ALTCHOICE

## 2018-09-21 RX ADMIN — SODIUM CHLORIDE 1000 ML: 0.9 INJECTION, SOLUTION INTRAVENOUS at 17:32

## 2018-09-21 RX ADMIN — IOHEXOL 100 ML: 350 INJECTION, SOLUTION INTRAVENOUS at 18:31

## 2018-09-21 RX ADMIN — KETOROLAC TROMETHAMINE 15 MG: 30 INJECTION, SOLUTION INTRAMUSCULAR at 17:30

## 2018-09-21 NOTE — DISCHARGE SUMMARY
Discharge Summary - General Surgery   Laura Rodney 46 y o  female MRN: [id number]  Unit/Bed#: -01 Encounter: [id number]    Admission Date: 9/10/2018     Discharge Date: 9/17/2018    Admitting Diagnosis: Nausea [R11 0]  Small bowel obstruction ([address] ) [K56 609]    Discharge Diagnosis: Same    Attending: Dr Carroll Singh Physician(s): None    Procedures Performed: None    Pathology: None    History of Present Illness:  Laura Rodney is a 46y o  year old female with history of laparoscopic sigmoid resection, mobilization of splenic flexure on 8/24/18 by Dr Jennifer Saunders presents to ED complaining of epigastric cramping for 5 days associated with nausea and vomiting today  Associated symptoms are bloating  Poor appetite for 2 days      Cross sectional imaging obtained showed diffuse gastric and small bowel dilation with a probable transition point in the right lower quadrant suspicious for partial small bowel obstruction  Small amount of fluid in the proximal colon without colonic distention   Diffuse colonic diverticulosis without acute diverticulitis  Hospital Course:   Upon admission to [facility], the patient's small bowel obstruction was managed conservatively with bowel rest, nasogastric decompression, intravenous fluids, and pain control  Over the next few days the patient responded well to this therapy and was successfully transitioned to a clear liquid diet  Repeat cross-sectional imaging was consistent with a resolving partial small bowel obstruction with passage of oral contrast as far as the terminal ileum/ascending colon  On the morning of discharge the patient was noted to have return of bowel function and was tolerating a surgical soft diet  At the time of her discharge her pain was well controlled, she was tolerating her diet, voiding without difficulty and ambulating without assistance      Condition at Discharge: good     Discharge instructions/Information to patient and family:   See after visit summary for information provided to patient and family  Provisions for Follow-Up Care:  See after visit summary for information related to follow-up care and any pertinent home health orders  Disposition: Home    Planned Readmission: No    Discharge Statement   I spent 30 minutes discharging the patient  This time was spent on the day of discharge  I had direct contact with the patient on the day of discharge  Additional documentation is required if more than 30 minutes were spent on discharge  Discharge Medications:  See after visit summary for reconciled discharge medications provided to patient and family

## 2018-09-21 NOTE — DISCHARGE INSTRUCTIONS
Atelectasis   WHAT YOU NEED TO KNOW:   Atelectasis happens when the alveoli in your lungs cannot expand fully  This may cause part or all of your lung to collapse  The exchange of oxygen and carbon dioxide cannot take place in the alveoli, when your lung collapses  Atelectasis happens very often after surgery  Atelectasis may last for days  It may be caused by not being able to take a deep breath due to blocked airways or surgery  It may also be due to disease, infection, or trauma  DISCHARGE INSTRUCTIONS:   Call 911 if:  You cough up blood continuously or more than 3 teaspoons  Return to the emergency department if:   · Your symptoms return or get worse  · You have a fever  · You cough up blood  Contact your healthcare provider if:   · You are coughing up a large amount of mucus  · You are more tired than usual      · You have trouble catching your breath while you exercise or walk up stairs  · You have questions or concerns about your condition or care  Follow up with your healthcare provider as directed: You may need more tests  Write down your questions so you remember to ask them during your visits  Do not smoke:  Smoking can make your symptoms worse  It is never too late to quit  Ask your healthcare provider for more information if you need help quitting  Manage and prevent atelectasis:   · Postural drainage  means getting into positions that help mucus drain  Postural drainage is sometimes used with chest percussion  (gentle clapping to help move the mucus out of your lungs)  Ask your healthcare provider for more information about postural drainage and chest percussion  · Frequent coughing  can help clear mucus from your lungs  · Deep breathing exercises  help improve your lung function and reduce your risk for atelectasis  An incentive spirometer may be used after surgery to help you breathe deeply and slowly   Ask your healthcare provider for more information on deep breathing exercises  · Change your position  to promote lung expansion and reduce the risk for infection  Sit on the side of the bed or walk frequently after surgery as directed  · Drink liquids as directed  to help loosen mucus  Ask how much liquid to drink each day and which liquids are best for you  © 2017 [employer] No Information is for End User's use only and may not be sold, redistributed or otherwise used for commercial purposes  All illustrations and images included in CareNotes® are the copyrighted property of [employer]  or Vernon Nicholson  The above information is an  only  It is not intended as medical advice for individual conditions or treatments  Talk to your doctor, nurse or pharmacist before following any medical regimen to see if it is safe and effective for you

## 2018-09-21 NOTE — ED PROVIDER NOTES
History  Chief Complaint   Patient presents with    Back Pain     Pt c/o upper/middle/lower left sided back pain, states she was in the hospital "laying on her back for a week" denies urinary symptoms       History provided by:  Patient   used: No     80-year-old female with recent sigmoidectomy be due to recurrent diverticulitis then complicated by partial small-bowel obstruction and hospital admission now presents with left-sided back and flank pain present over the last few days and worsening  Pain is moderate, sharp when she takes a deep breath and also increases with twisting of the trunk  She reports while she was in the hospital she had some cramping in her left calf but did not think much of it  No prior history of DVT, PE  Denies any shortness of breath or chest pain  No back tenderness on exam   She does still have some phil-incisional abdominal tenderness but this is minimal   No signs of infection  Differential diagnosis includes PE, back strain, UTI although less likely  Plan CT PE study including abdomen and pelvis, EKG, labs and will re-evaluate  Prior to Admission Medications   Prescriptions Last Dose Informant Patient Reported? Taking?   lisinopril (ZESTRIL) 20 mg tablet   Yes No   Sig: Take 20 mg by mouth daily      Facility-Administered Medications: None       Past Medical History:   Diagnosis Date    Diverticulitis     Endometriosis     Hypertension        Past Surgical History:   Procedure Laterality Date    BREAST IMPLANT Bilateral      SECTION       SECTION      COLONOSCOPY      x4    EYE SURGERY      biklat eyelid surgery    FRACTURE SURGERY      right arm     HEMICOLOECTOMY W/ ANASTOMOSIS N/A 2018    Procedure: LAPAROSCOPIC SIGMOID RESECTION, MOBILIZATION OF splenic flexure;   Surgeon: Javon Webster MD;  Location: [facility];  Service: Colorectal    HYSTERECTOMY      LAPAROSCOPIC LYSIS INTESTINAL ADHESIONS      ND SIGMOIDOSCOPY FLX DX W/COLLJ SPEC BR/WA IF PFRMD N/A 8/24/2018    Procedure: Loc Salguero;  Surgeon: Laura Pereira MD;  Location: [facility];  Service: Colorectal       Family History   Problem Relation Age of Onset    Hypertension Mother     Hypertension Father     Breast cancer Maternal Grandmother     Breast cancer Maternal Aunt      I have reviewed and agree with the history as documented  Social History   Substance Use Topics    Smoking status: Never Smoker    Smokeless tobacco: Never Used    Alcohol use No        Review of Systems   Constitutional: Positive for appetite change  Negative for activity change  Respiratory: Negative for chest tightness and shortness of breath  Cardiovascular: Negative for chest pain and leg swelling  Gastrointestinal: Negative for abdominal pain, nausea and vomiting  Genitourinary: Positive for flank pain  Musculoskeletal: Positive for back pain  Neurological: Negative for dizziness, weakness and headaches  All other systems reviewed and are negative  Physical Exam  Physical Exam   Constitutional: She is oriented to person, place, and time  She appears well-developed and well-nourished  No distress  HENT:   Head: Normocephalic  Mouth/Throat: Oropharynx is clear and moist    Neck: Normal range of motion  No JVD present  Cardiovascular: Normal rate, regular rhythm, normal heart sounds and intact distal pulses  Pulmonary/Chest: Effort normal and breath sounds normal  She exhibits no tenderness  Abdominal: Soft  She exhibits no distension  Mild phil-incisional tenderness  Musculoskeletal: Normal range of motion  She exhibits no edema or tenderness  No reproducible back or flank tenderness  Neurological: She is alert and oriented to person, place, and time  Skin: Skin is warm and dry  Psychiatric: She has a normal mood and affect  Her behavior is normal    Nursing note and vitals reviewed        Vital Signs  ED Triage Vitals [09/21/18 1621]   Temperature Pulse Respirations Blood Pressure SpO2   99 4 °F (37 4 °C) 92 18 136/72 98 %      Temp Source Heart Rate Source Patient Position - Orthostatic VS BP Location FiO2 (%)   Oral Monitor Sitting Left arm --      Pain Score       Worst Possible Pain           Vitals:    09/21/18 1621 09/21/18 1800   BP: 136/72 123/73   Pulse: 92 76   Patient Position - Orthostatic VS: Sitting        Visual Acuity      ED Medications  Medications   sodium chloride 0 9 % bolus 1,000 mL (0 mL Intravenous Stopped 9/21/18 1835)   ketorolac (TORADOL) injection 15 mg (15 mg Intravenous Given 9/21/18 1730)   iohexol (OMNIPAQUE) 350 MG/ML injection (MULTI-DOSE) 100 mL (100 mL Intravenous Given 9/21/18 1831)       Diagnostic Studies  Results Reviewed     Procedure Component Value Units Date/Time    Urine Microscopic [[id number]]  (Abnormal) Collected:  09/21/18 1731    Lab Status:  Final result Specimen:  Urine from Urine, Clean Catch Updated:  09/21/18 1748     RBC, UA 0-1 (A) /hpf      WBC, UA 0-5 /hpf      Epithelial Cells Occasional /hpf      Bacteria, UA None Seen /hpf     Troponin I [[id number]]  (Normal) Collected:  09/21/18 1721    Lab Status:  Final result Specimen:  Blood from Arm, Left Updated:  09/21/18 1748     Troponin I <0 02 ng/mL     Comprehensive metabolic panel [[id number]] Collected:  09/21/18 1721    Lab Status:  Final result Specimen:  Blood from Arm, Left Updated:  09/21/18 1746     Sodium 138 mmol/L      Potassium 3 7 mmol/L      Chloride 102 mmol/L      CO2 28 mmol/L      ANION GAP 8 mmol/L      BUN 12 mg/dL      Creatinine 0 97 mg/dL      Glucose 110 mg/dL      Calcium 9 2 mg/dL      AST 17 U/L      ALT 38 U/L      Alkaline Phosphatase 91 U/L      Total Protein 7 6 g/dL      Albumin 3 6 g/dL      Total Bilirubin 0 20 mg/dL      eGFR 67 ml/min/1 73sq m     Narrative:         National Kidney Disease Education Program recommendations are as follows:  GFR calculation is accurate only with a steady state creatinine  Chronic Kidney disease less than 60 ml/min/1 73 sq  meters  Kidney failure less than 15 ml/min/1 73 sq  meters      Protime-INR [[id number]]  (Normal) Collected:  09/21/18 1721    Lab Status:  Final result Specimen:  Blood from Arm, Left Updated:  09/21/18 1740     Protime 12 8 seconds      INR 0 99    APTT [[id number]]  (Normal) Collected:  09/21/18 1721    Lab Status:  Final result Specimen:  Blood from Arm, Left Updated:  09/21/18 1740     PTT 31 seconds     CBC and differential [[id number]] Collected:  09/21/18 1721    Lab Status:  Final result Specimen:  Blood from Arm, Left Updated:  09/21/18 1728     WBC 6 20 Thousand/uL      RBC 3 97 Million/uL      Hemoglobin 11 7 g/dL      Hematocrit 35 4 %      MCV 89 fL      MCH 29 5 pg      MCHC 33 1 g/dL      RDW 12 3 %      MPV 9 2 fL      Platelets 129 Thousands/uL      nRBC 0 /100 WBCs      Neutrophils Relative 71 %      Immat GRANS % 1 %      Lymphocytes Relative 19 %      Monocytes Relative 7 %      Eosinophils Relative 2 %      Basophils Relative 0 %      Neutrophils Absolute 4 40 Thousands/µL      Immature Grans Absolute 0 03 Thousand/uL      Lymphocytes Absolute 1 19 Thousands/µL      Monocytes Absolute 0 41 Thousand/µL      Eosinophils Absolute 0 15 Thousand/µL      Basophils Absolute 0 02 Thousands/µL     ED Urine Macroscopic [[id number]]  (Abnormal) Collected:  09/21/18 1731    Lab Status:  Final result Specimen:  Urine Updated:  09/21/18 1719     Color, UA Yellow     Clarity, UA Clear     pH, UA 5 5     Leukocytes, UA Negative     Nitrite, UA Negative     Protein, UA Negative mg/dl      Glucose, UA Negative mg/dl      Ketones, UA Negative mg/dl      Urobilinogen, UA 0 2 E U /dl      Bilirubin, UA Negative     Blood, UA Trace (A)     Specific [place], UA 1 010    Narrative:       CLINITEK RESULT                 PE Study with CT Abdomen and Pelvis with contrast   Final Result by Yun Dean MD (09/21 1853)      No evidence of pulmonary embolism  Bibasilar subsegmental atelectasis, left larger than right is again identified  Follow-up two-view chest x-ray recommended in 1 month to ensure resolution  Postsurgical changes to the pelvis  There is fatty infiltration around the sigmoid colon could be postsurgical or less likely infectious/inflammatory etiology  No well-defined fluid collection identified  Colonic diverticulosis  Workstation performed: [id number]                    Procedures  ECG 12 Lead Documentation  Date/Time: 9/21/2018 5:41 PM  Performed by: Madelyn Mathews  Authorized by: Madelyn Mathews     Indications / Diagnosis:  ACS  ECG reviewed by me, the ED Provider: yes    Patient location:  ED  Previous ECG:     Previous ECG:  Compared to current    Comparison ECG info:  7/28/18    Similarity:  No change  Rate:     ECG rate:  71  Rhythm:     Rhythm: sinus rhythm    Ectopy:     Ectopy: none    QRS:     QRS axis:  Normal  Conduction:     Conduction: normal    ST segments:     ST segments:  Normal  T waves:     T waves: normal             Phone Contacts  ED Phone Contact    ED Course                               MDM  Number of Diagnoses or Management Options  Atelectasis of both lungs:   Diagnosis management comments: 59-year-old female with recent sigmoidectomy due to diverticulitis complications followed by small bowel obstruction and prolonged hospital stay presented from home with left-sided back and flank pain  No associated urinary complaints, hematuria, fever, chills  CT of the chest/abdomen/pelvis notable for left lower lobe atelectasis and a small area of right lower lobe atelectasis  No sign of PE, infection, dissection, pneumothorax  Suspect her symptoms are from trying to breathe through atelectasis  Her plan pain control and advising increase in breath size  She has an incentive spirometer at home         Amount and/or Complexity of Data Reviewed  Clinical lab tests: reviewed and ordered  Tests in the radiology section of CPT®: reviewed and ordered  Independent visualization of images, tracings, or specimens: yes    Patient Progress  Patient progress: improved    CritCare Time    Disposition  Final diagnoses:   Atelectasis of both lungs     Time reflects when diagnosis was documented in both MDM as applicable and the Disposition within this note     Time User Action Codes Description Comment    9/21/2018  7:23 PM Reji Dwyer Add [J98 11] Atelectasis of both lungs       ED Disposition     ED Disposition Condition Comment    Discharge  Fatoumata Severe discharge to home/self care  Condition at discharge: Good        Follow-up Information     Follow up With Specialties Details Why Contact Info    Beverly Berry, [address] [address] [place] [address]  [phone]            Discharge Medication List as of 9/21/2018  7:25 PM      START taking these medications    Details   oxyCODONE-acetaminophen (PERCOCET) 5-325 mg per tablet Take 1 tablet by mouth every 6 (six) hours as needed for moderate pain Max Daily Amount: 4 tablets, Starting Fri 9/21/2018, Print         CONTINUE these medications which have NOT CHANGED    Details   lisinopril (ZESTRIL) 20 mg tablet Take 20 mg by mouth daily, Historical Med           No discharge procedures on file      ED Provider  Electronically Signed by           Nathanael Velasquez MD  09/24/18 6911

## 2018-09-23 LAB
ATRIAL RATE: 74 BPM
P AXIS: 32 DEGREES
PR INTERVAL: 154 MS
QRS AXIS: 42 DEGREES
QRSD INTERVAL: 94 MS
QT INTERVAL: 370 MS
QTC INTERVAL: 402 MS
T WAVE AXIS: 51 DEGREES
VENTRICULAR RATE: 71 BPM

## 2018-09-23 PROCEDURE — 93010 ELECTROCARDIOGRAM REPORT: CPT | Performed by: INTERNAL MEDICINE

## 2018-09-28 ENCOUNTER — OFFICE VISIT (OUTPATIENT)
Dept: PULMONOLOGY | Facility: MEDICAL CENTER | Age: 53
End: 2018-09-28
Payer: COMMERCIAL

## 2018-09-28 VITALS
HEART RATE: 90 BPM | BODY MASS INDEX: 28.34 KG/M2 | SYSTOLIC BLOOD PRESSURE: 102 MMHG | RESPIRATION RATE: 16 BRPM | HEIGHT: 64 IN | DIASTOLIC BLOOD PRESSURE: 68 MMHG | WEIGHT: 166 LBS | OXYGEN SATURATION: 96 % | TEMPERATURE: 97.8 F

## 2018-09-28 DIAGNOSIS — R06.9 BREATHING PROBLEM: ICD-10-CM

## 2018-09-28 DIAGNOSIS — J98.11 ATELECTASIS OF BOTH LUNGS: Primary | ICD-10-CM

## 2018-09-28 PROCEDURE — 99204 OFFICE O/P NEW MOD 45 MIN: CPT | Performed by: INTERNAL MEDICINE

## 2018-09-28 PROCEDURE — 94010 BREATHING CAPACITY TEST: CPT | Performed by: INTERNAL MEDICINE

## 2018-09-28 NOTE — PROGRESS NOTES
Assessment/Plan:       Problem List Items Addressed This Visit        Respiratory    Atelectasis of both lungs - Primary     CTA of chest done September 21st when patient presented to ER complaining of left upper to mid posterior flank pain showed evidence of mild bibasilar atelectasis  The atelectasis is slightly more prominent on the left than right  There is no evidence of pulmonary embolism or pneumonia  No pleural effusions  She is status post elective laparoscopic sigmoid resection on August 24, 2018 by colorectal surgeon Dr Stalin Chu  This was done for recurrent diverticulitis  On September 10th she did present to [facility] complaining of abdominal pain and distention as well some nausea vomiting and was found to have a partial small bowel obstruction likely due to adhesions  She was noted to have a significant amount of he usually is when she had her surgery  Patient has had prior C-sections  A nasogastric tube was placed and she was admitted to the hospital for few days and with conservative treatment her small-bowel obstruction resolved  She is not having any vomiting or abdominal distention but she complains of some persistent left posterior flank pain and some nausea  No fever chills  A CT scan of her abdomen/pevis done on September 21st when she came to the ER showed no evidence of any abscess or bowel obstruction  There is some mild fatty infiltration with the sigmoid colon used to be but this may just be inflammatory  White blood cell count was normal and patient was afebrile  She was thus sent home  She does get some relief with her pain taking Advil or Percocet  I did communicate with Dr Monica Zhang and patient has a follow-up appointment in his office on Monday October 1st   I told her she had any problem with vomiting or worsening abdominal pain then she should go to the emergency room for re-evaluation  She does not have an incentive spirometer at home    She is not having any shortness of breath  Oxygen saturation at rest and with ambulation is normal     I did review with her to CT images of the chest abdomen pelvis that was done on September 21st     Spirometry done today shows normal lung volumes           Other Visit Diagnoses     Breathing problem        Relevant Orders    POCT spirometry (Completed)            Return if symptoms worsen or fail to improve  All questions are answered to the patient's satisfaction and understanding  She verbalizes understanding  She is encouraged to call with any further questions or concerns  Portions of the record may have been created with voice recognition software  Occasional wrong word or "sound a like" substitutions may have occurred due to the inherent limitations of voice recognition software  Read the chart carefully and recognize, using context, where substitutions have occurred  Electronically Signed by Zuri Harris DO    ______________________________________________________________________    Chief Complaint:   Chief Complaint   Patient presents with   [address] for atelectasis    Cough    Breathing Problem     causing cough        Patient ID: Chilango Greene is a 46 y o  y o  female has a past medical history of Diverticulitis; Endometriosis; and Hypertension  9/28/2018  Patient presents today for initial visit  HPI    Chilango Greene was referred here for pulmonary evaluation after being seen in [facility] ER on 09/21/2018  She underwent elective laparoscopic sigmoid resection on August 24, 2018 by Dr Yessica Lockhart  She has a history of recurrent diverticulitis  She had laparoscopic sigmoid resection with mobilization of the splenic flexure and lysis of adhesions  She had extensive intra-abdominal adhesions noted  She was discharged from the hospital on August 27    She then on September 10th presented to [facility] emergency room complaining of epigastric abdominal discomfort and cramping for 5 days  She had associated nausea vomiting started the day that she came to the ER and had poor appetite and abdominal bloating  CT scan of the abdomen and pelvis done with IV contrast on September 10th showed a distended stomach with fluid in it as well as some distended small bowel with some air-fluid levels and there is a suspected small bowel obstruction  No sign of any ascites or abscess  A nasogastric tube was placed and patient was placed on bowel rest for few days  IV fluids were given  She improved and nasogastric tube was able to be discontinued and she was able tolerate a oral diet prior to her discharge  Then on September 21st she presented to [facility] Emergency room complaining of some left mid to upper posterior flank pain that was present for few days and appeared to be worsening  Sometimes the pain was worse with deep breath or twisting of her body  She was only having partial relief with Advil ordered Percocet  She was not having any shortness of breath or leg swelling  No vomiting  A CTA of the chest was done to exclude pulmonary embolism and there was no evidence of any pulmonary emboli  There is some subsegmental atelectasis of the lower lobes left greater than right but no evidence of pneumonia or pleural effusions  A CT scan of the abdomen pelvis was also done and there is no evidence of any bowel obstruction or abscess  There is some mild fatty infiltration noted around sigmoid colon region perhaps due to inflammation  CBC was normal with white blood cell count 6 2 hemoglobin 11 7  Reneegalileo NaranjoFleischer is complaining of some persistent left mid to upper lumbar and left flank pain  She is not having any difficulty breathing or cough  Nelle Fleischer also complains of some persistent nausea and poor oral intake  She states her pain is improved with Advil and Percocet  She is trying to avoid the Percocet because of constipation  No vomiting or diarrhea    No fever or chills  No pleuritic chest pain  She is not having any shortness of breath with activity  She does have a history hypertension  She denies any history of asthma or heart disease  She never smoked  Review of Systems   Constitutional: Negative for chills, diaphoresis and fever  Has some left posterior flank pain and nausea   HENT: Negative for postnasal drip and trouble swallowing  Eyes: Negative for redness  Respiratory: Negative for cough, chest tightness, shortness of breath and wheezing  Cardiovascular: Negative for chest pain and leg swelling  Gastrointestinal: Positive for abdominal pain and nausea  Negative for diarrhea and vomiting  Complains of some persistent left posterior upper flank pain and nausea  Pain is constant and waxes and wanes  Has partial relief when takes Advil or Percocet   Endocrine: Negative for polydipsia and polyphagia  Genitourinary: Negative for dysuria  Musculoskeletal: Negative for joint swelling  Neurological: Negative for light-headedness  Social history: She reports that she has never smoked  She has never used smokeless tobacco  She reports that she does not drink alcohol or use drugs  Past surgical history:   Past Surgical History:   Procedure Laterality Date    BREAST IMPLANT Bilateral      SECTION       SECTION      COLONOSCOPY      x4    EYE SURGERY      biklat eyelid surgery    FRACTURE SURGERY      right arm     HEMICOLOECTOMY W/ ANASTOMOSIS N/A 2018    Procedure: LAPAROSCOPIC SIGMOID RESECTION, MOBILIZATION OF splenic flexure;   Surgeon: Solomon Haynes MD;  [facility]: [facility];  Service: Colorectal    HYSTERECTOMY      LAPAROSCOPIC LYSIS INTESTINAL ADHESIONS      AZ SIGMOIDOSCOPY FLX DX W/COLLJ SPEC BR/WA IF PFRMD N/A 2018    Procedure: Jhon Wilkins;  Surgeon: Solomon Haynes MD;  Location: [facility];  Service: Colorectal     Family history:   Family History Problem Relation Age of Onset    Hypertension Mother     Hypertension Father     Breast cancer Maternal Grandmother     Breast cancer Maternal Aunt          There is no immunization history on file for this patient  Current Outpatient Prescriptions   Medication Sig Dispense Refill    lisinopril (ZESTRIL) 20 mg tablet Take 20 mg by mouth daily      oxyCODONE-acetaminophen (PERCOCET) 5-325 mg per tablet Take 1 tablet by mouth every 6 (six) hours as needed for moderate pain Max Daily Amount: 4 tablets (Patient not taking: Reported on 9/28/2018 ) 12 tablet 0     No current facility-administered medications for this visit  Allergies: Patient has no known allergies  Objective:  Vitals:    09/28/18 1131   BP: 102/68   BP Location: Left arm   Patient Position: Sitting   Cuff Size: Standard   Pulse: 90   Resp: 16   Temp: 97 8 °F (36 6 °C)   TempSrc: Tympanic   SpO2: 96%   Weight: 75 3 kg (166 lb)   Height: 5' 4" (1 626 m)   Oxygen Therapy  SpO2: 96 %    Wt Readings from Last 3 Encounters:   09/28/18 75 3 kg (166 lb)   09/10/18 77 2 kg (170 lb 3 1 oz)   08/24/18 77 1 kg (170 lb)     Body mass index is 28 49 kg/m²  Physical Exam   Constitutional: She is oriented to person, place, and time  She appears well-developed and well-nourished  No distress  Room air O2 saturations 97%, no conversational dyspnea   HENT:   Head: Normocephalic  Nose: Nose normal    Mouth/Throat: Oropharynx is clear and moist  No oropharyngeal exudate  Eyes: Conjunctivae are normal    Neck: Neck supple  No JVD present  Cardiovascular: Normal rate, regular rhythm and normal heart sounds  Pulmonary/Chest: Effort normal  No respiratory distress  She has no wheezes  She has no rales  Lung sounds are clear to auscultation  No crackles   Abdominal: Soft  She exhibits no distension  There is no guarding  Musculoskeletal: She exhibits no edema or deformity  Lymphadenopathy:     She has no cervical adenopathy     Neurological: She is alert and oriented to person, place, and time  Skin: Skin is warm and dry  No rash noted  No erythema  Psychiatric: She has a normal mood and affect  Her behavior is normal  Thought content normal      Room air O2 saturation is 96-97% and with ambulation stable at 97%      Lab Review:   Lab Results   Component Value Date     09/21/2018    K 3 7 09/21/2018     09/21/2018    CO2 28 09/21/2018    BUN 12 09/21/2018    CREATININE 0 97 09/21/2018    CALCIUM 9 2 09/21/2018     Lab Results   Component Value Date    WBC 6 20 09/21/2018    HGB 11 7 09/21/2018    HCT 35 4 09/21/2018    MCV 89 09/21/2018     09/21/2018       Diagnostics:  I have personally reviewed pertinent films in PACS  CT of chest performed on 9/21/18  PE protocol revealed mild bilateral subsegmental bibasilar atelectasis  No pleural effusions and no evidence of any pulmonary emboli    Office Spirometry Results: done today  FVC - 2 92 L   84%  FEV1 - 2 32 L  84%  FEV1/FVC% - 79%    Lung volumes are normal         Pe Study With Ct Abdomen And Pelvis With Contrast    Result Date: 9/21/2018  Narrative: CT PULMONARY ANGIOGRAM OF THE CHEST AND CT ABDOMEN AND PELVIS WITH INTRAVENOUS CONTRAST INDICATION:   Back pain, chest pain, rectal sigmoidoscopy, postoperative COMPARISON:  September 16, 2018 TECHNIQUE:  CT examination of the chest, abdomen and pelvis was performed  Thin section CT angiographic technique was used in the chest in order to evaluate for pulmonary embolus and coronal 3D MIP postprocessing was performed on the acquisition scanner  Axial, sagittal, and coronal 2D reformatted images were created from the source data and submitted for interpretation  Radiation dose length product (DLP) for this visit:  538 mGy-cm     This examination, like all CT scans performed in the [place], was performed utilizing techniques to minimize radiation dose exposure, including the use of iterative reconstruction and automated exposure control  IV Contrast:  100 mL of iohexol (OMNIPAQUE) Enteric Contrast:  Enteric contrast was not administered  FINDINGS: CHEST PULMONARY ARTERIAL TREE:  No pulmonary embolus is seen  LUNGS:  Bibasilar subsegmental atelectasis, left larger than right is again identified  Follow-up two-view chest x-ray recommended in 1 month to ensure resolution  PLEURA:  Unremarkable  HEART/AORTA:  Unremarkable for patient's age  MEDIASTINUM AND CHANTELLE:  Unremarkable  CHEST WALL AND LOWER NECK:   Bilateral breast implants  ABDOMEN LIVER/BILIARY TREE:  Unremarkable  GALLBLADDER:  No calcified gallstones  No pericholecystic inflammatory change  SPLEEN:  Unremarkable  PANCREAS:  Unremarkable  ADRENAL GLANDS:  Unremarkable  KIDNEYS/URETERS:  Unremarkable  No hydronephrosis  STOMACH AND BOWEL:  Again seen are anastomotic bowel sutures in the sigmoid colon  Fatty infiltration of the sigmoid colon region is again identified  Postsurgical changes about sigmoid colon and sigmoid mesentery  Colonic diverticulosis  Fatty infiltration about the sigmoid colon possibly postsurgical rather than infectious/inflammatory  APPENDIX:  No findings to suggest appendicitis  ABDOMINOPELVIC CAVITY:  No ascites or free intraperitoneal air  No lymphadenopathy  VESSELS:  Unremarkable for patient's age  PELVIS REPRODUCTIVE ORGANS:  Unremarkable for patient's age  URINARY BLADDER:  Unremarkable  ABDOMINAL WALL/INGUINAL REGIONS:  Anterior pelvic wall postsurgical changes  OSSEOUS STRUCTURES:  No acute fracture or destructive osseous lesion  Impression: No evidence of pulmonary embolism  Bibasilar subsegmental atelectasis, left larger than right is again identified  Follow-up two-view chest x-ray recommended in 1 month to ensure resolution  Postsurgical changes to the pelvis  There is fatty infiltration around the sigmoid colon could be postsurgical or less likely infectious/inflammatory etiology    No well-defined fluid collection identified  Colonic diverticulosis  Workstation performed: [id number]     Ct Abdomen Pelvis W Contrast    Result Date: 9/16/2018  Narrative: CT ABDOMEN AND PELVIS WITH IV CONTRAST INDICATION:   sbo s/p sigmoid resection  COMPARISON:  Abdomen and pelvic CT from 9/10/2018  Op note from 8/24/2018 was reviewed, which stated patient had laparoscopic sigmoid resection for diverticulitis  TECHNIQUE:  CT examination of the abdomen and pelvis was performed  Axial, sagittal, and coronal 2D reformatted images were created from the source data and submitted for interpretation  Radiation dose length product (DLP) for this visit:  449 mGy-cm   This examination, like all CT scans performed in the [facility], was performed utilizing techniques to minimize radiation dose exposure, including the use of iterative reconstruction and automated exposure control  IV Contrast:  100 mL of iohexol (OMNIPAQUE) Enteric Contrast:  Enteric contrast was administered  FINDINGS: ABDOMEN LOWER CHEST:  Mild plate atelectasis in both lower lobes  LIVER/BILIARY TREE:  Unremarkable  GALLBLADDER:  No calcified gallstones  No pericholecystic inflammatory change  SPLEEN:  Unremarkable  PANCREAS:  Unremarkable  ADRENAL GLANDS:  Unremarkable  KIDNEYS/URETERS:  Unremarkable  No hydronephrosis  STOMACH AND BOWEL:  Again seen are anastomotic bowel sutures in the sigmoid colon  Previously seen proximal to mid small bowel distention is improved, consistent with resolving postoperative ileus or partial small bowel obstruction  Oral contrast can be seen in the terminal ileum and in the ascending colon  APPENDIX:  No findings to suggest appendicitis  ABDOMINOPELVIC CAVITY:  No ascites or free intraperitoneal air  No lymphadenopathy  VESSELS:  Unremarkable for patient's age  PELVIS REPRODUCTIVE ORGANS:  Unremarkable for patient's age  URINARY BLADDER:  Unremarkable  ABDOMINAL WALL/INGUINAL REGIONS:  Midline abdominal incision scar   OSSEOUS STRUCTURES:  No acute fracture or destructive osseous lesion  Impression: Previously seen proximal to mid small bowel distention is improved, consistent with resolving postoperative ileus or partial small bowel obstruction  Workstation performed: [id number]     Ct Abdomen Pelvis With Contrast    Result Date: 9/10/2018  Narrative: CT ABDOMEN AND PELVIS WITH IV CONTRAST INDICATION:   upper abd pain, vomiting, distension -- r/o SBO   "general abdpain, vomiting, bowel resection 624/18""52-year-old female about 2 weeks postop from sigmoid resection due to recurrent diverticulitis presented with about 4 days of crampy upper abdominal pain, now with 1 day of nausea and bilious vomiting  Pain is moderate to severe at times  Notes her last bowel movement was yesterday  Had been and smoothie this morning but nothing else  Her incisions have been well healing without drainage  No fevers  No urinary complaints  History of  section, hysterectomy in the past   No history of bowel obstruction  ""  On exam she is very uncomfortable abdomen slightly distended and diffusely tender in the epigastrium and upper quadrants  " COMPARISON:  CT abdomen pelvis 2018  TECHNIQUE:  CT examination of the abdomen and pelvis was performed  Axial, sagittal, and coronal 2D reformatted images were created from the source data and submitted for interpretation  Radiation dose length product (DLP) for this visit:  430 mGy-cm   This examination, like all CT scans performed in the [facility], was performed utilizing techniques to minimize radiation dose exposure, including the use of iterative reconstruction and automated exposure control  IV Contrast:  100 mL of iohexol (OMNIPAQUE) Enteric Contrast:  Enteric contrast was not administered  FINDINGS: ABDOMEN LOWER CHEST:  No clinically significant abnormality identified in the visualized lower chest  LIVER/BILIARY TREE:  Unremarkable   GALLBLADDER:  No calcified gallstones  No pericholecystic inflammatory change  SPLEEN:  Unremarkable  PANCREAS:  Unremarkable  ADRENAL GLANDS:  Unremarkable  KIDNEYS/URETERS:  Unremarkable  No hydronephrosis  STOMACH AND BOWEL:  Distended stomach and small bowel with decompressed terminal ileum; possible right lower quadrant transition point suspicious for bowel obstruction possibly on the basis of adhesions  No bowel pneumatosis  Fluid in the proximal colon without colonic distention  Colonic diverticulosis without acute diverticulitis  Distal colonic anastomosis  APPENDIX:  Not identified  No definite appendicitis  ABDOMINOPELVIC CAVITY:  No ascites or free intraperitoneal air  No lymphadenopathy  VESSELS:  Unremarkable for patient's age  PELVIS REPRODUCTIVE ORGANS:  Unremarkable for patient's age  URINARY BLADDER:  Unremarkable  ABDOMINAL WALL/INGUINAL REGIONS:  Unremarkable  OSSEOUS STRUCTURES:  No acute fracture or destructive osseous lesion  Impression: Diffuse gastric and small bowel dilation with a probable transition point in the right lower quadrant suspicious for partial small bowel obstruction  Small amount of fluid in the proximal colon without colonic distention  Diffuse colonic diverticulosis without acute diverticulitis  The study was marked in [facility] for immediate notification   Workstation performed: [id number]

## 2018-09-29 NOTE — ASSESSMENT & PLAN NOTE
CTA of chest done September 21st when patient presented to ER complaining of left upper to mid posterior flank pain showed evidence of mild bibasilar atelectasis  The atelectasis is slightly more prominent on the left than right  There is no evidence of pulmonary embolism or pneumonia  No pleural effusions  She is status post elective laparoscopic sigmoid resection on August 24, 2018 by colorectal surgeon Dr Ericka Finch  This was done for recurrent diverticulitis  On September 10th she did present to [facility] complaining of abdominal pain and distention as well some nausea vomiting and was found to have a partial small bowel obstruction likely due to adhesions  She was noted to have a significant amount of he usually is when she had her surgery  Patient has had prior C-sections  A nasogastric tube was placed and she was admitted to the hospital for few days and with conservative treatment her small-bowel obstruction resolved  She is not having any vomiting or abdominal distention but she complains of some persistent left posterior flank pain and some nausea  No fever chills  A CT scan of her abdomen/pevis done on September 21st when she came to the ER showed no evidence of any abscess or bowel obstruction  There is some mild fatty infiltration with the sigmoid colon used to be but this may just be inflammatory  White blood cell count was normal and patient was afebrile  She was thus sent home  She does get some relief with her pain taking Advil or Percocet  I did communicate with Dr Deb Juarez and patient has a follow-up appointment in his office on Monday October 1st   I told her she had any problem with vomiting or worsening abdominal pain then she should go to the emergency room for re-evaluation  She does not have an incentive spirometer at home  She is not having any shortness of breath   Oxygen saturation at rest and with ambulation is normal     I did review with her to CT images of the chest abdomen pelvis that was done on September 21st     Spirometry done today shows normal lung volumes

## 2018-10-01 PROBLEM — R10.9 FLANK PAIN: Status: ACTIVE | Noted: 2018-10-01

## 2018-10-05 ENCOUNTER — HOSPITAL ENCOUNTER (OUTPATIENT)
Dept: ULTRASOUND IMAGING | Facility: HOSPITAL | Age: 53
Discharge: HOME | End: 2018-10-05
Attending: COLON & RECTAL SURGERY
Payer: COMMERCIAL

## 2018-10-05 DIAGNOSIS — R10.9 FLANK PAIN: ICD-10-CM

## 2018-10-05 PROCEDURE — 76770 US EXAM ABDO BACK WALL COMP: CPT

## 2018-10-08 ENCOUNTER — TELEPHONE (OUTPATIENT)
Dept: UROLOGY | Facility: MEDICAL CENTER | Age: 53
End: 2018-10-08

## 2018-10-08 NOTE — TELEPHONE ENCOUNTER
Reason for appointment/Complaint/Diagnosis : inflamed kidney after bowel resection    Insurance: William Castillo    History of Cancer? no                       If yes, what kind? Previous urologist?  Dr Pilar Hammer     Records requested/where? No    Outside testing/where? Bowel resection and US results in EPIC  Location Preference for office visit? [facility]    Appointment made for 11/13/18 at 2:45PM at [facility]  New patient paperwork mailed

## 2018-11-08 RX ORDER — LISINOPRIL 5 MG/1
TABLET ORAL
COMMUNITY
Start: 2018-10-03 | End: 2020-05-29

## 2018-11-13 ENCOUNTER — OFFICE VISIT (OUTPATIENT)
Dept: UROLOGY | Facility: CLINIC | Age: 53
End: 2018-11-13
Payer: COMMERCIAL

## 2018-11-13 VITALS
SYSTOLIC BLOOD PRESSURE: 114 MMHG | WEIGHT: 169.4 LBS | DIASTOLIC BLOOD PRESSURE: 76 MMHG | HEART RATE: 80 BPM | BODY MASS INDEX: 28.92 KG/M2 | HEIGHT: 64 IN

## 2018-11-13 DIAGNOSIS — R10.9 FLANK PAIN: Primary | ICD-10-CM

## 2018-11-13 PROCEDURE — 99244 OFF/OP CNSLTJ NEW/EST MOD 40: CPT | Performed by: UROLOGY

## 2018-11-13 NOTE — PROGRESS NOTES
2018    Manuel Van  1965  [id number]    Discussion and Plan    Imaging studies are reviewed at length  It is likely the patient may have had mild left hydronephrosis secondary to the inflammatory changes along the colon which could of also affected the ureter  Additionally this may have been residual effect of prior retention  Clinically she appears to be improving  As a precaution renal bladder ultrasound will be repeated in the next 2 months  All questions answered at this time  1  Flank pain  - US kidney and bladder with pvr; Future    Assessment      Patient Active Problem List   Diagnosis    Diverticulitis large intestine w/o perforation or abscess w/o bleeding    Small bowel obstruction (HCC)    Atelectasis of both lungs    Flank pain       History of Present Illness    Yasmin Álvarez is a 48 y o  female seen today in regards to a history of mild left hydronephrosis noted on ultrasound  Her history is that of having had a relatively recent colon resection  Surgery was uneventful however she did develop urinary retention and obstruction postprocedure early and the development of significant left flank pain  CT scan obtained demonstrated no upper tract abnormalities however pericolonic inflammation was noted on the left  An ultrasound also performed demonstrated mild hydronephrosis with otherwise in excellent ureteral jets  Her symptoms are largely resolved at this point  She denies any prior genitourinary surgical history  No urinary complaints currently noting good flow with otherwise complete bladder emptying sensation      Urinary Symptom Assessment        Past Medical History  Past Medical History:   Diagnosis Date    Diverticulitis     Endometriosis     Hypertension        Past Social History  Past Surgical History:   Procedure Laterality Date    BREAST IMPLANT Bilateral      SECTION       SECTION      COLONOSCOPY      x4    EYE SURGERY      biklat eyelid surgery    FRACTURE SURGERY      right arm     HEMICOLOECTOMY W/ ANASTOMOSIS N/A 8/24/2018    Procedure: LAPAROSCOPIC SIGMOID RESECTION, MOBILIZATION OF splenic flexure; Surgeon: Kendrick Ahumada, MD;  Location: [facility];  Service: Colorectal    HYSTERECTOMY      LAPAROSCOPIC LYSIS INTESTINAL ADHESIONS      MT SIGMOIDOSCOPY FLX DX W/COLLJ SPEC BR/WA IF PFRMD N/A 8/24/2018    Procedure: Polly Fay;  Surgeon: Kendrick Ahumada, MD;  Location: [facility];  Service: Colorectal       Past Family History  Family History   Problem Relation Age of Onset    Hypertension Mother     Hypertension Father     Breast cancer Maternal Grandmother     Breast cancer Maternal Aunt        Past Social history  Social History     Social History    Marital status: /Civil Union     Spouse name: N/A    Number of children: N/A    Years of education: N/A     Occupational History    Not on file  Social History Main Topics    Smoking status: Never Smoker    Smokeless tobacco: Never Used    Alcohol use No    Drug use: No    Sexual activity: Not on file     Other Topics Concern    Not on file     Social History Narrative    No narrative on file       Current Medications  Current Outpatient Prescriptions   Medication Sig Dispense Refill    lisinopril (ZESTRIL) 20 mg tablet Take 20 mg by mouth daily      lisinopril (ZESTRIL) 5 mg tablet       oxyCODONE-acetaminophen (PERCOCET) 5-325 mg per tablet Take 1 tablet by mouth every 6 (six) hours as needed for moderate pain Max Daily Amount: 4 tablets 12 tablet 0     No current facility-administered medications for this visit  Allergies  No Known Allergies    Past Medical History, Social History, Family History, medications and allergies were reviewed  Review of Systems  Review of Systems   Constitutional: Negative  HENT: Negative  Eyes: Negative  Respiratory: Negative  Cardiovascular: Negative  Gastrointestinal: Negative  Endocrine: Negative  Genitourinary: Negative for decreased urine volume, difficulty urinating and hematuria  Musculoskeletal: Negative  Skin: Negative  Allergic/Immunologic: Negative  Neurological: Negative  Hematological: Negative  Psychiatric/Behavioral: Negative  Vitals  Vitals:    11/13/18 1432   BP: 114/76   BP Location: Left arm   Patient Position: Sitting   Cuff Size: Adult   Pulse: 80   Weight: 76 8 kg (169 lb 6 4 oz)   Height: 5' 4" (1 626 m)         Physical Exam    Physical Exam   Constitutional: She is oriented to person, place, and time  She appears well-developed and well-nourished  HENT:   Head: Normocephalic and atraumatic  Eyes: Pupils are equal, round, and reactive to light  Neck: Normal range of motion  Cardiovascular: Normal rate, regular rhythm and normal heart sounds  Pulmonary/Chest: Effort normal and breath sounds normal    Abdominal: Soft  Bowel sounds are normal  She exhibits no distension  There is no tenderness  There is no rebound and no guarding  Musculoskeletal: Normal range of motion  Neurological: She is alert and oriented to person, place, and time  Skin: Skin is warm, dry and intact  Psychiatric: She has a normal mood and affect  Nursing note and vitals reviewed        Results    Below listed labs, pathology results, and radiology images were personally reviewed:    No results found for: PSA  Lab Results   Component Value Date    CALCIUM 9 2 09/21/2018    K 3 7 09/21/2018    CO2 28 09/21/2018     09/21/2018    BUN 12 09/21/2018    CREATININE 0 97 09/21/2018     Lab Results   Component Value Date    WBC 6 20 09/21/2018    HGB 11 7 09/21/2018    HCT 35 4 09/21/2018    MCV 89 09/21/2018     09/21/2018       No results found for this or any previous visit (from the past 1 hour(s)) ]    CT PULMONARY ANGIOGRAM OF THE CHEST AND CT ABDOMEN AND PELVIS WITH INTRAVENOUS CONTRAST     INDICATION:   Back pain, chest pain, rectal sigmoidoscopy, postoperative     COMPARISON:  September 16, 2018     TECHNIQUE:  CT examination of the chest, abdomen and pelvis was performed  Thin section CT angiographic technique was used in the chest in order to evaluate for pulmonary embolus and coronal 3D MIP postprocessing was performed on the acquisition   scanner  Axial, sagittal, and coronal 2D reformatted images were created from the source data and submitted for interpretation      Radiation dose length product (DLP) for this visit:  538 mGy-cm   This examination, like all CT scans performed in the [facility], was performed utilizing techniques to minimize radiation dose exposure, including the use of iterative   reconstruction and automated exposure control      IV Contrast:  100 mL of iohexol (OMNIPAQUE)  Enteric Contrast:  Enteric contrast was not administered      FINDINGS:     CHEST     PULMONARY ARTERIAL TREE:  No pulmonary embolus is seen      LUNGS:  Bibasilar subsegmental atelectasis, left larger than right is again identified  Follow-up two-view chest x-ray recommended in 1 month to ensure resolution      PLEURA:  Unremarkable      HEART/AORTA:  Unremarkable for patient's age      MEDIASTINUM AND CHANTELLE:  Unremarkable      CHEST WALL AND LOWER NECK:   Bilateral breast implants      ABDOMEN     LIVER/BILIARY TREE:  Unremarkable      GALLBLADDER:  No calcified gallstones  No pericholecystic inflammatory change      SPLEEN:  Unremarkable      PANCREAS:  Unremarkable      ADRENAL GLANDS:  Unremarkable      KIDNEYS/URETERS:  Unremarkable  No hydronephrosis      STOMACH AND BOWEL:  Again seen are anastomotic bowel sutures in the sigmoid colon  Fatty infiltration of the sigmoid colon region is again identified  Postsurgical changes about sigmoid colon and sigmoid mesentery  Colonic diverticulosis    Fatty   infiltration about the sigmoid colon possibly postsurgical rather than infectious/inflammatory        APPENDIX:  No findings to suggest appendicitis      ABDOMINOPELVIC CAVITY:  No ascites or free intraperitoneal air  No lymphadenopathy      VESSELS:  Unremarkable for patient's age      PELVIS     REPRODUCTIVE ORGANS:  Unremarkable for patient's age      URINARY BLADDER:  Unremarkable      ABDOMINAL WALL/INGUINAL REGIONS:  Anterior pelvic wall postsurgical changes      OSSEOUS STRUCTURES:  No acute fracture or destructive osseous lesion      IMPRESSION:     No evidence of pulmonary embolism      Bibasilar subsegmental atelectasis, left larger than right is again identified  Follow-up two-view chest x-ray recommended in 1 month to ensure resolution      Postsurgical changes to the pelvis  There is fatty infiltration around the sigmoid colon could be postsurgical or less likely infectious/inflammatory etiology  No well-defined fluid collection identified  Colonic diverticulosis        RENAL ULTRASOUND     INDICATION:   Left-sided flank pain and discomfort  Improving symptomatology  Colon surgery 8/24/2018      COMPARISON: CT 9/21/2018     TECHNIQUE:   Ultrasound of the retroperitoneum was performed with a curvilinear transducer utilizing volumetric sweeps and still imaging techniques       FINDINGS:     KIDNEYS:  Symmetric and normal size  Right kidney:  11 0 x 4 7 cm  Left kidney:  11 1 x 5 5 cm      Right kidney  Normal echogenicity and contour  No suspicious masses detected  No hydronephrosis  No shadowing calculi  No perinephric fluid collections      Left kidney  Normal echogenicity and contour  No suspicious masses detected  There is mild left-sided hydronephrosis  No shadowing calculi  No perinephric fluid collections      URETERS:  Nonvisualized      BLADDER:   Normally distended  No focal thickening or mass lesions  Bilateral ureteral jets detected         IMPRESSION:     Mild left-sided hydronephrosis but with visualized left ureteral jet    In conjunction with prior CT findings which demonstrated postoperative fatty infiltration in the left lower quadrant, distal ureteral spasm or extrinsic narrowing secondary to edema   could explain the ultrasonographic findings    Particularly if symptoms persist, consider reassessment renal ultrasound in several months time to assure resolution       The examination demonstrates a finding requiring imaging follow-up and was logged as such in [address] performed: [id number]

## 2019-07-22 ENCOUNTER — APPOINTMENT (OUTPATIENT)
Dept: LAB | Facility: CLINIC | Age: 54
End: 2019-07-22
Payer: COMMERCIAL

## 2019-07-22 ENCOUNTER — TRANSCRIBE ORDERS (OUTPATIENT)
Dept: LAB | Facility: CLINIC | Age: 54
End: 2019-07-22

## 2019-07-22 DIAGNOSIS — K11.20 SIALOADENITIS OF SUBMANDIBULAR GLAND: ICD-10-CM

## 2019-07-22 LAB
BUN SERPL-MCNC: 15 MG/DL (ref 5–25)
CREAT SERPL-MCNC: 0.9 MG/DL (ref 0.6–1.3)
GFR SERPL CREATININE-BSD FRML MDRD: 73 ML/MIN/1.73SQ M

## 2019-07-22 PROCEDURE — 82565 ASSAY OF CREATININE: CPT

## 2019-07-22 PROCEDURE — 36415 COLL VENOUS BLD VENIPUNCTURE: CPT

## 2019-07-22 PROCEDURE — 84520 ASSAY OF UREA NITROGEN: CPT

## 2019-07-25 ENCOUNTER — HOSPITAL ENCOUNTER (OUTPATIENT)
Dept: CT IMAGING | Facility: HOSPITAL | Age: 54
Discharge: HOME | End: 2019-07-25
Attending: OTOLARYNGOLOGY
Payer: COMMERCIAL

## 2019-07-25 DIAGNOSIS — K11.20 SIALOADENITIS OF SUBMANDIBULAR GLAND: ICD-10-CM

## 2019-07-25 PROCEDURE — 70491 CT SOFT TISSUE NECK W/DYE: CPT

## 2019-07-25 RX ADMIN — IOHEXOL 85 ML: 350 INJECTION, SOLUTION INTRAVENOUS at 22:10

## 2019-11-20 ENCOUNTER — EVALUATION (OUTPATIENT)
Dept: PHYSICAL THERAPY | Facility: CLINIC | Age: 54
End: 2019-11-20
Payer: COMMERCIAL

## 2019-11-20 DIAGNOSIS — Z96.652 S/P TOTAL KNEE REPLACEMENT, LEFT: Primary | ICD-10-CM

## 2019-11-20 PROCEDURE — 97162 PT EVAL MOD COMPLEX 30 MIN: CPT | Performed by: PHYSICAL THERAPIST

## 2019-11-20 NOTE — LETTER
2019    Malia Hdz MD  [address] [facility]  [address]    Patient: Angeline Lopez   YOB: 1965   Date of Visit: 2019     Encounter Diagnosis     ICD-10-CM    1  S/P total knee replacement, left U52 724        Dear Dr Carin Bray: Thank you for your recent referral of Angeline Lopez  Please review the attached evaluation summary from Chance's recent visit  Please verify that you agree with the plan of care by signing the attached order  If you have any questions or concerns, please do not hesitate to call  I sincerely appreciate the opportunity to share in the care of one of your patients and hope to have another opportunity to work with you in the near future  Sincerely,    Doreene Councilman, PT      Referring Provider:      I certify that I have read the below Plan of Care and certify the need for these services furnished under this plan of treatment while under my care  Malia Hdz MD  [address] [facility]  [id number] [place] [address] [address]: [phone]          PT Evaluation     Today's date: 2019  Patient name: Angeline Lopez  : 1965  MRN: [id number]  Referring provider: Alex Dias MD  Dx:   Encounter Diagnosis     ICD-10-CM    1  S/P total knee replacement, left Z96 652        Start Time: 1404  Stop Time: 1445  Total time in clinic (min): 41 minutes    Assessment  Assessment details: Angeline Lopez is a 47 y o  female who presents to the clinic s/p L TKA on 19  The patient presents with the above listed impairments  She is currently limited with left knee flexion and extension and also demonstrates an antalgic gait pattern  She is eager to increase her functional mobility and should benefit from skilled physical therapy    Thank you for the referral       Impairments: abnormal gait, abnormal muscle firing, abnormal or restricted ROM, activity intolerance, impaired balance, impaired physical strength, lacks appropriate home exercise program and pain with function  Understanding of Dx/Px/POC: good   Prognosis: good    Goals  Impairment Goals:  1  Patient will decrease complaints of pain by 50% at worst in 6 weeks  2  Patient will increase L knee ROM 0-120 degrees in 6 weeks  3  Patient will increase knee strength to at least 4+/5 in 6 weeks    Functional Goals:  1  Patient will be independent with HEP by discharge  2  Patient will increase FOTO to average norms by discharge  3  Patient will decrease antalgic gait by 50% in 6 weeks  4  Patient will ascend/descend stairs with a reciprocal gait pattern in 6 weeks  5  Patient will be able to ambulate for over 20 minutes with decreased pain in 6 weeks      Plan  Patient would benefit from: skilled physical therapy  Planned modality interventions: cryotherapy, TENS and thermotherapy: hydrocollator packs  Planned therapy interventions: balance, flexibility, functional ROM exercises, gait training, graded activity, graded exercise, home exercise program, therapeutic exercise, therapeutic activities, stretching, strengthening, patient education, neuromuscular re-education, muscle pump exercises, motor coordination training, manual therapy, joint mobilization, massage and Magallanes taping  Frequency: 2x week  Duration in weeks: 6  Treatment plan discussed with: patient        Subjective Evaluation    History of Present Illness  Mechanism of injury: HPI:  Patient reports 2 years ago feeling knee pain after running  She noted that the pain "never went away" and it continued to get worse  The patient reported that she had injections and tried physical therapy prior without any improvement  She underwent a left TKA 11/12/19  She now presents for her physical therapy evaluation  Pain Location:  Left anterior knee  Occupation:  Bookeeper  Prior Functional Limitations:   Independent prior to to knee issues 2 years ago   AGG:  In/Out of Tub, Lifting legs into bed, ascending/descending stairs  Ease:  Ice  Patient Goals:  "I want to get 100% movement back"    Pain  Current pain ratin  At best pain ratin  At worst pain rating: 10  Quality: dull ache and sharp          Objective     Active Range of Motion   Left Knee   Flexion: 80 degrees with pain  Extension: -2 degrees     Right Knee   Flexion: 126 degrees   Extension: 0 degrees     Passive Range of Motion   Left Knee   Flexion: 82 degrees with pain  Extension: 0 degrees     Strength/Myotome Testing     Left Knee   Flexion: 3-  Extension: 3-    Additional Strength Details  Unable to complete SLR, needs assist to get leg on table     Ambulation     Comments   Ambulating with a SPC, step to gait pattern    Decreased knee flexion and stance time on the L LE              Diagnosis:    Precautions:    Manuals        PROM        Mobs                        Exercise Diary        Bike        Quad Sets        SLR        SL Hip ABD        Heel Slides        Hamstring S        TKEs                                                                                                                Modalities             CP PRN

## 2019-11-20 NOTE — PROGRESS NOTES
PT Evaluation     Today's date: 2019  Patient name: Lorna Johnson  : 1965  MRN: [id number]  Referring provider: Francine Sandhu MD  Dx:   Encounter Diagnosis     ICD-10-CM    1  S/P total knee replacement, left Z96 652        Start Time: 1404  Stop Time: 1445  Total time in clinic (min): 41 minutes    Assessment  Assessment details: Lorna Johnson is a 47 y o  female who presents to the clinic s/p L TKA on 19  The patient presents with the above listed impairments  She is currently limited with left knee flexion and extension and also demonstrates an antalgic gait pattern  She is eager to increase her functional mobility and should benefit from skilled physical therapy  Thank you for the referral       Impairments: abnormal gait, abnormal muscle firing, abnormal or restricted ROM, activity intolerance, impaired balance, impaired physical strength, lacks appropriate home exercise program and pain with function  Understanding of Dx/Px/POC: good   Prognosis: good    Goals  Impairment Goals:  1  Patient will decrease complaints of pain by 50% at worst in 6 weeks  2  Patient will increase L knee ROM 0-120 degrees in 6 weeks  3  Patient will increase knee strength to at least 4+/5 in 6 weeks    Functional Goals:  1  Patient will be independent with HEP by discharge  2  Patient will increase FOTO to average norms by discharge  3  Patient will decrease antalgic gait by 50% in 6 weeks  4  Patient will ascend/descend stairs with a reciprocal gait pattern in 6 weeks  5    Patient will be able to ambulate for over 20 minutes with decreased pain in 6 weeks      Plan  Patient would benefit from: skilled physical therapy  Planned modality interventions: cryotherapy, TENS and thermotherapy: hydrocollator packs  Planned therapy interventions: balance, flexibility, functional ROM exercises, gait training, graded activity, graded exercise, home exercise program, therapeutic exercise, therapeutic activities, stretching, strengthening, patient education, neuromuscular re-education, muscle pump exercises, motor coordination training, manual therapy, joint mobilization, massage and Magallanes taping  Frequency: 2x week  Duration in weeks: 6  Treatment plan discussed with: patient        Subjective Evaluation    History of Present Illness  Mechanism of injury: HPI:  Patient reports 2 years ago feeling knee pain after running  She noted that the pain "never went away" and it continued to get worse  The patient reported that she had injections and tried physical therapy prior without any improvement  She underwent a left TKA 19  She now presents for her physical therapy evaluation  Pain Location:  Left anterior knee  Occupation:  Bookeeper  Prior Functional Limitations: Independent prior to to knee issues 2 years ago   AGG:  In/Out of Tub, Lifting legs into bed, ascending/descending stairs  Ease:  Ice  Patient Goals:  "I want to get 100% movement back"    Pain  Current pain ratin  At best pain ratin  At worst pain rating: 10  Quality: dull ache and sharp          Objective     Active Range of Motion   Left Knee   Flexion: 80 degrees with pain  Extension: -2 degrees     Right Knee   Flexion: 126 degrees   Extension: 0 degrees     Passive Range of Motion   Left Knee   Flexion: 82 degrees with pain  Extension: 0 degrees     Strength/Myotome Testing     Left Knee   Flexion: 3-  Extension: 3-    Additional Strength Details  Unable to complete SLR, needs assist to get leg on table     Ambulation     Comments   Ambulating with a SPC, step to gait pattern    Decreased knee flexion and stance time on the L LE              Diagnosis:    Precautions:    Manuals        PROM        Mobs                        Exercise Diary        Bike        Quad Sets        SLR        SL Hip ABD        Heel Slides        Hamstring S        TKEs                                                                                                                Modalities             CP PRN

## 2019-11-21 ENCOUNTER — TRANSCRIBE ORDERS (OUTPATIENT)
Dept: PHYSICAL THERAPY | Facility: CLINIC | Age: 54
End: 2019-11-21

## 2019-11-21 DIAGNOSIS — Z96.652 S/P TOTAL KNEE REPLACEMENT, LEFT: Primary | ICD-10-CM

## 2019-11-22 ENCOUNTER — OFFICE VISIT (OUTPATIENT)
Dept: PHYSICAL THERAPY | Facility: CLINIC | Age: 54
End: 2019-11-22
Payer: COMMERCIAL

## 2019-11-22 DIAGNOSIS — Z96.652 S/P TOTAL KNEE REPLACEMENT, LEFT: Primary | ICD-10-CM

## 2019-11-22 PROCEDURE — 97140 MANUAL THERAPY 1/> REGIONS: CPT | Performed by: PHYSICAL THERAPIST

## 2019-11-22 PROCEDURE — 97112 NEUROMUSCULAR REEDUCATION: CPT | Performed by: PHYSICAL THERAPIST

## 2019-11-22 PROCEDURE — 97110 THERAPEUTIC EXERCISES: CPT | Performed by: PHYSICAL THERAPIST

## 2019-11-22 NOTE — PROGRESS NOTES
Daily Note     Today's date: 2019  Patient name: Tiffanie Mccoy  : 1965  MRN: [id number]  Referring provider: Eden Arias MD  Dx:   Encounter Diagnosis     ICD-10-CM    1  S/P total knee replacement, left Z96 652        Start Time:   Stop Time: 922  Total time in clinic (min): 50 minutes    Subjective: "I've been doing too much at work  I know I've been walking a lot"      Objective: See treatment diary below      Assessment: Tolerated treatment well  Patient would benefit from continued PT  Patient still limited with knee flexion ROM, but able to see a 15 degree PROM increase from her initial evaluation  Still unable to complete a full SLR without assistance  Progress ROM as able  Plan: Progress treatment as tolerated         Diagnosis: L TKA   Precautions:    Manuals        PROM Knee Flexion & Extension        Mobs                        Exercise Diary        Bike 5 mins       Quad Sets 3" x 20       SLR AAROM 10x       SL Hip ABD Supine 20x       Heel Slides x10       Hamstring S        TKEs                                                                                                                Modalities             CP PRN 10'

## 2019-11-26 ENCOUNTER — OFFICE VISIT (OUTPATIENT)
Dept: PHYSICAL THERAPY | Facility: CLINIC | Age: 54
End: 2019-11-26
Payer: COMMERCIAL

## 2019-11-26 DIAGNOSIS — Z96.652 S/P TOTAL KNEE REPLACEMENT, LEFT: Primary | ICD-10-CM

## 2019-11-26 PROCEDURE — 97112 NEUROMUSCULAR REEDUCATION: CPT

## 2019-11-26 PROCEDURE — 97110 THERAPEUTIC EXERCISES: CPT

## 2019-11-26 PROCEDURE — 97140 MANUAL THERAPY 1/> REGIONS: CPT

## 2019-11-26 NOTE — PROGRESS NOTES
Daily Note     Today's date: 2019  Patient name: Lorna Johnson  : 1965  MRN: [id number]  Referring provider: Francine Sandhu MD  Dx:   Encounter Diagnosis     ICD-10-CM    1  S/P total knee replacement, left Z96 652                   Subjective: Patient reports, "I have a little clicking in the knee  I don't know if that is normal"  Objective: See treatment diary below      Assessment: Tolerated treatment well  Patient demonstrated fatigue post treatment and would benefit from continued PT Patient was able to perform full revolutions on the bike and was excited about this accomplishment  She was also able to perform SLR without assistance, but demonstrates slight quad lag  Patient achieved 112 deg of knee flexion passively, but still has pain at end range  Patient needed encouragement throughout the session to perform tasks  Progress ROM  Plan: Continue per plan of care        Diagnosis: L TKA   Precautions:    Manuals        PROM Knee Flexion & Extension  Knee Flexion & Extension       Mobs                        Exercise Diary        Bike 5 mins 6 mins full revolutions (seat 10)       Quad Sets 3" x 20 3" x 20       SLR AAROM 10x 12x       SL Hip ABD Supine 20x       Heel Slides x10 x10       Hamstring S        TKEs   with pink ball: 15x       Calf stretch   2x 30 sec                                                                            PROM    Flexion: 112 deg                        Modalities             CP PRN 10' 10'

## 2019-11-29 ENCOUNTER — OFFICE VISIT (OUTPATIENT)
Dept: PHYSICAL THERAPY | Facility: CLINIC | Age: 54
End: 2019-11-29
Payer: COMMERCIAL

## 2019-11-29 DIAGNOSIS — Z96.652 S/P TOTAL KNEE REPLACEMENT, LEFT: Primary | ICD-10-CM

## 2019-11-29 PROCEDURE — 97140 MANUAL THERAPY 1/> REGIONS: CPT

## 2019-11-29 PROCEDURE — 97110 THERAPEUTIC EXERCISES: CPT

## 2019-11-29 PROCEDURE — 97112 NEUROMUSCULAR REEDUCATION: CPT

## 2019-11-29 NOTE — PROGRESS NOTES
Daily Note     Today's date: 2019  Patient name: Selena Corbett  : 1965  MRN: [id number]  Referring provider: Lance Dai MD  Dx:   Encounter Diagnosis     ICD-10-CM    1  S/P total knee replacement, left Z96 652                   Subjective: Patient reports, "My knee feels clicky, and my right knee hurts a little bit  It really hurts at night when I am trying to sleep "       Objective: See treatment diary below      Assessment: Tolerated treatment well  Patient exhibited good technique with therapeutic exercises and would benefit from continued PT  Patient was able to achieve 0-120 deg of knee range of motion  She demonstrates improved neuromuscular control, mainly with SLR  She as able to progress strengthening tasks this session  Patient needed occasional encouragement throughout the session to perform tasks  Will continue to progress PRE"s as able  Plan: Continue per plan of care        Diagnosis: L TKA   Precautions:    Manuals      PROM Knee Flexion & Extension  Knee Flexion & Extension  Knee Flexion     Mobs                        Exercise Diary        Bike 5 mins 6 mins full revolutions (seat 10)  5 mins full revolutions (seat 10)      Quad Sets 3" x 20 3" x 20  3" x 20      SLR AAROM 10x 12x  10x      SL Hip ABD Supine 20x  15x      Heel Slides x10 x10  x10      Hamstring S        TKEs   with pink ball: 15x       Calf stretch   2x 30 sec       Clamshells    Red, 2x10     Step downs   2 in, 10x     Step ups    6 in, 10x      Leg Press (seat 6)   55#, 20x   75#, 15x      Squats    10x                                   PROM    Flexion: 112 deg   0-120 deg                      Modalities             CP PRN 10' 10'  defer

## 2019-12-02 ENCOUNTER — OFFICE VISIT (OUTPATIENT)
Dept: PHYSICAL THERAPY | Facility: CLINIC | Age: 54
End: 2019-12-02
Payer: COMMERCIAL

## 2019-12-02 DIAGNOSIS — Z96.652 S/P TOTAL KNEE REPLACEMENT, LEFT: Primary | ICD-10-CM

## 2019-12-02 PROCEDURE — 97110 THERAPEUTIC EXERCISES: CPT

## 2019-12-02 PROCEDURE — 97140 MANUAL THERAPY 1/> REGIONS: CPT

## 2019-12-02 PROCEDURE — 97112 NEUROMUSCULAR REEDUCATION: CPT

## 2019-12-02 NOTE — PROGRESS NOTES
Daily Note     Today's date: 2019  Patient name: Kandace Ruiz  : 1965  MRN: [id number]  Referring provider: Alondra Petersen MD  Dx:   Encounter Diagnosis     ICD-10-CM    1  S/P total knee replacement, left Z96 652                   Subjective: Patient reports, "I am feeling very well  I am massaging the incision, and I still have the clicking sensation  I still take an Oxy at night because it still feels tight"  She reports that she still has pain when descending stairs  Objective: See treatment diary below      Assessment: Tolerated treatment well  Patient exhibited good technique with therapeutic exercises and would benefit from continued PT  Patient demonstrates significant improvement with her PROM  She had greater neuromuscular control with SLR and step up and overs  She complained of "clicking and feeling loose" but believes this may be from muscular fatigue  Attempted lunges, but had medial knee pain  She is progressing nicely towards long term goals  Plan: Continue per plan of care        Diagnosis: L TKA   Precautions:    Manuals      PROM Knee Flexion & Extension  Knee Flexion & Extension  Knee Flexion Knee flexion     Mobs                        Exercise Diary        Bike 5 mins 6 mins full revolutions (seat 10)  5 mins full revolutions (seat 10)  5 mins full revolutions (seat 10)     Quad Sets 3" x 20 3" x 20  3" x 20  3" x 20     SLR AAROM 10x 12x  10x  15x     SL Hip ABD Supine 20x  15x      Heel Slides x10 x10  x10  x15     Hamstring S        TKEs   with pink ball: 15x       Calf stretch   2x 30 sec       Clamshells    Red, 2x10     Step downs   2 in, 10x     Step ups    6 in, 10x  + Overs: 4 in & 6 in, 8x ea    Leg Press (seat 6)   55#, 20x   75#, 15x  60#, 2x10     Squats    10x       Bridges        OSB: 2x10       Lunges        attempted: 1x ea      PROM    Flexion: 112 deg   0-120 deg   AAROM: 0-120 deg                    Modalities             CP PRN 10' 10'  defer

## 2019-12-04 ENCOUNTER — OFFICE VISIT (OUTPATIENT)
Dept: PHYSICAL THERAPY | Facility: CLINIC | Age: 54
End: 2019-12-04
Payer: COMMERCIAL

## 2019-12-04 DIAGNOSIS — Z96.652 S/P TOTAL KNEE REPLACEMENT, LEFT: Primary | ICD-10-CM

## 2019-12-04 PROCEDURE — 97140 MANUAL THERAPY 1/> REGIONS: CPT

## 2019-12-04 PROCEDURE — 97112 NEUROMUSCULAR REEDUCATION: CPT

## 2019-12-04 PROCEDURE — 97110 THERAPEUTIC EXERCISES: CPT

## 2019-12-04 NOTE — PROGRESS NOTES
Daily Note     Today's date: 2019  Patient name: Aminta Holloway  : 1965  MRN: [id number]  Referring provider: Sekou Soto MD  Dx:   Encounter Diagnosis     ICD-10-CM    1  S/P total knee replacement, left [id number]                   Subjective:  Patient reports, "It feels tight today  But, something gets better everyday  I really should try to do more at home"  Objective: See treatment diary below      Assessment: Tolerated treatment well  Patient exhibited good technique with therapeutic exercises and would benefit from continued PT  Full program not completed this session secondary to time limitations  Abolished medial knee pain with STM  Patient demonstrates improved strength, but continues to have difficulty with neuromuscular control with step downs  She is progressing well towards goals  Plan: Continue per plan of care        Diagnosis: L TKA   Precautions:    Manuals FOTO NV      PROM  Knee Flexion & Extension  Knee Flexion Knee flexion     Mobs        STM      Medial Knee           Exercise Diary        Bike  6 mins full revolutions (seat 10)  5 mins full revolutions (seat 10)  5 mins full revolutions (seat 10)  5 mins full revolutions (seat 10)    Quad Sets  3" x 20  3" x 20  3" x 20     SLR  12x  10x  15x     SL Hip ABD   15x      Heel Slides  x10  x10  x15  x10    Hamstring S        TKEs   with pink ball: 15x       Calf stretch   2x 30 sec       Clamshells    Red, 2x10     Step downs   2 in, 10x     Step ups    6 in, 10x  + Overs: 4 in & 6 in, 8x ea    Leg Press (seat 6)   55#, 20x   75#, 15x  60#, 2x10  70#, 2x10    Squats    10x       Bridges       OSB: 2x10       Lunges       attempted: 1x ea      PROM   Flexion: 112 deg   0-120 deg   AAROM: 0-120 deg   AAROM: flex: 120 deg                Modalities            CP PRN  10'  defer

## 2019-12-09 ENCOUNTER — OFFICE VISIT (OUTPATIENT)
Dept: PHYSICAL THERAPY | Facility: CLINIC | Age: 54
End: 2019-12-09
Payer: COMMERCIAL

## 2019-12-09 DIAGNOSIS — Z96.652 S/P TOTAL KNEE REPLACEMENT, LEFT: Primary | ICD-10-CM

## 2019-12-09 PROCEDURE — 97110 THERAPEUTIC EXERCISES: CPT

## 2019-12-09 PROCEDURE — 97140 MANUAL THERAPY 1/> REGIONS: CPT

## 2019-12-09 PROCEDURE — 97112 NEUROMUSCULAR REEDUCATION: CPT

## 2019-12-09 NOTE — PROGRESS NOTES
Daily Note     Today's date: 2019  Patient name: Evy Gutierrez  : 1965  MRN: [id number]  Referring provider: Harish Torrez MD  Dx:   Encounter Diagnosis     ICD-10-CM    1  S/P total knee replacement, left Q64 128                   Subjective: Patient reports, "It feels really clicky today  Everyday the knee gets better"  Objective: See treatment diary below      Assessment: Tolerated treatment well  Patient exhibited good technique with therapeutic exercises and would benefit from continued PT  Continued with outlined program without increased pain  Her biggest complaint was clicking at the medial knee during the session, but she did not have pain with this  She is progressing nicely towards goals  Plan: Continue per plan of care        Diagnosis: L TKA   Precautions:    Manuals     PROM Gastroc, hamstring, adductor, IT band   Knee Flexion Knee flexion     Mobs        STM      Medial Knee           Exercise Diary        Bike 5 mins full revolutions (seat 10)   5 mins full revolutions (seat 10)  5 mins full revolutions (seat 10)  5 mins full revolutions (seat 10)    Quad Sets   3" x 20  3" x 20     SLR 15x   10x  15x     SL Hip ABD   15x      Heel Slides x10   x10  x15  x10    Hamstring S        TKEs        Stool scoots  2 laps        Clamshells    Red, 2x10     Step downs   2 in, 10x     Step ups  + Overs: 4 in x 6   6 in, 10x ea  6 in, 10x  + Overs: 4 in & 6 in, 8x ea    Leg Press (seat 6) 60#, 25x   55#, 20x   75#, 15x  60#, 2x10  70#, 2x10    Squats    10x       Bridges  OSB: 2x10     OSB: 2x10       Lunges      attempted: 1x ea      PROM AAROM: flex: 124 deg   0-120 deg   AAROM: 0-120 deg   AAROM: flex: 120 deg               Modalities           CP PRN   defer

## 2019-12-11 ENCOUNTER — OFFICE VISIT (OUTPATIENT)
Dept: PHYSICAL THERAPY | Facility: CLINIC | Age: 54
End: 2019-12-11
Payer: COMMERCIAL

## 2019-12-11 DIAGNOSIS — Z96.652 S/P TOTAL KNEE REPLACEMENT, LEFT: Primary | ICD-10-CM

## 2019-12-11 PROCEDURE — 97140 MANUAL THERAPY 1/> REGIONS: CPT

## 2019-12-11 PROCEDURE — 97112 NEUROMUSCULAR REEDUCATION: CPT

## 2019-12-11 PROCEDURE — 97110 THERAPEUTIC EXERCISES: CPT

## 2019-12-11 NOTE — PROGRESS NOTES
Daily Note     Today's date: 2019  Patient name: Racheal Begum  : 1965  MRN: [id number]  Referring provider: Ho Huffman MD  Dx:   Encounter Diagnosis     ICD-10-CM    1  S/P total knee replacement, left Z96 652                   Subjective: Patient reports, "I feel like I am plateau-ing  I haven't really noticed a difference"  Objective: See treatment diary below      Assessment: Tolerated treatment well  Patient would benefit from continued PT  Continued outlined program with introduction of new PRE's  Patient is better able to ascend stairs, but is still limited with eccentric descent  Patient was significantly challenged with tap downs on 2 in step  Performed squats on biodex to help give patient feedback with equal weight bearing with squatting  She demonstrates improved control and strength, but needs cuing and encouragement throughout the session to perform exercises  Progress strength as able  Plan: Continue per plan of care        Diagnosis: L TKA   Precautions:    Manuals     PROM Gastroc, hamstring, adductor, IT band    Knee flexion     Mobs        STM   Medial Knee   Medial Knee           Exercise Diary        Bike 5 mins full revolutions (seat 10)  5 mins full revolutions (seat 10)  5 mins full revolutions (seat 10)  5 mins full revolutions (seat 10)    Quad Sets    3" x 20     SLR 15x  1#, 2x10   15x     SL Hip ABD        Heel Slides x10  x10   x15  x10    Hamstring S        TKEs  10#, 15x       Stool scoots  2 laps  2 laps       Clamshells         Step downs        Step ups  + Overs: 4 in x 6   6 in, 10x ea Tap downs: 2in, 5x   + Overs: 4 in & 6 in, 8x ea    Leg Press (seat 6) 60#, 25x  SL: 25#, 20x   60#, 2x10  70#, 2x10    Squats   On Biodex with weight shifting: 10x        Bridges  OSB: 2x10    OSB: 2x10       Lunges     attempted: 1x ea                             Taping   Pain star- medial knee       PROM AAROM: flex: 124 deg AAROM: flex: 122 deg  AAROM: 0-120 deg   AAROM: flex: 120 deg              Modalities          CP PRN

## 2019-12-16 ENCOUNTER — OFFICE VISIT (OUTPATIENT)
Dept: PHYSICAL THERAPY | Facility: CLINIC | Age: 54
End: 2019-12-16
Payer: COMMERCIAL

## 2019-12-16 DIAGNOSIS — Z96.652 S/P TOTAL KNEE REPLACEMENT, LEFT: Primary | ICD-10-CM

## 2019-12-16 PROCEDURE — 97140 MANUAL THERAPY 1/> REGIONS: CPT

## 2019-12-16 PROCEDURE — 97112 NEUROMUSCULAR REEDUCATION: CPT

## 2019-12-16 PROCEDURE — 97110 THERAPEUTIC EXERCISES: CPT

## 2019-12-16 NOTE — PROGRESS NOTES
Daily Note     Today's date: 2019  Patient name: Raffy Dodd  : 1965  MRN: [id number]  Referring provider: Janine Meigs, MD  Dx:   Encounter Diagnosis     ICD-10-CM    1  S/P total knee replacement, left [id number]                   Subjective: Patient reports, 'I just feel so discouraged  I thought I would be farther along by now  It still hurts on the inside of my knee and I am so stiff  I know there is a lot going on, and I just feel so discouraged  I still have the clunking in my knee too"  Objective: See treatment diary below      Assessment: Tolerated treatment well  Patient would benefit from continued PT  Heavy emphasis on manual therapy this session due to increased discomfort  She was able to report improved stiffness following manuals  She continues to note medial knee pain with ambulation and prolonged sitting  Patient needed encouragement throughout session  Patient has follow up with her surgeon on Wednesday, and is to be re-evaluated at next session  Progress patient as able  Plan: Continue per plan of care        Diagnosis: L TKA   Precautions:    Manuals     PROM Gastroc, hamstring, adductor, IT band   Hamstring      Mobs        STM   Medial Knee IASTM distal hamstring   Medial Knee           Exercise Diary        Bike 5 mins full revolutions (seat 10)  5 mins full revolutions (seat 10) 5 mins full revolutions (seat 10)  5 mins full revolutions (seat 10)    Quad Sets        SLR 15x  1#, 2x10       SL Hip ABD        Heel Slides x10  x10    x10    Hamstring S        TKEs  10#, 15x       Stool scoots  2 laps  2 laps       X-walks    Red, 1 lap at blue line     Step downs        Step ups  + Overs: 4 in x 6   6 in, 10x ea Tap downs: 2in, 5x       Leg Press (seat 6) 60#, 25x  SL: 25#, 20x  60#, 2x10  SL: 30#, 10x   70#, 2x10    Squats   On Biodex with weight shifting: 10x        Bridges  OSB: 2x10          Lunges                                 Taping Pain star- medial knee       PROM AAROM: flex: 124 deg AAROM: flex: 122 deg    AAROM: flex: 120 deg             Modalities          CP PRN

## 2019-12-19 ENCOUNTER — APPOINTMENT (OUTPATIENT)
Dept: PHYSICAL THERAPY | Facility: CLINIC | Age: 54
End: 2019-12-19
Payer: COMMERCIAL

## 2019-12-23 ENCOUNTER — APPOINTMENT (OUTPATIENT)
Dept: PHYSICAL THERAPY | Facility: CLINIC | Age: 54
End: 2019-12-23
Payer: COMMERCIAL

## 2019-12-26 ENCOUNTER — OFFICE VISIT (OUTPATIENT)
Dept: PHYSICAL THERAPY | Facility: CLINIC | Age: 54
End: 2019-12-26
Payer: COMMERCIAL

## 2020-01-09 NOTE — PROGRESS NOTES
PT Discharge    Today's date: 2020  Patient name: Paz Gryason  : 1965  MRN: [id number]  Referring provider: Neelima Baumann MD  Dx:   Encounter Diagnosis     ICD-10-CM    1  S/P total knee replacement, left Y85 821      Patient called and reported that her surgeon noted that she did not need to continue with any further physical therapy sessions  Patient was contacted and reported that she was happy with her progress and would like to be discharged  Due to this, the patient will be discharged at this time        Assessment/Plan    Subjective    Objective    Flowsheet Rows      Most Recent Value   PT/OT G-Codes   Current Score  55   Projected Score  61

## 2020-05-29 ENCOUNTER — OFFICE VISIT (OUTPATIENT)
Dept: OBGYN CLINIC | Facility: CLINIC | Age: 55
End: 2020-05-29
Payer: COMMERCIAL

## 2020-05-29 VITALS
WEIGHT: 165 LBS | DIASTOLIC BLOOD PRESSURE: 74 MMHG | TEMPERATURE: 97.3 F | BODY MASS INDEX: 28.17 KG/M2 | HEIGHT: 64 IN | SYSTOLIC BLOOD PRESSURE: 110 MMHG

## 2020-05-29 DIAGNOSIS — Z01.419 WELL WOMAN EXAM WITH ROUTINE GYNECOLOGICAL EXAM: Primary | ICD-10-CM

## 2020-05-29 DIAGNOSIS — Z12.39 SCREENING FOR MALIGNANT NEOPLASM OF BREAST: ICD-10-CM

## 2020-05-29 PROCEDURE — S0610 ANNUAL GYNECOLOGICAL EXAMINA: HCPCS | Performed by: OBSTETRICS & GYNECOLOGY

## 2020-05-29 PROCEDURE — 87624 HPV HI-RISK TYP POOLED RSLT: CPT | Performed by: OBSTETRICS & GYNECOLOGY

## 2020-05-29 PROCEDURE — G0145 SCR C/V CYTO,THINLAYER,RESCR: HCPCS | Performed by: OBSTETRICS & GYNECOLOGY

## 2020-05-29 RX ORDER — MULTIVITAMIN
1 TABLET ORAL DAILY
COMMUNITY

## 2020-05-29 RX ORDER — LISINOPRIL 2.5 MG/1
2.5 TABLET ORAL DAILY
COMMUNITY

## 2020-06-01 LAB
HPV HR 12 DNA CVX QL NAA+PROBE: POSITIVE
HPV16 DNA CVX QL NAA+PROBE: NEGATIVE
HPV18 DNA CVX QL NAA+PROBE: NEGATIVE

## 2020-06-05 LAB
LAB AP GYN PRIMARY INTERPRETATION: NORMAL
Lab: NORMAL

## 2020-06-19 PROBLEM — R10.32 LEFT LOWER QUADRANT ABDOMINAL PAIN: Status: ACTIVE | Noted: 2020-06-19

## 2020-06-26 ENCOUNTER — APPOINTMENT (OUTPATIENT)
Dept: LAB | Facility: CLINIC | Age: 55
End: 2020-06-26
Payer: COMMERCIAL

## 2020-06-26 DIAGNOSIS — R10.32 LEFT LOWER QUADRANT ABDOMINAL PAIN: ICD-10-CM

## 2020-06-26 LAB
ANION GAP SERPL CALCULATED.3IONS-SCNC: 6 MMOL/L (ref 4–13)
BUN SERPL-MCNC: 15 MG/DL (ref 5–25)
CALCIUM SERPL-MCNC: 9.3 MG/DL (ref 8.3–10.1)
CHLORIDE SERPL-SCNC: 105 MMOL/L (ref 100–108)
CO2 SERPL-SCNC: 29 MMOL/L (ref 21–32)
CREAT SERPL-MCNC: 0.98 MG/DL (ref 0.6–1.3)
GFR SERPL CREATININE-BSD FRML MDRD: 66 ML/MIN/1.73SQ M
GLUCOSE P FAST SERPL-MCNC: 95 MG/DL (ref 65–99)
POTASSIUM SERPL-SCNC: 4.4 MMOL/L (ref 3.5–5.3)
SODIUM SERPL-SCNC: 140 MMOL/L (ref 136–145)

## 2020-06-26 PROCEDURE — 80048 BASIC METABOLIC PNL TOTAL CA: CPT

## 2020-06-26 PROCEDURE — 36415 COLL VENOUS BLD VENIPUNCTURE: CPT

## 2020-07-06 ENCOUNTER — HOSPITAL ENCOUNTER (OUTPATIENT)
Dept: CT IMAGING | Facility: HOSPITAL | Age: 55
Discharge: HOME | End: 2020-07-06
Attending: COLON & RECTAL SURGERY
Payer: COMMERCIAL

## 2020-07-06 DIAGNOSIS — R10.32 LEFT LOWER QUADRANT ABDOMINAL PAIN: ICD-10-CM

## 2020-07-06 PROCEDURE — 74177 CT ABD & PELVIS W/CONTRAST: CPT

## 2020-07-06 RX ADMIN — IOHEXOL 100 ML: 350 INJECTION, SOLUTION INTRAVENOUS at 20:08

## 2020-08-04 ENCOUNTER — OFFICE VISIT (OUTPATIENT)
Dept: PHYSICAL THERAPY | Facility: CLINIC | Age: 55
End: 2020-08-04
Payer: COMMERCIAL

## 2020-08-04 DIAGNOSIS — G89.29 CHRONIC PAIN OF BOTH SHOULDERS: Primary | ICD-10-CM

## 2020-08-04 DIAGNOSIS — M25.511 CHRONIC PAIN OF BOTH SHOULDERS: Primary | ICD-10-CM

## 2020-08-04 DIAGNOSIS — M25.512 CHRONIC PAIN OF BOTH SHOULDERS: Primary | ICD-10-CM

## 2020-08-04 DIAGNOSIS — G89.29 CHRONIC LEFT HIP PAIN: ICD-10-CM

## 2020-08-04 DIAGNOSIS — M25.552 CHRONIC LEFT HIP PAIN: ICD-10-CM

## 2020-08-04 PROCEDURE — 97162 PT EVAL MOD COMPLEX 30 MIN: CPT | Performed by: PHYSICAL THERAPIST

## 2020-08-04 NOTE — PROGRESS NOTES
PT Evaluation     Today's date: 2020  Patient name: Sharon Reagan  : 1965  MRN: [id number]  Referring provider: Zakia Nair PT  Dx:   Encounter Diagnosis     ICD-10-CM    1  Chronic pain of both shoulders  M25 511     G89 29     M25 512    2  Chronic left hip pain  M25 552     G89 29        Start Time: 0747  Stop Time: 0825  Total time in clinic (min): 38 minutes    Assessment  Assessment details: Sharon Reagan is a 47 y o  female who presents to the clinic with complaints of chronic bilateral shoulder pain and chronic left hip pain  The patient's shoulder pain has been ongoing for two years while her pain pain has been ongoing for 3-4 months  The patient demonstrates full but painful ROM as well as complaints of increased fatigue in her left shoulder  She demonstrates weakness in her scapular musculature which can be contributing to her symptoms  The patient is tender to palpation to her left greater trochanter and demonstrates painful resisted hip abduction  She is eager to increase her functional performance and should benefit from skilled physical therapy  The patient is being seen through direct access and can be treated for up to 30 days without a referral       Impairments: abnormal muscle firing, abnormal or restricted ROM, activity intolerance, impaired physical strength, lacks appropriate home exercise program and pain with function  Barriers to therapy: Financials may limit the patient's ability to regularly attend physical therapy sessions  Understanding of Dx/Px/POC: good   Prognosis: good    Goals  Impairment Goals:  1  Patient will decrease complaints of pain by 25% at worst in 4 weeks  2  Patient will increase shoulder strength globally to 4+/5 in 4 weeks  3  Patient will increase hip strength to at least 4+/5 in 4 weeks    Functional Goals:  1  Patient will be independent with HEP by discharge  2  Patient will increase FOTO to average norms by discharge  3    Patient will be able to close a drawer with her left arm with decreased pain in 4 weeks  4  Patient will be able to reach behind her back with decreased pain in 4 weeks  5  Patient will be able to complete a lower body routine with decreased pain in 4 weeks      Plan  Patient would benefit from: skilled physical therapy  Planned modality interventions: cryotherapy, TENS and thermotherapy: hydrocollator packs  Planned therapy interventions: flexibility, functional ROM exercises, graded activity, graded exercise, home exercise program, therapeutic exercise, therapeutic activities, stretching, strengthening, patient education, joint mobilization, manual therapy, massage, muscle pump exercises and neuromuscular re-education  Frequency: 2x week  Duration in weeks: 4  Treatment plan discussed with: patient        Subjective Evaluation    History of Present Illness  Mechanism of injury: HPI:  Patient reports 2 years ago doing exercise classes where she was feeling pain in her shoulders after doing shoulder presses  The patient went to [facility] for MRIs, injections, and some physical therapy  The patient feels that her shoulder feels fatigued all of the time  She also notes left hip pain spanning 3 months after doing her pilates machine  Pain Location:  Left lateral hip and anterior shoulder  Occupation:  Prior Functional Limitations: Independent prior   AGG:  Push-ups, dips, reaching behind, prolonged activity with the hip  Ease:  Rest  Patient Goals:    Pain  Current pain ratin (3/10 L hip)  At best pain ratin (2/10 L hip)  At worst pain ratin (5/10 L hip)  Quality: sharp and knife-like          Objective     Tenderness     Left Hip   Tenderness in the greater trochanter       Active Range of Motion   Left Shoulder   Flexion: 160 degrees with pain  Abduction: 175 degrees   External rotation BTH: C6   Internal rotation BTB: mid thoracic with pain    Right Shoulder   Flexion: 161 degrees with pain  Abduction: 170 degrees   External rotation BTH: C7   Internal rotation BTB: mid thoracic   Left Hip   Flexion: [facility]  Internal rotation (90/90): [facility]    Strength/Myotome Testing     Left Shoulder     Planes of Motion   Flexion: 4+   Abduction: 4   External rotation at 0°: 4-   Internal rotation at 0°: 4     Isolated Muscles   Lower trapezius: 4-   Middle trapezius: 4   Rhomboids: 4-     Right Shoulder     Planes of Motion   Flexion: 4+   Abduction: 4   External rotation at 0°: 4   Internal rotation at 0°: 4     Isolated Muscles   Lower trapezius: 4-   Middle trapezius: 4   Rhomboids: 4-     Left Hip   Planes of Motion   Flexion: 5  Extension: 4-  Abduction: 4-    Additional Strength Details  Pain with resisted abduction and extension     Tests     Left Hip   Positive Kim  Negative GAYLESTEVE and hiram                 Diagnosis:    Precautions:    Manuals        PROM        Mobs        IASTM                        There Ex        UBE        IT Band Stretch                                        Neruo Re-Ed        Tband ER/IR        Tband Rows        Tband Ext        SL ER        Scap Stab w/ Tband        Wall Climbs                        Clamshells        SL Hip ABD        X-Walks        SL Bridge                       Ther Act                                         Modalities             CP PRN

## 2020-08-13 ENCOUNTER — OFFICE VISIT (OUTPATIENT)
Dept: PHYSICAL THERAPY | Facility: CLINIC | Age: 55
End: 2020-08-13
Payer: COMMERCIAL

## 2020-08-13 DIAGNOSIS — M25.511 CHRONIC PAIN OF BOTH SHOULDERS: Primary | ICD-10-CM

## 2020-08-13 DIAGNOSIS — M25.512 CHRONIC PAIN OF BOTH SHOULDERS: Primary | ICD-10-CM

## 2020-08-13 DIAGNOSIS — G89.29 CHRONIC LEFT HIP PAIN: ICD-10-CM

## 2020-08-13 DIAGNOSIS — M25.552 CHRONIC LEFT HIP PAIN: ICD-10-CM

## 2020-08-13 DIAGNOSIS — G89.29 CHRONIC PAIN OF BOTH SHOULDERS: Primary | ICD-10-CM

## 2020-08-13 PROCEDURE — 97112 NEUROMUSCULAR REEDUCATION: CPT | Performed by: PHYSICAL THERAPIST

## 2020-08-13 PROCEDURE — 97140 MANUAL THERAPY 1/> REGIONS: CPT | Performed by: PHYSICAL THERAPIST

## 2020-08-13 NOTE — PROGRESS NOTES
Daily Note     Today's date: 2020  Patient name: Sabino Ambrosio  : 1965  MRN: [id number]  Referring provider: Parth Amezquita, PT  Dx:   Encounter Diagnosis     ICD-10-CM    1  Chronic pain of both shoulders  M25 511     G89 29     M25 512    2  Chronic left hip pain  M25 552     G89 29        Start Time: 1457  Stop Time: 1535  Total time in clinic (min): 38 minutes    Subjective: "I'm actually feeling pretty good  I can still feel it in my hip and shoulders though"      Objective: See treatment diary below      Assessment: Tolerated treatment well  Patient would benefit from continued PT  Focused on the patient's hip today primarily  Able to note fatigue at the end of the session  Primarily having pain in the lateral hip  Will focus on shoulder at next visit  Plan: Progress treatment as tolerated         Diagnosis:    Precautions:    Manuals        PROM IT Band & Piriformis        Mobs        IASTM                        There Ex        UBE        IT Band Stretch                                        Neruo Re-Ed        Tband ER/IR        Tband Rows        Tband Ext        SL ER        Scap Stab w/ Tband        Wall Climbs                        Clamshells Blue 2x10       SL Hip ABD        X-Walks Red 1 lap       SL Bridge x10       Leg Press  50# 2x10       Bosu Lunge x10       Lateral Tap Down 4" 2x10                              Ther Act                                         Modalities             CP PRN

## 2020-08-14 ENCOUNTER — OFFICE VISIT (OUTPATIENT)
Dept: PHYSICAL THERAPY | Facility: CLINIC | Age: 55
End: 2020-08-14
Payer: COMMERCIAL

## 2020-08-14 DIAGNOSIS — G89.29 CHRONIC PAIN OF BOTH SHOULDERS: Primary | ICD-10-CM

## 2020-08-14 DIAGNOSIS — M25.511 CHRONIC PAIN OF BOTH SHOULDERS: Primary | ICD-10-CM

## 2020-08-14 DIAGNOSIS — G89.29 CHRONIC LEFT HIP PAIN: ICD-10-CM

## 2020-08-14 DIAGNOSIS — M25.512 CHRONIC PAIN OF BOTH SHOULDERS: Primary | ICD-10-CM

## 2020-08-14 DIAGNOSIS — M25.552 CHRONIC LEFT HIP PAIN: ICD-10-CM

## 2020-08-14 PROCEDURE — 97110 THERAPEUTIC EXERCISES: CPT | Performed by: PHYSICAL THERAPIST

## 2020-08-14 PROCEDURE — 97112 NEUROMUSCULAR REEDUCATION: CPT | Performed by: PHYSICAL THERAPIST

## 2020-08-14 NOTE — PROGRESS NOTES
Daily Note     Today's date: 2020  Patient name: Casimiro Baer  : 1965  MRN: [id number]  Referring provider: Calderon Johnson, PT  Dx:   Encounter Diagnosis     ICD-10-CM    1  Chronic pain of both shoulders  M25 511     G89 29     M25 512    2  Chronic left hip pain  M25 552     G89 29        Start Time:   Stop Time: 08  Total time in clinic (min): 39 minutes    Subjective: "I know it felt weird when I tried to close a desk drawer  It just really fatigues fast"      Objective: See treatment diary below      Assessment: Tolerated treatment well  Patient would benefit from continued PT  First day today focusing on the shoulder  Patient did fatigue quickly  Needed rest breaks to complete her session  Continue to focus on strengthening and stabilization of the shoulder and hip  Plan: Progress treatment as tolerated         Diagnosis:    Precautions:    Manuals       PROM IT Band & Piriformis        Mobs        IASTM                        There Ex        UBE  /      IT Band Stretch        Doorway Stretch  10" x 4      Sleeper Stretch  10" x 4                      Neruo Re-Ed        Tband ER/IR  [place] 2 5# 2x10 ea      Tband Rows  [place] 16 0 2x10      Tband Ext  Kailee 14 0 2x10      SL ER        Scap Stab w/ Tband  Red 2x2      Wall Climbs  Red 2x2      Supine Punch  3# 3x10                              Clamshells Blue 2x10       SL Hip ABD        X-Walks Red 1 lap       SL Bridge x10       Leg Press  50# 2x10       Bosu Lunge x10       Lateral Tap Down 4" 2x10                              Ther Act                                         Modalities             CP PRN

## 2020-08-17 ENCOUNTER — OFFICE VISIT (OUTPATIENT)
Dept: PHYSICAL THERAPY | Facility: CLINIC | Age: 55
End: 2020-08-17
Payer: COMMERCIAL

## 2020-08-17 DIAGNOSIS — M25.552 CHRONIC LEFT HIP PAIN: Primary | ICD-10-CM

## 2020-08-17 DIAGNOSIS — G89.29 CHRONIC PAIN OF BOTH SHOULDERS: ICD-10-CM

## 2020-08-17 DIAGNOSIS — M25.512 CHRONIC PAIN OF BOTH SHOULDERS: ICD-10-CM

## 2020-08-17 DIAGNOSIS — M25.511 CHRONIC PAIN OF BOTH SHOULDERS: ICD-10-CM

## 2020-08-17 DIAGNOSIS — G89.29 CHRONIC LEFT HIP PAIN: Primary | ICD-10-CM

## 2020-08-17 PROCEDURE — 97140 MANUAL THERAPY 1/> REGIONS: CPT | Performed by: PHYSICAL THERAPIST

## 2020-08-17 PROCEDURE — 97110 THERAPEUTIC EXERCISES: CPT | Performed by: PHYSICAL THERAPIST

## 2020-08-17 PROCEDURE — 97112 NEUROMUSCULAR REEDUCATION: CPT | Performed by: PHYSICAL THERAPIST

## 2020-08-17 NOTE — PROGRESS NOTES
Daily Note     Today's date: 2020  Patient name: Raza Gastelum  : 1965  MRN: [id number]  Referring provider: [place] L[place], PT  Dx:   Encounter Diagnosis     ICD-10-CM    1  Chronic left hip pain  M25 552     G89 29    2  Chronic pain of both shoulders  M25 511     G89 29     M25 512        Start Time: 1445  Stop Time: 1528  Total time in clinic (min): 43 minutes    Subjective: "It's OK  I had a little pain when I was sleeping"      Objective: See treatment diary below      Assessment: Tolerated treatment well  Patient would benefit from continued PT  Able to focus today on both shoulders and her left hip  Patient is able to attain muscular fatigue fairly easily  Hip pain seems to be primarily lateral hip  Continue to focus on scapular strengthening of both shoulders as well as lateral hip strength  Plan: Progress treatment as tolerated         Diagnosis:    Precautions:    Manuals      PROM IT Band & Piriformis   IT Band     Mobs        IASTM                        There Ex        UBE       IT Band Stretch   10" x 2     Doorway Stretch  10" x 4      Sleeper Stretch  10" x 4                      Neruo Re-Ed        Tband ER/IR  Kailee 2 5# 2x10 ea [place] 2 5 2x10 ea     Tband Rows  [place] 16 0 2x10 [place] 14 0 2x10     Tband Ext  Kailee 14 0 2x10 Kailee 14 0 3x10     SL ER        Scap Stab w/ Tband  Red 2x2      Wall Climbs  Red 2x2      Supine Punch  3# 3x10                              Clamshells Blue 2x10  Blue 3x10     SL Hip ABD   2# 2x10     X-Walks Red 1 lap       SL Bridge x10  2x10     Leg Press  50# 2x10  65# 2x10  SL 40#     Bosu Lunge x10       Lateral Tap Down 4" 2x10                              Ther Act                                         Modalities             CP PRN

## 2020-08-19 ENCOUNTER — OFFICE VISIT (OUTPATIENT)
Dept: PHYSICAL THERAPY | Facility: CLINIC | Age: 55
End: 2020-08-19
Payer: COMMERCIAL

## 2020-08-19 DIAGNOSIS — G89.29 CHRONIC LEFT HIP PAIN: ICD-10-CM

## 2020-08-19 DIAGNOSIS — G89.29 CHRONIC PAIN OF BOTH SHOULDERS: Primary | ICD-10-CM

## 2020-08-19 DIAGNOSIS — M25.512 CHRONIC PAIN OF BOTH SHOULDERS: Primary | ICD-10-CM

## 2020-08-19 DIAGNOSIS — M25.552 CHRONIC LEFT HIP PAIN: ICD-10-CM

## 2020-08-19 DIAGNOSIS — M25.511 CHRONIC PAIN OF BOTH SHOULDERS: Primary | ICD-10-CM

## 2020-08-19 PROCEDURE — 97110 THERAPEUTIC EXERCISES: CPT | Performed by: PHYSICAL THERAPIST

## 2020-08-19 PROCEDURE — 97112 NEUROMUSCULAR REEDUCATION: CPT | Performed by: PHYSICAL THERAPIST

## 2020-08-19 NOTE — PROGRESS NOTES
Daily Note     Today's date: 2020  Patient name: Janel Toribio  : 1965  MRN: [id number]  Referring provider: Bibiana Welsh, PT  Dx:   Encounter Diagnosis     ICD-10-CM    1  Chronic pain of both shoulders  M25 511     G89 29     M25 512    2  Chronic left hip pain  M25 552     G89 29        Start Time: 1359  Stop Time: 1435  Total time in clinic (min): 36 minutes    Subjective: "I was really hurting after last time"       Objective: See treatment diary below      Assessment: Tolerated treatment well  Patient would benefit from continued PT  Patient noted increased pain today in her hips as she jumped in her yard and noted pain in her calf  Hip work held today due to her pain  Focused on bilateral shoulders and again patient was able to report muscular fatigue easily  Continue to focus on her strength and endurance as able  Plan: Progress treatment as tolerated         Diagnosis:    Precautions:    Manuals      PROM IT Band & Piriformis   IT Band     Mobs        IASTM                        There Ex        UBE      IT Band Stretch   10" x 2     Doorway Stretch  10" x 4      Sleeper Stretch  10" x 4              Review of HEP    RT, PT    Neruo Re-Ed        Tband ER/IR  Kailee 2 5# 2x10 ea [place] 2 5 2x10 ea     Tband Rows  [place] 16 0 2x10 [place] 14 0 2x10 Kailee 15 0 2x10    Tband Ext  [place] 14 0 2x10 Kailee 14 0 3x10 Kailee 15 0 3x10    SL ER    3# 2x10 ea    Scap Stab w/ Tband  Red 2x2      Wall Climbs  Red 2x2  Red 1x5 & 1x4    Supine Punch  3# 3x10      Prone Rows    3# x10 ea    Bent Over Horiz Abd    2# x10 ea    Bent Over Lower Trap    2# x10 ea                                    Clamshells Blue 2x10  Blue 3x10     SL Hip ABD   2# 2x10     X-Walks Red 1 lap       SL Bridge x10  2x10     Leg Press  50# 2x10  65# 2x10  SL 40#     Bosu Lunge x10       Lateral Tap Down 4" 2x10                              Ther Act                                         Modalities CP PRN

## 2020-08-24 ENCOUNTER — OFFICE VISIT (OUTPATIENT)
Dept: PHYSICAL THERAPY | Facility: CLINIC | Age: 55
End: 2020-08-24
Payer: COMMERCIAL

## 2020-08-24 DIAGNOSIS — G89.29 CHRONIC PAIN OF BOTH SHOULDERS: Primary | ICD-10-CM

## 2020-08-24 DIAGNOSIS — M25.552 CHRONIC LEFT HIP PAIN: ICD-10-CM

## 2020-08-24 DIAGNOSIS — M25.511 CHRONIC PAIN OF BOTH SHOULDERS: Primary | ICD-10-CM

## 2020-08-24 DIAGNOSIS — M25.512 CHRONIC PAIN OF BOTH SHOULDERS: Primary | ICD-10-CM

## 2020-08-24 DIAGNOSIS — G89.29 CHRONIC LEFT HIP PAIN: ICD-10-CM

## 2020-08-24 PROCEDURE — 97110 THERAPEUTIC EXERCISES: CPT | Performed by: PHYSICAL THERAPIST

## 2020-08-24 PROCEDURE — 97112 NEUROMUSCULAR REEDUCATION: CPT | Performed by: PHYSICAL THERAPIST

## 2020-08-24 NOTE — PROGRESS NOTES
Daily Note     Today's date: 2020  Patient name: Sharon Reagan  : 1965  MRN: [id number]  Referring provider: Zakia Nair, PT  Dx:   Encounter Diagnosis     ICD-10-CM    1  Chronic pain of both shoulders  M25 511     G89 29     M25 512    2  Chronic left hip pain  M25 552     G89 29        Start Time: 1400  Stop Time: 1442  Total time in clinic (min): 42 minutes    Subjective: "It's my hip that's the worst today, but other that I'm feeling pretty good"       Objective: See treatment diary below      Assessment: Tolerated treatment well  Patient would benefit from continued PT  Patient notes that she has been feeling "good"  Instructed on newer shoulder and hip exercises which she was able to complete without any pain  Patient still with slight difficulties with muscular fatigue  Progress as able  Plan: Progress treatment as tolerated         Diagnosis:    Precautions:    Manuals    PROM IT Band & Piriformis   IT Band     Mobs        IASTM                        There Ex        UBE      IT Band Stretch   10" x 2     Doorway Stretch  10" x 4      Sleeper Stretch  10" x 4      Piriformis Stretch     10" x 5           Review of HEP    RT, PT    Neruo Re-Ed        Tband ER/IR  [place] 2 5# 2x10 ea [place] 2 5 2x10 ea     Tband Rows  [place] 16 0 2x10 [place] 14 0 2x10 [place] 15 0 2x10 [place] 15 0 2x10   Tband Ext  [place] 14 0 2x10 [place]ilee 14 0 3x10 [place]ilee 15 0 3x10 [place] 12 0 2x10   SL ER    3# 2x10 ea    Scap Stab w/ Tband  Red 2x2   Green Ball x20 ea   Wall Climbs  Red 2x2  Red 1x5 & 1x4    Supine Punch  3# 3x10      Prone Rows    3# x10 ea    Bent Over Horiz Abd    2# x10 ea    Bent Over Lower Trap    2# x10 ea    Body Blade     20" x 2 ea                           Clamshells Blue 2x10  Blue 3x10     SL Hip ABD   2# 2x10     X-Walks Red 1 lap       SL Bridge x10  2x10     Leg Press  50# 2x10  65# 2x10  SL 40#     Bosu Lunge x10    Lunge x10   Lateral Tap Down 4" 2x10       Excursions      x10 fwd / x10 lat   Standing Hip ABD / EXT     Red x10 ea                          Ther Act                                         Modalities             CP PRN

## 2020-08-26 ENCOUNTER — OFFICE VISIT (OUTPATIENT)
Dept: PHYSICAL THERAPY | Facility: CLINIC | Age: 55
End: 2020-08-26
Payer: COMMERCIAL

## 2020-08-26 DIAGNOSIS — M25.511 CHRONIC PAIN OF BOTH SHOULDERS: Primary | ICD-10-CM

## 2020-08-26 DIAGNOSIS — G89.29 CHRONIC PAIN OF BOTH SHOULDERS: Primary | ICD-10-CM

## 2020-08-26 DIAGNOSIS — M25.552 CHRONIC LEFT HIP PAIN: ICD-10-CM

## 2020-08-26 DIAGNOSIS — M25.512 CHRONIC PAIN OF BOTH SHOULDERS: Primary | ICD-10-CM

## 2020-08-26 DIAGNOSIS — G89.29 CHRONIC LEFT HIP PAIN: ICD-10-CM

## 2020-08-26 PROCEDURE — 97110 THERAPEUTIC EXERCISES: CPT | Performed by: PHYSICAL THERAPIST

## 2020-08-26 PROCEDURE — 97112 NEUROMUSCULAR REEDUCATION: CPT | Performed by: PHYSICAL THERAPIST

## 2020-08-26 NOTE — PROGRESS NOTES
Daily Note     Today's date: 2020  Patient name: Casimiro Baer  : 1965  MRN: [id number]  Referring provider: Calderon Johnson, PT  Dx:   Encounter Diagnosis     ICD-10-CM    1  Chronic pain of both shoulders  M25 511     G89 29     M25 512    2  Chronic left hip pain  M25 552     G89 29        Start Time: 1400  Stop Time: 1442  Total time in clinic (min): 42 minutes    Subjective: "My hip is bothering me still but I just want to rest it  My shoulders feel good"      Objective: See treatment diary below      Assessment: Tolerated treatment well  Patient would benefit from continued PT  Patient wanted to hold on all hip exercises as she wanted to rest that body part  She reports that her shoulders have been feeling "great"  Patient able to tolerate all her shoulder exercises without any complaints of pain, just muscular fatigue  Possible discharge at next visit  Plan: Potential discharge next visit  Progress treatment as tolerated         Diagnosis:    Precautions:    Manuals    PROM   IT Band     Mobs        IASTM                        There Ex        UBE     IT Band Stretch   10" x 2     Doorway Stretch        Sleeper Stretch        Piriformis Stretch     10" x 5           Review of HEP    RT, PT    Neruo Re-Ed        Tband ER/IR [place] 2 5# 2x10  [place] 2 5 2x10 ea     Tband Rows [place] 15 0 2x10  [place] 14 0 2x10 [place] 15 0 2x10 [place] 15 0 2x10   Tband Ext   [place] 14 0 3x10 [place] 15 0 3x10 [place] 12 0 2x10   SL ER    3# 2x10 ea    Scap Stab w/ Tband Red 1x5    Green Ball x20 ea   Wall Climbs Red 2x5   Red 1x5 & 1x4    Supine Punch        Prone Rows Bent over 3# 2x10 ea   3# x10 ea    Bent Over Horiz Abd 2# x10 ea   2# x10 ea    Bent Over Lower Trap 2# x10 ea   2# x10 ea    Body Blade     20" x 2 ea   [place] and Arrows Red x10 ea       90/90 Green 20" x 2 ea               Clamshells   Blue 3x10     SL Hip ABD   2# 2x10     X-Walks        SL Bridge   2x10     Leg Press    65# 2x10  SL 40#     Bosu     Lunge x10   Lateral Tap Down        Excursions      x10 fwd / x10 lat   Standing Hip ABD / EXT     Red x10 ea                          Ther Act                                         Modalities             CP PRN

## 2020-08-31 ENCOUNTER — OFFICE VISIT (OUTPATIENT)
Dept: PHYSICAL THERAPY | Facility: CLINIC | Age: 55
End: 2020-08-31
Payer: COMMERCIAL

## 2020-08-31 DIAGNOSIS — G89.29 CHRONIC PAIN OF BOTH SHOULDERS: Primary | ICD-10-CM

## 2020-08-31 DIAGNOSIS — G89.29 CHRONIC LEFT HIP PAIN: ICD-10-CM

## 2020-08-31 DIAGNOSIS — M25.512 CHRONIC PAIN OF BOTH SHOULDERS: Primary | ICD-10-CM

## 2020-08-31 DIAGNOSIS — M25.552 CHRONIC LEFT HIP PAIN: ICD-10-CM

## 2020-08-31 DIAGNOSIS — M25.511 CHRONIC PAIN OF BOTH SHOULDERS: Primary | ICD-10-CM

## 2020-08-31 PROCEDURE — 97112 NEUROMUSCULAR REEDUCATION: CPT | Performed by: PHYSICAL THERAPIST

## 2020-08-31 PROCEDURE — 97110 THERAPEUTIC EXERCISES: CPT | Performed by: PHYSICAL THERAPIST

## 2020-08-31 NOTE — PROGRESS NOTES
Daily Note     Today's date: 2020  Patient name: Rito Gu  : 1965  MRN: [id number]  Referring provider: Tameka Marquez, PT  Dx:   Encounter Diagnosis     ICD-10-CM    1  Chronic pain of both shoulders  M25 511     G89 29     M25 512    2  Chronic left hip pain  M25 552     G89 29        Start Time: 1404  Stop Time: 1440  Total time in clinic (min): 36 minutes    Subjective: "My shoulders feel good  My hip is still there  I know I probably just pulled something bad"      Objective: See treatment diary below      Assessment: Tolerated treatment well  Patient would benefit from continued PT  Patient was given an updated HEP today  Able to note no pain in her shoulders today  Patient also able to complete x-walks and leg press without complaints of pain in her hip  Will follow-up with patient on her progress with transition to full HEP  Plan: Progress treatment as tolerated         Diagnosis:    Precautions:    Manuals    PROM   IT Band     Mobs        IASTM                        There Ex        UBE     IT Band Stretch   10" x 2     Doorway Stretch        Sleeper Stretch        Piriformis Stretch     10" x 5   Patient Education   RT, PT      Review of HEP  RT, PT - extensive review  RT, PT    Neruo Re-Ed        Tband ER/IR [place] 2 5# 2x10  [place] 2 5 2x10 ea     Tband Rows [place] 15 0 2x10  [place] 14 0 2x10 [place] 15 0 2x10 [place] 15 0 2x10   Tband Ext   [place] 14 0 3x10 [place] 15 0 3x10 [place] 12 0 2x10   SL ER    3# 2x10 ea    Scap Stab w/ Tband Red 1x5    Green Ball x20 ea   Wall Climbs Red 2x5   Red 1x5 & 1x4    Supine Punch        Prone Rows Bent over 3# 2x10 ea   3# x10 ea    Bent Over Horiz Abd 2# x10 ea   2# x10 ea    Bent Over Lower Trap 2# x10 ea   2# x10 ea    Body Blade     20" x 2 ea   [place] and Arrows Red x10 ea       90/90 Green 20" x 2 ea               Clamshells   Blue 3x10     SL Hip ABD   2# 2x10     X-Walks  Green 2 laps      [employer] 2x10     Leg Press   65# x10  75# 2x10 65# 2x10  SL 40#     Bosu     Lunge x10   Lateral Tap Down        Excursions      x10 fwd / x10 lat   Standing Hip ABD / EXT     Red x10 ea                          Ther Act                                         Modalities             CP PRN

## 2020-09-09 NOTE — PROGRESS NOTES
PT Discharge    Today's date: 2020  Patient name: Janel Toribio  : 1965  MRN: [id number]  Referring provider: Bibiana Welsh, PT  Dx:   Encounter Diagnosis     ICD-10-CM    1  Chronic pain of both shoulders  M25 511     G89 29     M25 512    2  Chronic left hip pain  M25 552     G89 29      Patient called and reported that she was still feeling "good"  Reported that she was able to continue to follow her HEP  She was happy with her progress and reported that she would continue to follow her HEP  If Татьяна Clairivana would need physical therapy in the future, we would be happy to share in her care

## 2021-06-04 ENCOUNTER — ANNUAL EXAM (OUTPATIENT)
Dept: OBGYN CLINIC | Facility: CLINIC | Age: 56
End: 2021-06-04
Payer: COMMERCIAL

## 2021-06-04 VITALS
BODY MASS INDEX: 29.52 KG/M2 | DIASTOLIC BLOOD PRESSURE: 72 MMHG | HEIGHT: 63 IN | WEIGHT: 166.6 LBS | SYSTOLIC BLOOD PRESSURE: 118 MMHG

## 2021-06-04 DIAGNOSIS — Z01.419 ENCOUNTER FOR WELL WOMAN EXAM WITH ROUTINE GYNECOLOGICAL EXAM: Primary | ICD-10-CM

## 2021-06-04 DIAGNOSIS — Z12.4 SCREENING FOR MALIGNANT NEOPLASM OF CERVIX: ICD-10-CM

## 2021-06-04 DIAGNOSIS — Z12.31 ENCOUNTER FOR SCREENING MAMMOGRAM FOR MALIGNANT NEOPLASM OF BREAST: ICD-10-CM

## 2021-06-04 DIAGNOSIS — Z11.51 SCREENING FOR HPV (HUMAN PAPILLOMAVIRUS): ICD-10-CM

## 2021-06-04 PROCEDURE — G0145 SCR C/V CYTO,THINLAYER,RESCR: HCPCS | Performed by: OBSTETRICS & GYNECOLOGY

## 2021-06-04 PROCEDURE — 87624 HPV HI-RISK TYP POOLED RSLT: CPT | Performed by: OBSTETRICS & GYNECOLOGY

## 2021-06-04 PROCEDURE — S0612 ANNUAL GYNECOLOGICAL EXAMINA: HCPCS | Performed by: OBSTETRICS & GYNECOLOGY

## 2021-06-04 NOTE — PROGRESS NOTES
ASSESSMENT & PLAN: Fermin Sousa is a 54 y o  [id number] with normal gynecologic exam     1   Routine well woman exam done today  2   Pap and HPV:Pap with HPV was done today  Current ASCCP Guidelines reviewed  3   Mammogram ordered  Recommend yearly mammography  4   Colonoscopy recommended  5  The patient is sexually active  6  The following were reviewed in today's visit: mammography screening ordered, STD testing, HIV risk factors and prevention, menopause, exercise and healthy diet  7  Patient to return to office in 12 months for annual gyn exam      All questions have been answered to her satisfaction  CC:  Annual Gynecologic Examination    HPI: Fermin Sousa is a 54 y o  [id number] who presents for annual gynecologic examination  She has the following concerns:    - needs repeat pap smear  Concerned for abnormal results as she will lose her health insurance after her divorce if final    - reports random R sided pelvic pain and has concerns that her stitches surgery in 2018 is coming undone or adhesive disease is causing the pain  As her divorce finalizes, Delano Toledo will be pursuing online dating  Discussed condom/barrier protection use to prevent STDs as well as coconut oil and silicone lubricants to help with vaginal dryness  She reports minimal urinary leakage with sneezing, coughing, and laughing  She is concerned for loss of bladder control during orgasm  Health Maintenance:    She exercises 3 days per week  She wears her seatbelt routinely  She does perform regular monthly self breast exams  She feels safe at home  Patients does follow a balanced diet      Last mammogram: 2019  Last colonoscopy: 2018       Past Medical History:   Diagnosis Date    Abnormal Pap smear of cervix 2020    Diverticulitis     Endometriosis     Hypertension        Past Surgical History:   Procedure Laterality Date    BOWEL RESECTION      2018    BREAST IMPLANT Bilateral      SECTION       SECTION      COLONOSCOPY      x4    EYE SURGERY      biklat eyelid surgery    FRACTURE SURGERY      right arm     HEMICOLOECTOMY W/ ANASTOMOSIS N/A 2018    Procedure: LAPAROSCOPIC SIGMOID RESECTION, MOBILIZATION OF splenic flexure; Surgeon: Vel Wiseman MD;  Location: [facility];  Service: Colorectal    HYSTERECTOMY      LAPAROSCOPIC LYSIS INTESTINAL ADHESIONS      WA SIGMOIDOSCOPY FLX DX W/COLLJ SPEC BR/WA IF PFRMD N/A 2018    Procedure: Fernandez Dials;  Surgeon: Vel Wiseman MD;  Location: [facility];  Service: Colorectal       Past OB/Gyn History:  Period Cycle (Days): (Hysterectomy)No LMP recorded  Patient has had a hysterectomy  Menstrual History:  OB History        3    Para   2    Term   1       1    AB   1    Living   3       SAB   1    TAB        Ectopic        Multiple   1    Live Births   3           Obstetric Comments   Menarche: 15            Menarche age: 15  No LMP recorded  Patient has had a hysterectomy  Period Cycle (Days): (Hysterectomy)    Menstrual history: Patient is post menopausal    History of sexually transmitted infection: No  Patient is currently sexually active  Birth control: postmenopausal  Last Pap 2020 :  normal pap, other hr HPV pos      Family History   Problem Relation Age of Onset    Hypertension Mother     Hypertension Father     Breast cancer Maternal Grandmother     Breast cancer Maternal Aunt         double mastectomy       Social History:  Social History     Socioeconomic History    Marital status: /Civil Union     Spouse name: Not on file    Number of children: Not on file    Years of education: Not on file    Highest education level: Not on file   Occupational History    Not on file   Social Needs    Financial resource strain: Not on file    Food insecurity     Worry: Not on file     Inability: Not on file    Transportation needs     Medical: Not on file     Non-medical: Not on file Tobacco Use    Smoking status: Never Smoker    Smokeless tobacco: Never Used   Substance and Sexual Activity    Alcohol use: Yes     Frequency: Monthly or less     Comment: rarely    Drug use: No    Sexual activity: Not Currently     Partners: Male     Birth control/protection: Female Sterilization   Lifestyle    Physical activity     Days per week: Not on file     Minutes per session: Not on file    Stress: Not on file   Relationships    Social connections     Talks on phone: Not on file     Gets together: Not on file     Attends Latter-day service: Not on file     Active member of club or organization: Not on file     Attends meetings of clubs or organizations: Not on file     Relationship status: Not on file    Intimate partner violence     Fear of current or ex partner: Not on file     Emotionally abused: Not on file     Physically abused: Not on file     Forced sexual activity: Not on file   Other Topics Concern    Not on file   Social History Narrative    Not on file     Presently lives with self  Patient is  and soon to be   Patient is currently employed  No Known Allergies      Current Outpatient Medications:     CALCIUM-VITAMIN D PO, Take 1 tablet by mouth daily, Disp: , Rfl:     lisinopril (ZESTRIL) 2 5 mg tablet, Take 2 5 mg by mouth daily, Disp: , Rfl:     Multiple Vitamin (MULTIVITAMIN) tablet, Take 1 tablet by mouth daily, Disp: , Rfl:     TURMERIC CURCUMIN PO, Take by mouth, Disp: , Rfl:     Review of Systems:  Review of Systems   Constitutional: Negative for activity change, appetite change and unexpected weight change  Respiratory: Negative for cough and shortness of breath  Cardiovascular: Negative for chest pain  Gastrointestinal: Negative for abdominal pain, constipation, diarrhea, nausea, rectal pain (concern for hemorrhoid) and vomiting  Genitourinary: Positive for pelvic pain (R inguinal pain)   Negative for difficulty urinating, dyspareunia, frequency, menstrual problem, urgency, vaginal bleeding, vaginal discharge and vaginal pain  Musculoskeletal: Negative for back pain  Skin: Negative  Neurological: Negative for dizziness, weakness, light-headedness and headaches  Psychiatric/Behavioral: Negative  Physical Exam:  /72 (BP Location: Left arm, Patient Position: Sitting, Cuff Size: Standard)   Ht 5' 3" (1 6 m)   Wt 75 6 kg (166 lb 9 6 oz)   BMI 29 51 kg/m²    Physical Exam  Constitutional:       General: She is not in acute distress  Appearance: Normal appearance  She is well-developed  She is not diaphoretic  Genitourinary:      Pelvic exam was performed with patient in the lithotomy position  Vulva, urethra, bladder and vagina normal       No vulval lesion, tenderness, ulcerations or rash noted  No posterior fourchette tenderness or injury present  No inguinal adenopathy present in the right or left side  No urethral prolapse or mass present  Bladder is not tender  No lesions in the vagina  Vaginal rugosity present  No vaginal discharge, erythema, tenderness or bleeding  Cervical pinkness present  No cervical motion tenderness, discharge, friability, lesion or polyp  No right or left adnexal mass present  Right adnexa absent  Right adnexa not tender or full  Left adnexa absent  Left adnexa not tender or full  Genitourinary Comments: Urethra midline, no masses  Urethral meatus normal in appearance  Bladder nontender to palpation  Perineum normal in appearance, no lacerations, no ulcerations, no lesions visualized  Rectum:      External hemorrhoid present  No rectal mass, tenderness, internal hemorrhoid or abnormal anal tone  HENT:      Head: Normocephalic and atraumatic  Cardiovascular:      Rate and Rhythm: Normal rate and regular rhythm  Heart sounds: Normal heart sounds  No murmur  No friction rub     Pulmonary:      Effort: Pulmonary effort is normal  No respiratory distress  Breath sounds: Normal breath sounds  No wheezing or rales  Chest:      Breasts: Breasts are symmetrical          Right: Normal  No swelling, bleeding, mass, skin change or tenderness  Left: Normal  No swelling, bleeding, mass, skin change or tenderness  Comments: Bilateral breast implants in place  Abdominal:      Palpations: Abdomen is soft  There is no mass  Tenderness: There is no abdominal tenderness  There is no guarding  Musculoskeletal: Normal range of motion  General: No tenderness  Lymphadenopathy:      Lower Body: No right inguinal adenopathy  No left inguinal adenopathy  Neurological:      Mental Status: She is alert and oriented to person, place, and time  Skin:     General: Skin is warm and dry  Coloration: Skin is not pale  Findings: No erythema  Psychiatric:         Mood and Affect: Mood normal          Behavior: Behavior normal          Thought Content: Thought content normal          Judgment: Judgment normal    Vitals signs and nursing note reviewed

## 2021-06-08 LAB
HPV HR 12 DNA CVX QL NAA+PROBE: NEGATIVE
HPV16 DNA CVX QL NAA+PROBE: NEGATIVE
HPV18 DNA CVX QL NAA+PROBE: NEGATIVE

## 2021-06-09 LAB
LAB AP GYN PRIMARY INTERPRETATION: NORMAL
Lab: NORMAL

## 2022-02-25 NOTE — ED NOTES
Patient transported to [place], RN  06/07/18 8445
Pt returned from [address] [place] [address]  06/07/18 6811
Reviewed discharge instructions and medication education information, duration, and precautions  Patient verbalized understanding and offered no further questions  Ambulated off unit in stable condition in no acute distress        Pilar Gutierrez RN  06/07/18 8019
PAST SURGICAL HISTORY:  H/O shoulder surgery

## 2023-12-23 ENCOUNTER — APPOINTMENT (EMERGENCY)
Dept: CT IMAGING | Facility: HOSPITAL | Age: 58
End: 2023-12-23
Payer: COMMERCIAL

## 2023-12-23 ENCOUNTER — HOSPITAL ENCOUNTER (EMERGENCY)
Facility: HOSPITAL | Age: 58
Discharge: HOME | End: 2023-12-23
Attending: INTERNAL MEDICINE
Payer: COMMERCIAL

## 2023-12-23 VITALS
OXYGEN SATURATION: 99 % | HEART RATE: 85 BPM | RESPIRATION RATE: 18 BRPM | SYSTOLIC BLOOD PRESSURE: 139 MMHG | DIASTOLIC BLOOD PRESSURE: 94 MMHG | TEMPERATURE: 97.6 F

## 2023-12-23 DIAGNOSIS — N20.1 URETEROLITHIASIS: ICD-10-CM

## 2023-12-23 DIAGNOSIS — N23 URETERAL COLIC: Primary | ICD-10-CM

## 2023-12-23 LAB
ALBUMIN SERPL BCP-MCNC: 4.5 G/DL (ref 3.5–5)
ALP SERPL-CCNC: 76 U/L (ref 34–104)
ALT SERPL W P-5'-P-CCNC: 12 U/L (ref 7–52)
ANION GAP SERPL CALCULATED.3IONS-SCNC: 9 MMOL/L
AST SERPL W P-5'-P-CCNC: 12 U/L (ref 13–39)
BACTERIA UR QL AUTO: NORMAL /HPF
BASOPHILS # BLD AUTO: 0.04 THOUSANDS/ÂΜL (ref 0–0.1)
BASOPHILS NFR BLD AUTO: 1 % (ref 0–1)
BILIRUB SERPL-MCNC: 0.44 MG/DL (ref 0.2–1)
BILIRUB UR QL STRIP: NEGATIVE
BUN SERPL-MCNC: 21 MG/DL (ref 5–25)
CALCIUM SERPL-MCNC: 10.1 MG/DL (ref 8.4–10.2)
CHLORIDE SERPL-SCNC: 102 MMOL/L (ref 96–108)
CLARITY UR: CLEAR
CO2 SERPL-SCNC: 28 MMOL/L (ref 21–32)
COLOR UR: YELLOW
CREAT SERPL-MCNC: 1.12 MG/DL (ref 0.6–1.3)
EOSINOPHIL # BLD AUTO: 0.41 THOUSAND/ÂΜL (ref 0–0.61)
EOSINOPHIL NFR BLD AUTO: 7 % (ref 0–6)
ERYTHROCYTE [DISTWIDTH] IN BLOOD BY AUTOMATED COUNT: 12.3 % (ref 11.6–15.1)
GFR SERPL CREATININE-BSD FRML MDRD: 54 ML/MIN/1.73SQ M
GLUCOSE SERPL-MCNC: 113 MG/DL (ref 65–140)
GLUCOSE UR STRIP-MCNC: NEGATIVE MG/DL
HCT VFR BLD AUTO: 40 % (ref 34.8–46.1)
HGB BLD-MCNC: 13.6 G/DL (ref 11.5–15.4)
HGB UR QL STRIP.AUTO: ABNORMAL
IMM GRANULOCYTES # BLD AUTO: 0.01 THOUSAND/UL (ref 0–0.2)
IMM GRANULOCYTES NFR BLD AUTO: 0 % (ref 0–2)
KETONES UR STRIP-MCNC: NEGATIVE MG/DL
LEUKOCYTE ESTERASE UR QL STRIP: NEGATIVE
LYMPHOCYTES # BLD AUTO: 1.85 THOUSANDS/ÂΜL (ref 0.6–4.47)
LYMPHOCYTES NFR BLD AUTO: 32 % (ref 14–44)
MCH RBC QN AUTO: 31.1 PG (ref 26.8–34.3)
MCHC RBC AUTO-ENTMCNC: 34 G/DL (ref 31.4–37.4)
MCV RBC AUTO: 91 FL (ref 82–98)
MONOCYTES # BLD AUTO: 0.5 THOUSAND/ÂΜL (ref 0.17–1.22)
MONOCYTES NFR BLD AUTO: 9 % (ref 4–12)
NEUTROPHILS # BLD AUTO: 3.06 THOUSANDS/ÂΜL (ref 1.85–7.62)
NEUTS SEG NFR BLD AUTO: 51 % (ref 43–75)
NITRITE UR QL STRIP: NEGATIVE
NON-SQ EPI CELLS URNS QL MICRO: NORMAL /HPF
NRBC BLD AUTO-RTO: 0 /100 WBCS
PH UR STRIP.AUTO: 5.5 [PH]
PLATELET # BLD AUTO: 258 THOUSANDS/UL (ref 149–390)
PMV BLD AUTO: 9.3 FL (ref 8.9–12.7)
POTASSIUM SERPL-SCNC: 3.8 MMOL/L (ref 3.5–5.3)
PROT SERPL-MCNC: 7.4 G/DL (ref 6.4–8.4)
PROT UR STRIP-MCNC: NEGATIVE MG/DL
RBC # BLD AUTO: 4.38 MILLION/UL (ref 3.81–5.12)
RBC #/AREA URNS AUTO: NORMAL /HPF
SODIUM SERPL-SCNC: 139 MMOL/L (ref 135–147)
SP GR UR STRIP.AUTO: >=1.03 (ref 1–1.03)
UROBILINOGEN UR QL STRIP.AUTO: 0.2 E.U./DL
WBC # BLD AUTO: 5.87 THOUSAND/UL (ref 4.31–10.16)
WBC #/AREA URNS AUTO: NORMAL /HPF

## 2023-12-23 PROCEDURE — 85025 COMPLETE CBC W/AUTO DIFF WBC: CPT | Performed by: INTERNAL MEDICINE

## 2023-12-23 PROCEDURE — 80053 COMPREHEN METABOLIC PANEL: CPT | Performed by: INTERNAL MEDICINE

## 2023-12-23 PROCEDURE — G1004 CDSM NDSC: HCPCS

## 2023-12-23 PROCEDURE — 96376 TX/PRO/DX INJ SAME DRUG ADON: CPT

## 2023-12-23 PROCEDURE — 81001 URINALYSIS AUTO W/SCOPE: CPT | Performed by: INTERNAL MEDICINE

## 2023-12-23 PROCEDURE — 96361 HYDRATE IV INFUSION ADD-ON: CPT

## 2023-12-23 PROCEDURE — 74176 CT ABD & PELVIS W/O CONTRAST: CPT

## 2023-12-23 PROCEDURE — 99285 EMERGENCY DEPT VISIT HI MDM: CPT | Performed by: INTERNAL MEDICINE

## 2023-12-23 PROCEDURE — 36415 COLL VENOUS BLD VENIPUNCTURE: CPT | Performed by: INTERNAL MEDICINE

## 2023-12-23 PROCEDURE — 96375 TX/PRO/DX INJ NEW DRUG ADDON: CPT

## 2023-12-23 PROCEDURE — 96374 THER/PROPH/DIAG INJ IV PUSH: CPT

## 2023-12-23 PROCEDURE — 99285 EMERGENCY DEPT VISIT HI MDM: CPT

## 2023-12-23 RX ORDER — KETOROLAC TROMETHAMINE 30 MG/ML
15 INJECTION, SOLUTION INTRAMUSCULAR; INTRAVENOUS ONCE
Status: COMPLETED | OUTPATIENT
Start: 2023-12-23 | End: 2023-12-23

## 2023-12-23 RX ORDER — ONDANSETRON 2 MG/ML
4 INJECTION INTRAMUSCULAR; INTRAVENOUS ONCE
Status: COMPLETED | OUTPATIENT
Start: 2023-12-23 | End: 2023-12-23

## 2023-12-23 RX ORDER — OXYCODONE HYDROCHLORIDE AND ACETAMINOPHEN 5; 325 MG/1; MG/1
1 TABLET ORAL EVERY 8 HOURS PRN
Qty: 10 TABLET | Refills: 0 | Status: SHIPPED | OUTPATIENT
Start: 2023-12-23 | End: 2024-01-02

## 2023-12-23 RX ORDER — MORPHINE SULFATE 4 MG/ML
4 INJECTION, SOLUTION INTRAMUSCULAR; INTRAVENOUS ONCE
Status: COMPLETED | OUTPATIENT
Start: 2023-12-23 | End: 2023-12-23

## 2023-12-23 RX ORDER — TAMSULOSIN HYDROCHLORIDE 0.4 MG/1
0.4 CAPSULE ORAL
Qty: 14 CAPSULE | Refills: 0 | Status: SHIPPED | OUTPATIENT
Start: 2023-12-23 | End: 2024-01-06

## 2023-12-23 RX ADMIN — MORPHINE SULFATE 4 MG: 4 INJECTION INTRAVENOUS at 07:48

## 2023-12-23 RX ADMIN — KETOROLAC TROMETHAMINE 15 MG: 30 INJECTION, SOLUTION INTRAMUSCULAR at 07:45

## 2023-12-23 RX ADMIN — SODIUM CHLORIDE 1000 ML: 0.9 INJECTION, SOLUTION INTRAVENOUS at 07:50

## 2023-12-23 RX ADMIN — MORPHINE SULFATE 4 MG: 4 INJECTION INTRAVENOUS at 08:36

## 2023-12-23 RX ADMIN — ONDANSETRON 4 MG: 2 INJECTION INTRAMUSCULAR; INTRAVENOUS at 07:47

## 2023-12-23 NOTE — DISCHARGE INSTRUCTIONS
Take medication as prescribed.  Follow-up with urology as instructed.  Drink plenty of water.  Labs Reviewed   CBC AND DIFFERENTIAL - Abnormal       Result Value Ref Range Status    WBC 5.87  4.31 - 10.16 Thousand/uL Final    RBC 4.38  3.81 - 5.12 Million/uL Final    Hemoglobin 13.6  11.5 - 15.4 g/dL Final    Hematocrit 40.0  34.8 - 46.1 % Final    MCV 91  82 - 98 fL Final    MCH 31.1  26.8 - 34.3 pg Final    MCHC 34.0  31.4 - 37.4 g/dL Final    RDW 12.3  11.6 - 15.1 % Final    MPV 9.3  8.9 - 12.7 fL Final    Platelets 258  149 - 390 Thousands/uL Final    nRBC 0  /100 WBCs Final    Neutrophils Relative 51  43 - 75 % Final    Immat GRANS % 0  0 - 2 % Final    Lymphocytes Relative 32  14 - 44 % Final    Monocytes Relative 9  4 - 12 % Final    Eosinophils Relative 7 (*) 0 - 6 % Final    Basophils Relative 1  0 - 1 % Final    Neutrophils Absolute 3.06  1.85 - 7.62 Thousands/µL Final    Immature Grans Absolute 0.01  0.00 - 0.20 Thousand/uL Final    Lymphocytes Absolute 1.85  0.60 - 4.47 Thousands/µL Final    Monocytes Absolute 0.50  0.17 - 1.22 Thousand/µL Final    Eosinophils Absolute 0.41  0.00 - 0.61 Thousand/µL Final    Basophils Absolute 0.04  0.00 - 0.10 Thousands/µL Final   COMPREHENSIVE METABOLIC PANEL - Abnormal    Sodium 139  135 - 147 mmol/L Final    Potassium 3.8  3.5 - 5.3 mmol/L Final    Chloride 102  96 - 108 mmol/L Final    CO2 28  21 - 32 mmol/L Final    ANION GAP 9  mmol/L Final    BUN 21  5 - 25 mg/dL Final    Creatinine 1.12  0.60 - 1.30 mg/dL Final    Comment: Standardized to IDMS reference method    Glucose 113  65 - 140 mg/dL Final    Comment: If the patient is fasting, the ADA then defines impaired fasting glucose as > 100 mg/dL and diabetes as > or equal to 123 mg/dL.    Calcium 10.1  8.4 - 10.2 mg/dL Final    AST 12 (*) 13 - 39 U/L Final    ALT 12  7 - 52 U/L Final    Comment: Specimen collection should occur prior to Sulfasalazine administration due to the potential for falsely depressed  results.     Alkaline Phosphatase 76  34 - 104 U/L Final    Total Protein 7.4  6.4 - 8.4 g/dL Final    Albumin 4.5  3.5 - 5.0 g/dL Final    Total Bilirubin 0.44  0.20 - 1.00 mg/dL Final    Comment: Use of this assay is not recommended for patients undergoing treatment with eltrombopag due to the potential for falsely elevated results.  N-acetyl-p-benzoquinone imine (metabolite of Acetaminophen) will generate erroneously low results in samples for patients that have taken an overdose of Acetaminophen.    eGFR 54  ml/min/1.73sq m Final    Narrative:     National Kidney Disease Foundation guidelines for Chronic Kidney Disease (CKD):     Stage 1 with normal or high GFR (GFR > 90 mL/min/1.73 square meters)    Stage 2 Mild CKD (GFR = 60-89 mL/min/1.73 square meters)    Stage 3A Moderate CKD (GFR = 45-59 mL/min/1.73 square meters)    Stage 3B Moderate CKD (GFR = 30-44 mL/min/1.73 square meters)    Stage 4 Severe CKD (GFR = 15-29 mL/min/1.73 square meters)    Stage 5 End Stage CKD (GFR <15 mL/min/1.73 square meters)  Note: GFR calculation is accurate only with a steady state creatinine   UA W REFLEX TO MICROSCOPIC WITH REFLEX TO CULTURE - Abnormal    Color, UA Yellow  Yellow Final    Clarity, UA Clear  Clear Final    Specific Gravity, UA >=1.030  1.001 - 1.030 Final    pH, UA 5.5  5.0, 5.5, 6.0, 6.5, 7.0, 7.5, 8.0 Final    Leukocytes, UA Negative  Negative Final    Nitrite, UA Negative  Negative Final    Protein, UA Negative  Negative, Interference- unable to analyze mg/dl Final    Glucose, UA Negative  Negative mg/dl Final    Ketones, UA Negative  Negative mg/dl Final    Urobilinogen, UA 0.2  0.2, 1.0 E.U./dl E.U./dl Final    Bilirubin, UA Negative  Negative Final    Occult Blood, UA Trace-Intact (*) Negative Final   URINE MICROSCOPIC - Normal    RBC, UA 0-1  None Seen, 0-1, 1-2, 2-4, 0-5 /hpf Final    WBC, UA 0-1  None Seen, 0-1, 1-2, 0-5, 2-4 /hpf Final    Epithelial Cells Occasional  None Seen, Occasional /hpf Final     Bacteria, UA Occasional  None Seen, Occasional /hpf Final     CT renal stone study abdomen pelvis wo contrast   Final Result      2 mm stone at the left ureterovesicular junction. Mild upstream hydronephrosis.         Workstation performed: [id number]

## 2023-12-23 NOTE — ED PROVIDER NOTES
History  Chief Complaint   Patient presents with    Flank Pain     Pt presents to ED from home w/ left flank pain starting this morning.  Pt has a hx of kidney stones.     This is a 58 years old came for having left flank pain since [place] this morning.  Patient stated the pain is dull in nature and it is very severe.  Patient has a pain radiating to the LLQ area.  Patient has dysuria but no hematuria.  Patient has history of hysterectomy.  Patient has history of kidney stones in the past and it pass by itself.  Patient denies any fever, vomiting.  Patient denies any surgery for the kidney stones in the past.  But patient has multiple abdominal surgeries including , bowel resection, hysterectomy.  Patient also having history of diverticulitis endometriosis,HTN.         Prior to Admission Medications   Prescriptions Last Dose Informant Patient Reported? Taking?   CALCIUM-VITAMIN D PO   Yes No   Sig: Take 1 tablet by mouth daily   Multiple Vitamin (MULTIVITAMIN) tablet   Yes No   Sig: Take 1 tablet by mouth daily   TURMERIC CURCUMIN PO   Yes No   Sig: Take by mouth   lisinopril (ZESTRIL) 2.5 mg tablet   Yes No   Sig: Take 2.5 mg by mouth daily      Facility-Administered Medications: None       Past Medical History:   Diagnosis Date    Abnormal Pap smear of cervix 2020    Diverticulitis     Endometriosis     Hypertension        Past Surgical History:   Procedure Laterality Date    BOWEL RESECTION      2018    BREAST IMPLANT Bilateral      SECTION       SECTION      COLONOSCOPY      x4    EYE SURGERY      biklat eyelid surgery    FRACTURE SURGERY      right arm     HEMICOLOECTOMY W/ ANASTOMOSIS N/A 2018    Procedure: LAPAROSCOPIC SIGMOID RESECTION, MOBILIZATION OF splenic flexure;  Surgeon: YENY Michael MD;  [facility];  Service: Colorectal    HYSTERECTOMY      LAPAROSCOPIC LYSIS INTESTINAL ADHESIONS      VT SIGMOIDOSCOPY FLX DX W/COLLJ SPEC BR/WA IF PFRMD N/A 2018     Procedure: SIGMOIDOSCOPY FLEXIBLE;  Surgeon: YENY Michael MD;  Location: [facility];  Service: Colorectal       Family History   Problem Relation Age of Onset    Hypertension Mother     Hypertension Father     Breast cancer Maternal Grandmother     Breast cancer Maternal Aunt         double mastectomy     I have reviewed and agree with the history as documented.    E-Cigarette/Vaping    E-Cigarette Use Never User      E-Cigarette/Vaping Substances    Nicotine No     THC No     CBD No     Flavoring No     Other No     Unknown No      Social History     Tobacco Use    Smoking status: Never    Smokeless tobacco: Never   Vaping Use    Vaping status: Never Used   Substance Use Topics    Alcohol use: Yes     Comment: rarely    Drug use: No       Review of Systems   Constitutional:  Negative for fatigue and fever.   HENT:  Negative for congestion, sinus pressure, sinus pain, sneezing, sore throat and tinnitus.    Respiratory:  Negative for cough and shortness of breath.    Cardiovascular:  Negative for chest pain and palpitations.   Gastrointestinal:  Negative for abdominal pain, diarrhea, nausea and vomiting.   Endocrine: Negative for polydipsia, polyphagia and polyuria.   Genitourinary:  Positive for dysuria and flank pain. Negative for difficulty urinating and frequency.   Musculoskeletal:  Negative for back pain, myalgias, neck pain and neck stiffness.   Skin:  Negative for color change, pallor and rash.   Neurological:  Negative for dizziness, syncope, weakness, light-headedness and headaches.   Psychiatric/Behavioral:  Negative for agitation and behavioral problems.        Physical Exam  Physical Exam  Vitals and nursing note reviewed.   Constitutional:       General: She is not in acute distress.     Appearance: She is well-developed. She is not ill-appearing, toxic-appearing or diaphoretic.   HENT:      Head: Normocephalic and atraumatic.      Right Ear: Ear canal normal.      Left Ear: Ear canal normal.       Nose: Nose normal. No congestion or rhinorrhea.      Mouth/Throat:      Pharynx: No oropharyngeal exudate or posterior oropharyngeal erythema.   Eyes:      Extraocular Movements: Extraocular movements intact.      Pupils: Pupils are equal, round, and reactive to light.   Neck:      Vascular: No carotid bruit.   Cardiovascular:      Rate and Rhythm: Normal rate and regular rhythm.      Heart sounds: Normal heart sounds. No murmur heard.     No friction rub. No gallop.   Pulmonary:      Effort: Pulmonary effort is normal. No respiratory distress.      Breath sounds: Normal breath sounds. No wheezing, rhonchi or rales.   Abdominal:      General: Bowel sounds are normal. There is no distension.      Palpations: Abdomen is soft. There is no mass.      Tenderness: There is no abdominal tenderness. There is left CVA tenderness. There is no right CVA tenderness, guarding or rebound.      Hernia: No hernia is present.   Musculoskeletal:         General: No swelling, tenderness, deformity or signs of injury. Normal range of motion.      Cervical back: Normal range of motion and neck supple. No rigidity or tenderness.      Right lower leg: No edema.      Left lower leg: No edema.   Lymphadenopathy:      Cervical: No cervical adenopathy.   Skin:     General: Skin is warm and dry.      Capillary Refill: Capillary refill takes less than 2 seconds.      Coloration: Skin is not jaundiced or pale.      Findings: No bruising, erythema, lesion or rash.   Neurological:      Mental Status: She is alert and oriented to person, place, and time.   Psychiatric:         Behavior: Behavior normal.         Vital Signs  ED Triage Vitals   Temperature Pulse Respirations Blood Pressure SpO2   12/23/23 0719 12/23/23 0719 12/23/23 0719 12/23/23 0719 12/23/23 0719   97.6 °F (36.4 °C) 85 18 139/94 99 %      Temp Source Heart Rate Source Patient Position - Orthostatic VS BP Location FiO2 (%)   12/23/23 0719 -- -- -- --   Oral          Pain Score        12/23/23 0745       10 - Worst Possible Pain           Vitals:    12/23/23 0719   BP: 139/94   Pulse: 85         Visual Acuity      ED Medications  Medications   sodium chloride 0.9 % bolus 1,000 mL (0 mL Intravenous Stopped 12/23/23 0850)   ketorolac (TORADOL) injection 15 mg (15 mg Intravenous Given 12/23/23 0745)   morphine injection 4 mg (4 mg Intravenous Given 12/23/23 0748)   ondansetron (ZOFRAN) injection 4 mg (4 mg Intravenous Given 12/23/23 0747)   morphine injection 4 mg (4 mg Intravenous Given 12/23/23 0836)       Diagnostic Studies  Results Reviewed       Procedure Component Value Units Date/Time    Urine Microscopic [[id number]]  (Normal) Collected: 12/23/23 0744    Lab Status: Final result Specimen: Urine, Clean Catch Updated: 12/23/23 0820     RBC, UA 0-1 /hpf      WBC, UA 0-1 /hpf      Epithelial Cells Occasional /hpf      Bacteria, UA Occasional /hpf     Comprehensive metabolic panel [[id number]]  (Abnormal) Collected: 12/23/23 0744    Lab Status: Final result Specimen: Blood from Arm, Right Updated: 12/23/23 0818     Sodium 139 mmol/L      Potassium 3.8 mmol/L      Chloride 102 mmol/L      CO2 28 mmol/L      ANION GAP 9 mmol/L      BUN 21 mg/dL      Creatinine 1.12 mg/dL      Glucose 113 mg/dL      Calcium 10.1 mg/dL      AST 12 U/L      ALT 12 U/L      Alkaline Phosphatase 76 U/L      Total Protein 7.4 g/dL      Albumin 4.5 g/dL      Total Bilirubin 0.44 mg/dL      eGFR 54 ml/min/1.73sq m     Narrative:      National Kidney Disease Foundation guidelines for Chronic Kidney Disease (CKD):     Stage 1 with normal or high GFR (GFR > 90 mL/min/1.73 square meters)    Stage 2 Mild CKD (GFR = 60-89 mL/min/1.73 square meters)    Stage 3A Moderate CKD (GFR = 45-59 mL/min/1.73 square meters)    Stage 3B Moderate CKD (GFR = 30-44 mL/min/1.73 square meters)    Stage 4 Severe CKD (GFR = 15-29 mL/min/1.73 square meters)    Stage 5 End Stage CKD (GFR <15 mL/min/1.73 square meters)  Note: GFR calculation is  accurate only with a steady state creatinine    UA w Reflex to Microscopic w Reflex to Culture [[id number]]  (Abnormal) Collected: 12/23/23 0744    Lab Status: Final result Specimen: Urine, Clean Catch Updated: 12/23/23 0804     Color, UA Yellow     Clarity, UA Clear     Specific Gravity, UA >=1.030     pH, UA 5.5     Leukocytes, UA Negative     Nitrite, UA Negative     Protein, UA Negative mg/dl      Glucose, UA Negative mg/dl      Ketones, UA Negative mg/dl      Urobilinogen, UA 0.2 E.U./dl      Bilirubin, UA Negative     Occult Blood, UA Trace-Intact    CBC and differential [[id number]]  (Abnormal) Collected: 12/23/23 0744    Lab Status: Final result Specimen: Blood from Arm, Right Updated: 12/23/23 0757     WBC 5.87 Thousand/uL      RBC 4.38 Million/uL      Hemoglobin 13.6 g/dL      Hematocrit 40.0 %      MCV 91 fL      MCH 31.1 pg      MCHC 34.0 g/dL      RDW 12.3 %      MPV 9.3 fL      Platelets 258 Thousands/uL      nRBC 0 /100 WBCs      Neutrophils Relative 51 %      Immat GRANS % 0 %      Lymphocytes Relative 32 %      Monocytes Relative 9 %      Eosinophils Relative 7 %      Basophils Relative 1 %      Neutrophils Absolute 3.06 Thousands/µL      Immature Grans Absolute 0.01 Thousand/uL      Lymphocytes Absolute 1.85 Thousands/µL      Monocytes Absolute 0.50 Thousand/µL      Eosinophils Absolute 0.41 Thousand/µL      Basophils Absolute 0.04 Thousands/µL                    CT renal stone study abdomen pelvis wo contrast   Final Result by Elliott Gutierrez MD (12/23 0817)      2 mm stone at the left ureterovesicular junction. Mild upstream hydronephrosis.         Workstation performed: [id number]                    Procedures  Procedures         ED Course                                             Medical Decision Making  This is a 58 years old came for having left flank pain radiating to LLQ area.  Patient has history of kidney stones in the past.  Patient stated the pain is similar to the one she has in the  past.  Patient has dysuria no hematuria.  Patient has no fever no vomiting.  Physical exam is pertinent for having mild tenderness at the left flank area.  Abdominal exam the abdomen is soft benign no tenderness.,  No rigidity no rebound tenderness no masses.  Reviewing the patient labs including CBC, CMP within normal limits.  UA shows no infection.  CT abdomen pelvis consistent with 2 mm stone at the L UVJ, with mild upstream hydronephrosis.  Patient received IV fluids, morphine, Toradol felt better.  Patient can be discharged home and follow-up with urology. Discharge patient on Flomax, Percocet, and advised to drink plenty water.  Differential diagnosis include but not limited to; ureteral colic, renal colic, diverticulitis, colitis, ovarian cyst, TOA.    Amount and/or Complexity of Data Reviewed  Labs: ordered.     Details: CBC; WNL.  CMP; WNL .  UA; no infection.  Radiology: ordered.     Details: CT abdomen pelvis; 2 mm stone at the L UVJ with mild upstream hydronephrosis.    Risk  Prescription drug management.             Disposition  Final diagnoses:   Ureteral colic   Ureterolithiasis     Time reflects when diagnosis was documented in both MDM as applicable and the Disposition within this note       Time User Action Codes Description Comment    12/23/2023  8:33 AM Tiffanie Sherman Add [N23] Ureteral colic     12/23/2023  8:33 AM Tiffanie Sherman Add [N20.1] Ureterolithiasis           ED Disposition       ED Disposition   Discharge    Condition   Stable    Date/Time   Sat Dec 23, 2023  8:38 AM    Comment   Chance Lindsey discharge to home/self care.                   Follow-up Information       Follow up With Specialties Details Why Contact Info    [facility], Nurse Practitioner In 3 days  [address]  [address]  [facility] [address]  [phone]              Discharge Medication List as of 12/23/2023  8:49 AM        START taking these medications    Details   oxyCODONE-acetaminophen  (Percocet) 5-325 mg per tablet Take 1 tablet by mouth every 8 (eight) hours as needed for moderate pain for up to 10 days Max Daily Amount: 3 tablets, Starting Sat 12/23/2023, Until Tue 1/2/2024 at 2359, Normal      tamsulosin (FLOMAX) 0.4 mg Take 1 capsule (0.4 mg total) by mouth daily with dinner for 14 days, Starting Sat 12/23/2023, Until Sat 1/6/2024, Normal           CONTINUE these medications which have NOT CHANGED    Details   CALCIUM-VITAMIN D PO Take 1 tablet by mouth daily, Historical Med      lisinopril (ZESTRIL) 2.5 mg tablet Take 2.5 mg by mouth daily, Historical Med      Multiple Vitamin (MULTIVITAMIN) tablet Take 1 tablet by mouth daily, Historical Med      TURMERIC CURCUMIN PO Take by mouth, Historical Med             No discharge procedures on file.    PDMP Review       None            ED Provider  Electronically Signed by             Tiffanie Sherman MD  12/24/23 2003

## 2024-01-31 ENCOUNTER — OFFICE VISIT (OUTPATIENT)
Dept: GASTROENTEROLOGY | Facility: CLINIC | Age: 59
End: 2024-01-31
Payer: COMMERCIAL

## 2024-01-31 VITALS
DIASTOLIC BLOOD PRESSURE: 78 MMHG | BODY MASS INDEX: 30.19 KG/M2 | SYSTOLIC BLOOD PRESSURE: 144 MMHG | HEIGHT: 64 IN | HEART RATE: 67 BPM | WEIGHT: 176.8 LBS

## 2024-01-31 DIAGNOSIS — K57.90 DIVERTICULOSIS: ICD-10-CM

## 2024-01-31 DIAGNOSIS — K62.5 BRBPR (BRIGHT RED BLOOD PER RECTUM): ICD-10-CM

## 2024-01-31 DIAGNOSIS — Z12.11 COLON CANCER SCREENING: ICD-10-CM

## 2024-01-31 DIAGNOSIS — K64.9 HEMORRHOIDS, UNSPECIFIED HEMORRHOID TYPE: Primary | ICD-10-CM

## 2024-01-31 PROCEDURE — 99204 OFFICE O/P NEW MOD 45 MIN: CPT | Performed by: NURSE PRACTITIONER

## 2024-01-31 RX ORDER — HYDROCORTISONE ACETATE 25 MG/1
25 SUPPOSITORY RECTAL
Qty: 12 SUPPOSITORY | Refills: 2 | Status: SHIPPED | OUTPATIENT
Start: 2024-01-31 | End: 2024-02-12

## 2024-01-31 RX ORDER — MELOXICAM 15 MG/1
15 TABLET ORAL DAILY
COMMUNITY
Start: 2023-12-28

## 2024-01-31 RX ORDER — IBANDRONATE SODIUM 150 MG/1
150 TABLET, FILM COATED ORAL
COMMUNITY
Start: 2024-01-19

## 2024-01-31 RX ORDER — SIMVASTATIN 10 MG
10 TABLET ORAL EVERY EVENING
COMMUNITY
Start: 2024-01-12

## 2024-01-31 NOTE — PROGRESS NOTES
[facility] - Outpatient Consultation  Chance Lindsey 58 y.o. female MRN: [id number]  Encounter: [id number]          ASSESSMENT AND PLAN:      1. Hemorrhoids, unspecified hemorrhoid type  2. BRBPR (bright red blood per rectum)  Patient reported blood in her stool and blood when she wipes rectal area.  Patient had a colonoscopy done  8/17/2023 which showed diverticulosis in entire examined colon. Patent end to side colonic anastomosis healthy appearing mucosa.  The exam was otherwise normal on direct and retroflexion views.  No specimens collected.  Patient did have a hemoglobin done after she started having episodes of bright red blood from rectal area and in stool.  Review of colonoscopy imaging did show some bleeding from hemorrhoids at time of colonoscopy. Hemoglobin 13.6 and within normal limits done 12/23/2023.  Hemoglobin up from previous hemoglobin of 11.7 but that was done in 2018.  Blood in stool and blood when wiping most likely due to hemorrhoids since patient had recent colonoscopy and blood work done since bleeding from rectal area started.  Bleeding per rectal only occurs with bowel movements.  Patient is currently taking medication for hemorrhoids.  - hydrocortisone (ANUSOL-HC) 25 mg suppository; Insert 1 suppository (25 mg total) into the rectum daily at bedtime for 12 days  Dispense: 12 suppository; Refill: 2  -Scheduled for hemorrhoidal banding  -Avoid straining with bowel movements    3. Colon cancer screening  Up-to-date.  Colonoscopy done 8/17/2023    4. Diverticulosis  Patient has history of diverticulosis.  Patient had previous colon resection done due to diverticulosis.  Patient had recent colonoscopy done 8/17/2022 which showed diverticulosis in the entire examined colon.  -High-fiber diet    Follow-up for hemorrhoidal banding  ______________________________________________________________________    HPI: Chance Lindsey is 58-year-old female follow-up.  Patient has  a past medical history of diverticulitis, hemorrhoids, elevated cholesterol and small bowel obstruction.  Patient reported blood in her stool and blood when she wipes rectal area.  Patient had a colonoscopy done  8/17/2023 which showed diverticulosis in entire examined colon. Patent end to side colonic anastomosis healthy appearing mucosa.  The exam was otherwise normal on direct and retroflexion views.  No specimens collected.  Patient did have a hemoglobin done after she started having episodes of bright red blood from rectal area and in stool.  Hemoglobin 13.6 and within normal limits done 12/23/2023.  Hemoglobin up from previous hemoglobin of 11.7 but that was done in 2018.  Patient denies any other GI symptoms.  Patient denies nausea, vomiting, acid reflux, heartburn, epigastric or abdominal pain.  No report of black tarry stool.  Patient reports bowel patterns are regular but she does hold her bowels until she gets home from work in order to move her bowels.  Abdomen exam benign no abdominal tenderness or guarding.    Patient reports she has recently changed her diet and is eating healthy due to elevated cholesterol.  Patient drinks alcohol socially occasions.  Patient does not smoke.  No illicit drug use or marijuana use.  No family history of gastric or colon cancer.      REVIEW OF SYSTEMS:    CONSTITUTIONAL: Denies any fever, chills, rigors, and weight loss.  HEENT: No earache or tinnitus. Denies hearing loss or visual disturbances.  CARDIOVASCULAR: No chest pain or palpitations.   RESPIRATORY: Denies any cough, hemoptysis, shortness of breath or dyspnea on exertion.  GASTROINTESTINAL: As noted in the History of Present Illness.   GENITOURINARY: No problems with urination. Denies any hematuria or dysuria.  NEUROLOGIC: No dizziness or vertigo, denies headaches.   MUSCULOSKELETAL: Denies any muscle or joint pain.   SKIN: Denies skin rashes or itching.   ENDOCRINE: Denies excessive thirst. Denies intolerance  "to heat or cold.  PSYCHOSOCIAL: Denies depression or anxiety. Denies any recent memory loss.       Historical Information   Past Medical History:   Diagnosis Date    Abnormal Pap smear of cervix 2020    Diverticulitis     Diverticulitis of colon     Endometriosis     Hyperlipidemia     Hypertension      Past Surgical History:   Procedure Laterality Date    BOWEL RESECTION      2018    BREAST IMPLANT Bilateral      SECTION       SECTION      COLONOSCOPY      x4    EYE SURGERY      biklat eyelid surgery    FRACTURE SURGERY      right arm     HEMICOLOECTOMY W/ ANASTOMOSIS N/A 2018    Procedure: LAPAROSCOPIC SIGMOID RESECTION, MOBILIZATION OF splenic flexure;  Surgeon: YENY Michael MD;  [facility];  Service: Colorectal    HYSTERECTOMY      LAPAROSCOPIC LYSIS INTESTINAL ADHESIONS      CA SIGMOIDOSCOPY FLX DX W/COLLJ SPEC BR/WA IF PFRMD N/A 2018    Procedure: SIGMOIDOSCOPY FLEXIBLE;  Surgeon: YENY Michael MD;  [facility];  Service: Colorectal     Social History   Social History     Substance and Sexual Activity   Alcohol Use Yes    Comment: rarely     Social History     Substance and Sexual Activity   Drug Use No     Social History     Tobacco Use   Smoking Status Never   Smokeless Tobacco Never     Family History   Problem Relation Age of Onset    Hypertension Mother     Hypertension Father     Breast cancer Maternal Grandmother     Breast cancer Maternal Aunt         double mastectomy       Meds/Allergies       Current Outpatient Medications:     hydrocortisone (ANUSOL-HC) 25 mg suppository    meloxicam (MOBIC) 15 mg tablet    simvastatin (ZOCOR) 10 mg tablet    CALCIUM-VITAMIN D PO    ibandronate (BONIVA) 150 MG tablet    lisinopril (ZESTRIL) 2.5 mg tablet    Multiple Vitamin (MULTIVITAMIN) tablet    tamsulosin (FLOMAX) 0.4 mg    TURMERIC CURCUMIN PO    No Known Allergies        Objective     Blood pressure 144/78, pulse 67, height 5' 4\" (1.626 m), weight 80.2 " kg (176 lb 12.8 oz). Body mass index is 30.35 kg/m².        PHYSICAL EXAM:      General Appearance:   Alert, cooperative, no distress   HEENT:   Normocephalic, atraumatic, anicteric.     Neck:  Supple, symmetrical, trachea midline   Lungs:   Clear to auscultation bilaterally; no rales, rhonchi or wheezing; respirations unlabored    Heart::   Regular rate and rhythm; no murmur, rub, or gallop.   Abdomen:   Soft, non-tender, non-distended; normal bowel sounds; no masses, no organomegaly    Genitalia:   Deferred    Rectal:   Deferred    Extremities:  No cyanosis, clubbing or edema    Pulses:  2+ and symmetric    Skin:  No jaundice, rashes, or lesions    Lymph nodes:  No palpable cervical lymphadenopathy        Lab Results:   No visits with results within 1 Day(s) from this visit.   Latest known visit with results is:   Admission on 12/23/2023, Discharged on 12/23/2023   Component Date Value    WBC 12/23/2023 5.87     RBC 12/23/2023 4.38     Hemoglobin 12/23/2023 13.6     Hematocrit 12/23/2023 40.0     MCV 12/23/2023 91     MCH 12/23/2023 31.1     MCHC 12/23/2023 34.0     RDW 12/23/2023 12.3     MPV 12/23/2023 9.3     Platelets 12/23/2023 258     nRBC 12/23/2023 0     Neutrophils Relative 12/23/2023 51     Immat GRANS % 12/23/2023 0     Lymphocytes Relative 12/23/2023 32     Monocytes Relative 12/23/2023 9     Eosinophils Relative 12/23/2023 7 (H)     Basophils Relative 12/23/2023 1     Neutrophils Absolute 12/23/2023 3.06     Immature Grans Absolute 12/23/2023 0.01     Lymphocytes Absolute 12/23/2023 1.85     Monocytes Absolute 12/23/2023 0.50     Eosinophils Absolute 12/23/2023 0.41     Basophils Absolute 12/23/2023 0.04     Sodium 12/23/2023 139     Potassium 12/23/2023 3.8     Chloride 12/23/2023 102     CO2 12/23/2023 28     ANION GAP 12/23/2023 9     BUN 12/23/2023 21     Creatinine 12/23/2023 1.12     Glucose 12/23/2023 113     Calcium 12/23/2023 10.1     AST 12/23/2023 12 (L)     ALT 12/23/2023 12     Alkaline  Phosphatase 2023 76     Total Protein 2023 7.4     Albumin 2023 4.5     Total Bilirubin 2023 0.44     eGFR 2023 54     Color, UA 2023 Yellow     Clarity, UA 2023 Clear     Specific Gravity, UA 2023 >=1.030     pH, UA 2023 5.5     Leukocytes, UA 2023 Negative     Nitrite, UA 2023 Negative     Protein, UA 2023 Negative     Glucose, UA 2023 Negative     Ketones, UA 2023 Negative     Urobilinogen, UA 2023 0.2     Bilirubin, UA 2023 Negative     Occult Blood, UA 2023 Trace-Intact (A)     RBC, UA 2023 0-1     WBC, UA 2023 0-1     Epithelial Cells 2023 Occasional     Bacteria, UA 2023 Occasional          Radiology Results:   DXA BONE DENSITY SCAN STANDARD 2 SITES    Result Date: 2024  Narrative: A DXA bone mineral density examination was performed with a Hologic scanner. The patient is a 58 y.o. white female with a history of Menopausal osteoporosis .   Previous Study Comparison: 2021     The lumbar spine was examined from L2, L3, and L4. In total, the bone mineral density is 2.1 standard deviations below the predicted peak bone mass and is 79% of normal. The lumbar spine measurement is 0.849 g/cm2.   The left hip was examined in several regions. In total, the bone mineral density is 1.3 standard deviations below the predicted peak bone mass and is 83% of normal. The total hip measurement is 0.785 g/cm2.   The femoral neck measurement is 1.8 standard deviations below the predicted peak bone mass and is 77% of normal. The femoral neck measurement is 0.651 g/cm2.   The National Osteoporosis Foundation recommends that FDA approved medical therapies be considered in postmenopausal women and men age greater than or equal to 50 years with the followin. Hip or vertebral fracture; 2. T score of less than or equal to  2.5 at the spine or hip; 3. Osteopenia and either a 10 year fracture  "probability by FRAX of greater than or equal to 20% for major osteoporotic fracture or equal to 3% for hip fracture.\"   Comment: All treatment decisions, including pharmacologic treatment, require clinical judgment and consideration of individual patient factors, including patient preferences, comorbidities, previous drug use and risk factors not captured in the FRAX model, e.g. fragility, falls, vitamin D deficiency, increased bone turnover, and interval significant decline in bone mineral density.     FRAX not reported because patient is treated for osteoporosis.    Impression: IMPRESSION: 1. Osteopenia of the lumbar spine and left femoral neck corresponding to increased fracture risk. Bone mineral density has increased at these sites. Workstation:[id number]  "

## 2024-02-20 ENCOUNTER — TELEPHONE (OUTPATIENT)
Age: 59
End: 2024-02-20

## 2024-02-20 NOTE — TELEPHONE ENCOUNTER
I called and spoke to patient and advised it does not get submitted until after the appt. I also advised her she can call her insurance company to confirm if the hemorrhoidal banding will be covered. She verbalized understanding and had no further questions.

## 2024-02-20 NOTE — TELEPHONE ENCOUNTER
Patients GI provider:  NICHOLAS Mora    Number to return call: [phone]    Reason for call: Pt called in with questions regarding 2/23/2024 appt with Dr. Barclay. Patient is asking if we submitted anything to patients insurance for 1st hemorrhoidal banding. Please reach out to patient, thank you.  Scheduled procedure/appointment date if applicable: Apt/procedure 02/23/2024

## 2024-02-21 PROBLEM — Z12.11 COLON CANCER SCREENING: Status: RESOLVED | Noted: 2024-01-31 | Resolved: 2024-02-21

## 2024-02-23 ENCOUNTER — OFFICE VISIT (OUTPATIENT)
Dept: GASTROENTEROLOGY | Facility: CLINIC | Age: 59
End: 2024-02-23
Payer: COMMERCIAL

## 2024-02-23 VITALS
HEIGHT: 64 IN | BODY MASS INDEX: 29.02 KG/M2 | DIASTOLIC BLOOD PRESSURE: 81 MMHG | SYSTOLIC BLOOD PRESSURE: 125 MMHG | HEART RATE: 68 BPM | WEIGHT: 170 LBS

## 2024-02-23 DIAGNOSIS — K64.9 HEMORRHOIDS, UNSPECIFIED HEMORRHOID TYPE: ICD-10-CM

## 2024-02-23 DIAGNOSIS — K62.5 BRBPR (BRIGHT RED BLOOD PER RECTUM): Primary | ICD-10-CM

## 2024-02-23 PROCEDURE — 99213 OFFICE O/P EST LOW 20 MIN: CPT | Performed by: INTERNAL MEDICINE

## 2024-02-23 PROCEDURE — 46221 LIGATION OF HEMORRHOID(S): CPT | Performed by: INTERNAL MEDICINE

## 2024-02-23 NOTE — PROGRESS NOTES
[facility] Gastroenterology [place] - Outpatient Follow-up Note  Chance Lindsey 58 y.o. female MRN: [id number]  Encounter: [id number]          ASSESSMENT AND PLAN:      1. BRBPR (bright red blood per rectum)  -     Anal Excision Procedures    2. Hemorrhoids, unspecified hemorrhoid type  -     Anal Excision Procedures      History of blood in stools most likely hemorrhoidal colonoscopy last year otherwise was unremarkable.  Patient was seen by the office by the end and scheduled for further evaluation and possible banding.    Anoscopy performed, grade 2-3 hemorrhoids in the 2 o'clock position  and 9:00.  5 o'clock position hemorrhoid was grade 1.    Anal Excision Procedures    Performed by: Henry Barclay MD  Authorized by: Henry Barclay MD    Universal Protocol:     Verbal consent obtained?: Yes      Written consent obtained?: Yes      Risks and benefits: Risks, benefits and alternatives were discussed      Consent given by:  Patient    Patient states understanding of procedure being performed: Yes      Patient identity confirmed:  Verbally with patient  A time out verifies correct patient, procedure, equipment, support staff and site/side marked as required:   Procedure Type: hemorrhoidectomy    Hemorrhoidectomy Details:   Hemorrhoid type: internal    Internal position:  Two o'clock  Single rubber band ligation      Patient will return for banding at 9 o'clock position  ______________________________________________________________________    SUBJECTIVE:      Patient came in for follow-up evaluation of her history of blood in stools, painless, sometimes with straining, denies any excessive constipation painful defecation appetite is remained stable colonoscopy last year done at [facility] endoscopy center for screening was normal with small internal hemorrhoids.  Denies chest pain shortness of breath weak weakness fever chills rash diet medications some of the current and prior records  "noted.      REVIEW OF SYSTEMS IS OTHERWISE NEGATIVE.      Historical Information   Past Medical History:   Diagnosis Date   • Abnormal Pap smear of cervix    • Diverticulitis    • Diverticulitis of colon    • Endometriosis    • Hyperlipidemia    • Hypertension      Past Surgical History:   Procedure Laterality Date   • BOWEL RESECTION      2018   • BREAST IMPLANT Bilateral    •  SECTION     •  SECTION     • COLONOSCOPY      x4   • EYE SURGERY      biklat eyelid surgery   • FRACTURE SURGERY      right arm    • HEMICOLOECTOMY W/ ANASTOMOSIS N/A 2018    Procedure: LAPAROSCOPIC SIGMOID RESECTION, MOBILIZATION OF splenic flexure;  Surgeon: YENY Michael MD;  [facility];  Service: Colorectal   • HYSTERECTOMY     • LAPAROSCOPIC LYSIS INTESTINAL ADHESIONS     • PA SIGMOIDOSCOPY FLX DX W/COLLJ SPEC BR/WA IF PFRMD N/A 2018    Procedure: SIGMOIDOSCOPY FLEXIBLE;  Surgeon: YENY Michael MD;  [facility];  Service: Colorectal     Social History   Social History     Substance and Sexual Activity   Alcohol Use Yes    Comment: rarely     Social History     Substance and Sexual Activity   Drug Use No     Social History     Tobacco Use   Smoking Status Never   Smokeless Tobacco Never     Family History   Problem Relation Age of Onset   • Hypertension Mother    • Hypertension Father    • Breast cancer Maternal Grandmother    • Breast cancer Maternal Aunt         double mastectomy       Meds/Allergies       Current Outpatient Medications:   •  ibandronate (BONIVA) 150 MG tablet  •  lisinopril (ZESTRIL) 2.5 mg tablet  •  meloxicam (MOBIC) 15 mg tablet  •  simvastatin (ZOCOR) 10 mg tablet  •  CALCIUM-VITAMIN D PO  •  hydrocortisone (ANUSOL-HC) 25 mg suppository  •  Multiple Vitamin (MULTIVITAMIN) tablet  •  tamsulosin (FLOMAX) 0.4 mg  •  TURMERIC CURCUMIN PO    No Known Allergies        Objective     Blood pressure 125/81, pulse 68, height 5' 4\" (1.626 m), weight 77.1 kg (170 " lb). Body mass index is 29.18 kg/m².      PHYSICAL EXAM:      General Appearance:   Alert, cooperative, no distress   HEENT:   Normocephalic, atraumatic, anicteric.     Neck:  Supple, symmetrical, trachea midline   Lungs:   Clear to auscultation bilaterally; no rales, rhonchi or wheezing; respirations unlabored    Heart::   Regular rate and rhythm; no murmur.   Abdomen:   Soft, non-tender, non-distended; normal bowel sounds; no masses, no organomegaly    Genitalia:   Deferred    Rectal:   Deferred    Extremities:  No cyanosis, clubbing or edema    Skin:  No jaundice, rashes, or lesions    Lymph nodes:  No palpable cervical lymphadenopathy        Lab Results:   No visits with results within 1 Day(s) from this visit.   Latest known visit with results is:   Admission on 12/23/2023, Discharged on 12/23/2023   Component Date Value   • WBC 12/23/2023 5.87    • RBC 12/23/2023 4.38    • Hemoglobin 12/23/2023 13.6    • Hematocrit 12/23/2023 40.0    • MCV 12/23/2023 91    • MCH 12/23/2023 31.1    • MCHC 12/23/2023 34.0    • RDW 12/23/2023 12.3    • MPV 12/23/2023 9.3    • Platelets 12/23/2023 258    • nRBC 12/23/2023 0    • Neutrophils Relative 12/23/2023 51    • Immat GRANS % 12/23/2023 0    • Lymphocytes Relative 12/23/2023 32    • Monocytes Relative 12/23/2023 9    • Eosinophils Relative 12/23/2023 7 (H)    • Basophils Relative 12/23/2023 1    • Neutrophils Absolute 12/23/2023 3.06    • Immature Grans Absolute 12/23/2023 0.01    • Lymphocytes Absolute 12/23/2023 1.85    • Monocytes Absolute 12/23/2023 0.50    • Eosinophils Absolute 12/23/2023 0.41    • Basophils Absolute 12/23/2023 0.04    • Sodium 12/23/2023 139    • Potassium 12/23/2023 3.8    • Chloride 12/23/2023 102    • CO2 12/23/2023 28    • ANION GAP 12/23/2023 9    • BUN 12/23/2023 21    • Creatinine 12/23/2023 1.12    • Glucose 12/23/2023 113    • Calcium 12/23/2023 10.1    • AST 12/23/2023 12 (L)    • ALT 12/23/2023 12    • Alkaline Phosphatase 12/23/2023 76     • Total Protein 12/23/2023 7.4    • Albumin 12/23/2023 4.5    • Total Bilirubin 12/23/2023 0.44    • eGFR 12/23/2023 54    • Color, UA 12/23/2023 Yellow    • Clarity, UA 12/23/2023 Clear    • Specific Gravity, UA 12/23/2023 >=1.030    • pH, UA 12/23/2023 5.5    • Leukocytes, UA 12/23/2023 Negative    • Nitrite, UA 12/23/2023 Negative    • Protein, UA 12/23/2023 Negative    • Glucose, UA 12/23/2023 Negative    • Ketones, UA 12/23/2023 Negative    • Urobilinogen, UA 12/23/2023 0.2    • Bilirubin, UA 12/23/2023 Negative    • Occult Blood, UA 12/23/2023 Trace-Intact (A)    • RBC, UA 12/23/2023 0-1    • WBC, UA 12/23/2023 0-1    • Epithelial Cells 12/23/2023 Occasional    • Bacteria, UA 12/23/2023 Occasional          Radiology Results:   No results found.

## 2024-02-26 ENCOUNTER — NURSE TRIAGE (OUTPATIENT)
Age: 59
End: 2024-02-26

## 2024-02-26 NOTE — TELEPHONE ENCOUNTER
"Pt. Had banding done on Friday, noticed some bright red blood when wiping today, advised it is normal after banding to have some bleeding and that the band will sluff off on it's own, advised to monitor for increased bleeding and to call back for re-evaluation, pt. Verbalized understanding         Reason for Disposition   Mild rectal pain    Additional Information   Rectal symptoms    Answer Assessment - Initial Assessment Questions  1. APPEARANCE of BLOOD: \"What color is it?\" \"Is it passed separately, on the surface of the stool, or mixed in with the stool?\"       Bright red   2. AMOUNT: \"How much blood was passed?\"       Small amount   3. FREQUENCY: \"How many times has blood been passed with the stools?\"       Once today   4. ONSET: \"When was the blood first seen in the stools?\" (Days or weeks)       Today   5. DIARRHEA: \"Is there also some diarrhea?\" If Yes, ask: \"How many diarrhea stools were passed in past 24 hours?\"       Denies   6. CONSTIPATION: \"Do you have constipation?\" If Yes, ask: \"How bad is it?\"      Denies   7. RECURRENT SYMPTOMS: \"Have you had blood in your stools before?\" If Yes, ask: \"When was the last time?\" and \"What happened that time?\"       Yes, hemorrhoids   8. BLOOD THINNERS: \"Do you take any blood thinners?\" (e.g., Coumadin/warfarin, Pradaxa/dabigatran, aspirin)      Denies   9. OTHER SYMPTOMS: \"Do you have any other symptoms?\"  (e.g., abdominal pain, vomiting, dizziness, fever)      Denies   10. PREGNANCY: \"Is there any chance you are pregnant?\" \"When was your last menstrual period?\"        Denies    Protocols used: Rectal Bleeding-ADULT-[place], Rectal Symptoms-ADULT-[place]    "

## 2024-03-20 ENCOUNTER — OFFICE VISIT (OUTPATIENT)
Dept: GASTROENTEROLOGY | Facility: CLINIC | Age: 59
End: 2024-03-20
Payer: COMMERCIAL

## 2024-03-20 VITALS
BODY MASS INDEX: 29.02 KG/M2 | HEART RATE: 80 BPM | WEIGHT: 170 LBS | SYSTOLIC BLOOD PRESSURE: 131 MMHG | HEIGHT: 64 IN | DIASTOLIC BLOOD PRESSURE: 80 MMHG

## 2024-03-20 DIAGNOSIS — K62.5 BRBPR (BRIGHT RED BLOOD PER RECTUM): Primary | ICD-10-CM

## 2024-03-20 DIAGNOSIS — K64.9 HEMORRHOIDS, UNSPECIFIED HEMORRHOID TYPE: ICD-10-CM

## 2024-03-20 PROCEDURE — 46221 LIGATION OF HEMORRHOID(S): CPT | Performed by: INTERNAL MEDICINE

## 2024-03-20 NOTE — PROGRESS NOTES
Anal Excision Procedures    Performed by: Henry Barclay MD  Authorized by: Henry Barclay MD    Universal Protocol:     Verbal consent obtained?: Yes      Written consent obtained?: Yes      Risks and benefits: Risks, benefits and alternatives were discussed      Consent given by:  Patient    Patient identity confirmed:  Verbally with patient  A time out verifies correct patient, procedure, equipment, support staff and site/side marked as required:   Procedure Type: hemorrhoidectomy    Hemorrhoidectomy Details:   Hemorrhoid type: internal    Internal position:  Nine o'clock  Single rubber band ligation

## 2024-04-05 ENCOUNTER — TELEPHONE (OUTPATIENT)
Dept: GASTROENTEROLOGY | Facility: AMBULARY SURGERY CENTER | Age: 59
End: 2024-04-05

## 2024-04-05 NOTE — TELEPHONE ENCOUNTER
Called spoke to patient regarding MyChart message.  She last had banding 3/20.  She is doing very well however had an episode of blood yesterday.  This morning she did not have bleeding. Dr Barclay is off next week.  She has Anusol suppository course and I recommend doing this once a day for 7 days.  If persistent bleeding can reach out at that time and can discuss with Dr. Barclay if he would like to do repeat banding.  She was appreciative of call and will let us know any questions or concerns in the meantime

## 2024-05-14 ENCOUNTER — OFFICE VISIT (OUTPATIENT)
Dept: GASTROENTEROLOGY | Facility: CLINIC | Age: 59
End: 2024-05-14
Payer: COMMERCIAL

## 2024-05-14 VITALS
HEART RATE: 77 BPM | WEIGHT: 170 LBS | SYSTOLIC BLOOD PRESSURE: 139 MMHG | HEIGHT: 64 IN | DIASTOLIC BLOOD PRESSURE: 81 MMHG | BODY MASS INDEX: 29.02 KG/M2

## 2024-05-14 DIAGNOSIS — K62.5 BRBPR (BRIGHT RED BLOOD PER RECTUM): Primary | ICD-10-CM

## 2024-05-14 DIAGNOSIS — K64.9 HEMORRHOIDS, UNSPECIFIED HEMORRHOID TYPE: ICD-10-CM

## 2024-05-14 DIAGNOSIS — K57.90 DIVERTICULOSIS: ICD-10-CM

## 2024-05-14 PROCEDURE — 99213 OFFICE O/P EST LOW 20 MIN: CPT | Performed by: INTERNAL MEDICINE

## 2024-05-14 PROCEDURE — 46221 LIGATION OF HEMORRHOID(S): CPT | Performed by: INTERNAL MEDICINE

## 2024-05-14 NOTE — PROGRESS NOTES
[facility] Gastroenterology [place] - Outpatient Follow-up Note  Chance Lindsey 58 y.o. female MRN: [id number]  Encounter: [id number]          ASSESSMENT AND PLAN:      1. BRBPR (bright red blood per rectum)    2. Hemorrhoids, unspecified hemorrhoid type  -     Anal Excision Procedures    3. Diverticulosis      Patient has had this blood in stools off-and-on, random, painless with no straining.  2 hemorrhoids were banded but bleeding has decreased though still persisted.  No Dizziness weakness.  Most likely recurrent hemorrhoidal bleeding.    Digital rectal exam performed and some blood staining on the glove.  Endoscopy performed, hemorrhoids seen at 4 o'clock position with some bleeding just with anoscopy.    Anal Excision Procedures    Performed by: Henry Barclay MD  Authorized by: Henry Barclay MD    Universal Protocol:     Verbal consent obtained?: Yes      Written consent obtained?: Yes      Risks and benefits: Risks, benefits and alternatives were discussed      Consent given by:  Patient    Patient states understanding of procedure being performed: Yes      Patient identity confirmed:  Verbally with patient  A time out verifies correct patient, procedure, equipment, support staff and site/side marked as required:   Procedure Type: hemorrhoidectomy    Hemorrhoidectomy Details:   Hemorrhoid type: internal    Internal position:  Four o'clock and five o'clock  Multiple rubber band ligations      2 Bands were placed.  Patient had no discomfort after the procedure.  If bleeding still persist then will consider surgical option.  ______________________________________________________________________    SUBJECTIVE:      Patient came in for evaluation of her blood in stools, bright red in color sometime in the bowel movement.  She had 2 hemorrhoids banded couple months ago, at 4:00 and 9 o'clock position.  She denies any straining hard stools constipation, bowels generally soft, she does consume fair amount of  fruits vegetables salads and water.  Denies any nausea vomiting hematemesis melena.  Denies easy bleeding bruising, no known history of liver disease, does not take any blood thinners.  Diet medications more than 10 pertinent systems some of the prior records noted.  Colonoscopy from couple years ago noted.      REVIEW OF SYSTEMS IS OTHERWISE NEGATIVE.      Historical Information   Past Medical History:   Diagnosis Date   • Abnormal Pap smear of cervix    • Diverticulitis    • Diverticulitis of colon    • Endometriosis    • Hyperlipidemia    • Hypertension      Past Surgical History:   Procedure Laterality Date   • BOWEL RESECTION      2018   • BREAST IMPLANT Bilateral    •  SECTION     •  SECTION     • COLONOSCOPY      x4   • EYE SURGERY      biklat eyelid surgery   • FRACTURE SURGERY      right arm    • HEMICOLOECTOMY W/ ANASTOMOSIS N/A 2018    Procedure: LAPAROSCOPIC SIGMOID RESECTION, MOBILIZATION OF splenic flexure;  Surgeon: YENY Michael MD;  [facility];  Service: Colorectal   • HYSTERECTOMY     • LAPAROSCOPIC LYSIS INTESTINAL ADHESIONS     • [place] SIGMOIDOSCOPY FLX DX W/COLLJ SPEC BR/WA IF PFRMD N/A 2018    Procedure: SIGMOIDOSCOPY FLEXIBLE;  Surgeon: YENY Michael MD;  [facility];  Service: Colorectal     Social History   Social History     Substance and Sexual Activity   Alcohol Use Yes    Comment: rarely     Social History     Substance and Sexual Activity   Drug Use No     Social History     Tobacco Use   Smoking Status Never   Smokeless Tobacco Never     Family History   Problem Relation Age of Onset   • Hypertension Mother    • Hypertension Father    • Breast cancer Maternal Grandmother    • Breast cancer Maternal Aunt         double mastectomy       Meds/Allergies       Current Outpatient Medications:   •  ibandronate (BONIVA) 150 MG tablet  •  lisinopril (ZESTRIL) 2.5 mg tablet  •  simvastatin (ZOCOR) 10 mg tablet  •  CALCIUM-VITAMIN D PO  •   "hydrocortisone (ANUSOL-HC) 25 mg suppository  •  meloxicam (MOBIC) 15 mg tablet  •  Multiple Vitamin (MULTIVITAMIN) tablet  •  tamsulosin (FLOMAX) 0.4 mg  •  TURMERIC CURCUMIN PO    No Known Allergies        Objective     Blood pressure 139/81, pulse 77, height 5' 4\" (1.626 m), weight 77.1 kg (170 lb). Body mass index is 29.18 kg/m².      PHYSICAL EXAM:      General Appearance:   Alert, cooperative, no distress   HEENT:   Normocephalic, atraumatic, anicteric.     Neck:  Supple, symmetrical, trachea midline   Lungs:   Clear to auscultation bilaterally; no rales, rhonchi or wheezing; respirations unlabored    Heart::   Regular rate and rhythm; no murmur.   Abdomen:   Soft, non-tender, non-distended; normal bowel sounds; no masses, no organomegaly    Genitalia:   Deferred    Rectal:   As above   Extremities:  No cyanosis, clubbing or edema    Skin:  No jaundice, rashes, or lesions    Lymph nodes:  No palpable cervical lymphadenopathy        Lab Results:   No visits with results within 1 Day(s) from this visit.   Latest known visit with results is:   Admission on 12/23/2023, Discharged on 12/23/2023   Component Date Value   • WBC 12/23/2023 5.87    • RBC 12/23/2023 4.38    • Hemoglobin 12/23/2023 13.6    • Hematocrit 12/23/2023 40.0    • MCV 12/23/2023 91    • MCH 12/23/2023 31.1    • MCHC 12/23/2023 34.0    • RDW 12/23/2023 12.3    • MPV 12/23/2023 9.3    • Platelets 12/23/2023 258    • nRBC 12/23/2023 0    • Segmented % 12/23/2023 51    • Immature Grans % 12/23/2023 0    • Lymphocytes % 12/23/2023 32    • Monocytes % 12/23/2023 9    • Eosinophils Relative 12/23/2023 7 (H)    • Basophils Relative 12/23/2023 1    • Absolute Neutrophils 12/23/2023 3.06    • Absolute Immature Grans 12/23/2023 0.01    • Absolute Lymphocytes 12/23/2023 1.85    • Absolute Monocytes 12/23/2023 0.50    • Eosinophils Absolute 12/23/2023 0.41    • Basophils Absolute 12/23/2023 0.04    • Sodium 12/23/2023 139    • Potassium 12/23/2023 3.8    • " Chloride 12/23/2023 102    • CO2 12/23/2023 28    • ANION GAP 12/23/2023 9    • BUN 12/23/2023 21    • Creatinine 12/23/2023 1.12    • Glucose 12/23/2023 113    • Calcium 12/23/2023 10.1    • AST 12/23/2023 12 (L)    • ALT 12/23/2023 12    • Alkaline Phosphatase 12/23/2023 76    • Total Protein 12/23/2023 7.4    • Albumin 12/23/2023 4.5    • Total Bilirubin 12/23/2023 0.44    • eGFR 12/23/2023 54    • Color, UA 12/23/2023 Yellow    • Clarity, UA 12/23/2023 Clear    • Specific Gravity, UA 12/23/2023 >=1.030    • pH, UA 12/23/2023 5.5    • Leukocytes, UA 12/23/2023 Negative    • Nitrite, UA 12/23/2023 Negative    • Protein, UA 12/23/2023 Negative    • Glucose, UA 12/23/2023 Negative    • Ketones, UA 12/23/2023 Negative    • Urobilinogen, UA 12/23/2023 0.2    • Bilirubin, UA 12/23/2023 Negative    • Occult Blood, UA 12/23/2023 Trace-Intact (A)    • RBC, UA 12/23/2023 0-1    • WBC, UA 12/23/2023 0-1    • Epithelial Cells 12/23/2023 Occasional    • Bacteria, UA 12/23/2023 Occasional          Radiology Results:   No results found.

## 2024-09-03 NOTE — PROGRESS NOTES
Colon and Rectal Surgery   Chance Lindsey 58 y.o. female MRN [id number]  Encounter: [id number]  09/05/24 12:25 PM            Assessment: Chance Lindsey is a 58 y.o. female who has bleeding hemorrhoids.      Plan:   Bleeding hemorrhoids  This is not a large amount of bleeding.    Anoscopy reveals anterior hemorrhoidal irritation with apparent scarring.  I cannot lift the mucosa to perform rubber banding, as the mucosa seems to be adherent to underlying tissue.  The area appeared friable.  There may be some rectal intussusception.    I recommended a high-fiber diet with fiber supplementation.  He is to avoid wipe trauma.  If symptoms resolve, no further therapy may be needed.  She had colonoscopy 1 year ago.    I will have her return in 6 weeks.  If symptoms persist, repeat colonoscopy with biopsies of the distal rectal tissues may be indicated.  Radiofrequency ablation could be considered to control the areas of bleeding.    She was given a high-fiber sheet with instructions on high-fiber management.  I asked her to stop her magnesium for the time being.      Subjective     HPI    Chance Lindsey is a 58 y.o. female who presents for rectal bleeding. She was last seen in the office on 6/19/20.     The patient is status post banding ligation on 5/14/24 with Dr. ADOLPH Barclay.     She is status post laparoscopic sigmoid resection, mobilization of splenic flexure, flexible sigmoidoscopy, lysis of adhesions on 8/24/18 for diverticulitis.     The patient reports that she has rectal bleeding and brown mucus discharge from the rectum. The bleeding occurs almost daily with most bowel movements. This is bright red blood upon wiping and in the toilet bowl. She has the mucus discharge from the rectum most mornings.     Historical Information   Past Medical History:   Diagnosis Date    Abnormal Pap smear of cervix 2020    Diverticulitis     Diverticulitis of colon     Endometriosis     Hyperlipidemia     Hypertension      Past Surgical  History:   Procedure Laterality Date    BOWEL RESECTION      2018    BREAST IMPLANT Bilateral      SECTION       SECTION      COLONOSCOPY      x4    EYE SURGERY      biklat eyelid surgery    FRACTURE SURGERY      right arm     HEMICOLOECTOMY W/ ANASTOMOSIS N/A 2018    Procedure: LAPAROSCOPIC SIGMOID RESECTION, MOBILIZATION OF splenic flexure;  Surgeon: YENY Michael MD;  [facility];  Service: Colorectal    HYSTERECTOMY      LAPAROSCOPIC LYSIS INTESTINAL ADHESIONS      UT SIGMOIDOSCOPY FLX DX W/COLLJ SPEC BR/WA IF PFRMD N/A 2018    Procedure: SIGMOIDOSCOPY FLEXIBLE;  Surgeon: EYNY Michael MD;  [facility];  Service: Colorectal       Meds/Allergies       Current Outpatient Medications:     ibandronate (BONIVA) 150 MG tablet, Take 150 mg by mouth every 30 (thirty) days, Disp: , Rfl:     lisinopril (ZESTRIL) 2.5 mg tablet, Take 2.5 mg by mouth daily, Disp: , Rfl:     simvastatin (ZOCOR) 10 mg tablet, Take 10 mg by mouth every evening, Disp: , Rfl:     CALCIUM-VITAMIN D PO, Take 1 tablet by mouth daily (Patient not taking: Reported on 2024), Disp: , Rfl:     hydrocortisone (ANUSOL-HC) 25 mg suppository, Insert 1 suppository (25 mg total) into the rectum daily at bedtime for 12 days, Disp: 12 suppository, Rfl: 2    meloxicam (MOBIC) 15 mg tablet, Take 15 mg by mouth daily (Patient not taking: Reported on 2024), Disp: , Rfl:     Multiple Vitamin (MULTIVITAMIN) tablet, Take 1 tablet by mouth daily (Patient not taking: Reported on 2024), Disp: , Rfl:     tamsulosin (FLOMAX) 0.4 mg, Take 1 capsule (0.4 mg total) by mouth daily with dinner for 14 days (Patient not taking: Reported on 2024), Disp: 14 capsule, Rfl: 0    TURMERIC CURCUMIN PO, Take by mouth (Patient not taking: Reported on 2024), Disp: , Rfl:   No Known Allergies    Social History   Social History     Substance and Sexual Activity   Drug Use No     Social History     Tobacco Use  "  Smoking Status Never   Smokeless Tobacco Never         Family History   Problem Relation Age of Onset    Hypertension Mother     Hypertension Father     Breast cancer Maternal Grandmother     Breast cancer Maternal Aunt         double mastectomy         Review of Systems   Constitutional: Negative.    Gastrointestinal:  Positive for anal bleeding and constipation.       Objective   Current Vitals:  Vitals:    09/05/24 1200   Weight: 81.2 kg (179 lb)   Height: 5' 4\" (1.626 m)         Physical Exam  Constitutional:       Appearance: Normal appearance.   Genitourinary:     Comments: Digital examination reveals anterior thickening of the hemorrhoidal tissues.  Anoscopy shows small amount of intussusception and anterior scarring.  Rubber banding was attempted but I cannot lift any tissue away from the rectal wall/muscularis propria.  The anterior tissue is somewhat friable.  No other abnormalities were identified.  Neurological:      Mental Status: She is alert.     Lower Endoscopy    Date/Time: 9/5/2024 11:40 AM    Performed by: YENY Michael MD  Authorized by: YENY Michael MD    Verbal consent obtained?: Yes    Consent given by:  Patient  Scope type:  Anoscope   Digital examination reveals anterior thickening of the hemorrhoidal tissues.  Anoscopy shows small amount of intussusception and anterior scarring.  Rubber banding was attempted but I cannot lift any tissue away from the rectal wall/muscularis propria.  The anterior tissue is somewhat friable.  No other abnormalities were identified.              "

## 2024-09-05 ENCOUNTER — OFFICE VISIT (OUTPATIENT)
Age: 59
End: 2024-09-05
Payer: COMMERCIAL

## 2024-09-05 VITALS — BODY MASS INDEX: 30.56 KG/M2 | WEIGHT: 179 LBS | HEIGHT: 64 IN

## 2024-09-05 DIAGNOSIS — K64.9 BLEEDING HEMORRHOIDS: Primary | ICD-10-CM

## 2024-09-05 PROCEDURE — 46600 DIAGNOSTIC ANOSCOPY SPX: CPT | Performed by: COLON & RECTAL SURGERY

## 2024-09-05 PROCEDURE — 99214 OFFICE O/P EST MOD 30 MIN: CPT | Performed by: COLON & RECTAL SURGERY

## 2024-09-05 NOTE — ASSESSMENT & PLAN NOTE
This is not a large amount of bleeding.    Anoscopy reveals anterior hemorrhoidal irritation with apparent scarring.  I cannot lift the mucosa to perform rubber banding, as the mucosa seems to be adherent to underlying tissue.  The area appeared friable.  There may be some rectal intussusception.    I recommended a high-fiber diet with fiber supplementation.  He is to avoid wipe trauma.  If symptoms resolve, no further therapy may be needed.  She had colonoscopy 1 year ago.    I will have her return in 6 weeks.  If symptoms persist, repeat colonoscopy with biopsies of the distal rectal tissues may be indicated.  Radiofrequency ablation could be considered to control the areas of bleeding.    She was given a high-fiber sheet with instructions on high-fiber management.  I asked her to stop her magnesium for the time being.

## 2024-09-23 ENCOUNTER — TELEPHONE (OUTPATIENT)
Age: 59
End: 2024-09-23

## 2024-09-23 NOTE — TELEPHONE ENCOUNTER
Patients GI provider:  Dr. Michael     Number to return call: [phone]    Reason for call: Pt calling to speak with the  for [employer] when possible. She did not want to disclose the reason for her call and would prefer to discuss that once called back. She is specifically asking to speak with Liz.

## 2024-09-24 NOTE — TELEPHONE ENCOUNTER
Pt called back - she needs an excuse for jury duty .  She was called to serve next month around the same time her and Dr Michael discussed removing a mass . She does not want to put off the procedure-- the mass concerns her greatly and the trial could go weeks, a month or more.   She needs this ASAP - they only gave her 5 days from the date of the letter she got yesterday. Sent message to office to complete so she can pick it up.    36.8

## 2024-09-25 NOTE — TELEPHONE ENCOUNTER
Patient contacted office requesting excuse note for jury duty. Call transferred to office to further assist.

## 2024-10-21 NOTE — PROGRESS NOTES
Colon and Rectal Surgery   Chance Lindsey 58 y.o. female MRN [id number]  Encounter: [id number]  10/23/24 3:10 PM            Assessment: Chance Lindsey is a 58 y.o. female who has rectal bleeding.      Plan:   Anal bleeding  The patient has a year-long history of mucus stools and rectal bleeding.  She was treated with rubber banding's that had transient effect over the year.  When I attempted rubber banding, I found that the hemorrhoidal tissues were very flat and adhered, prohibiting rubber banding.  There was a possibility of distal rectal inflammation seen at the time of anoscopy.  The patient's symptoms have persisted.  She tried a high-fiber diet but that made symptoms worse prompting her to reduce her fiber intake.    I think my best option here is to rule out underlying ulcerative proctitis or colitis.  Intubation of the terminal ileum will be attempted.  If the disease is very specific and limited, radiofrequency ablation may be considered.  If there is apparent proctitis, biopsies will be done to confirm the diagnosis and no treatment will be given.  The patient understands this plan.  I will avoid extensive radiofrequency ablation at this visit.  Colonoscopy risks, not limited to bleeding, perforated colon, need for surgery, and missed lesions were discussed. Alternatives were discussed. Questions were answered. She agreed to the procedure.       Subjective     HPI    Chance Lindsey is a 58 y.o. female who is here for a 6 week follow up to bleeding hemorrhoids.     The patient notes advised high intake of fiber was worsening rectal bleeding. Instead of 25 g she has been doing around 10 g of fiber per day. States status of bleeding and mucus discharge per rectum now is the same as previously. Notes soft stools.     Last seen in the office on 9/5/2024; at the time it was reviewed that if symptoms persist, repeat colonoscopy with biopsies of the distal rectal tissues may be indicated. Radiofrequency ablation  could be considered to control the areas of bleeding.       She is status post laparoscopic sigmoid resection, mobilization of splenic flexure, flexible sigmoidoscopy, lysis of adhesions on 18 for diverticulitis.     Historical Information   Past Medical History:   Diagnosis Date    Abnormal Pap smear of cervix 2020    Diverticulitis     Diverticulitis of colon     Endometriosis     Hyperlipidemia     Hypertension      Past Surgical History:   Procedure Laterality Date    BOWEL RESECTION      2018    BREAST IMPLANT Bilateral      SECTION       SECTION      COLONOSCOPY      x4    EYE SURGERY      biklat eyelid surgery    FRACTURE SURGERY      right arm     HEMICOLOECTOMY W/ ANASTOMOSIS N/A 2018    Procedure: LAPAROSCOPIC SIGMOID RESECTION, MOBILIZATION OF splenic flexure;  Surgeon: YENY Michael MD;  Location: [facility];  Service: Colorectal    HYSTERECTOMY      LAPAROSCOPIC LYSIS INTESTINAL ADHESIONS      VT SIGMOIDOSCOPY FLX DX W/COLLJ SPEC BR/WA IF PFRMD N/A 2018    Procedure: SIGMOIDOSCOPY FLEXIBLE;  Surgeon: YENY Michael MD;  Location: [facility];  Service: Colorectal       Meds/Allergies       Current Outpatient Medications:     amoxicillin (AMOXIL) 500 mg capsule, TAKE 4 CAPSULES 1 HOUR PRIOR TO ANY DENTAL PROCEDURE, Disp: , Rfl:     ibandronate (BONIVA) 150 MG tablet, Take 150 mg by mouth every 30 (thirty) days, Disp: , Rfl:     lisinopril (ZESTRIL) 2.5 mg tablet, Take 2.5 mg by mouth daily, Disp: , Rfl:     simvastatin (ZOCOR) 10 mg tablet, Take 10 mg by mouth every evening, Disp: , Rfl:     CALCIUM-VITAMIN D PO, Take 1 tablet by mouth daily (Patient not taking: Reported on 2024), Disp: , Rfl:     hydrocortisone (ANUSOL-HC) 25 mg suppository, Insert 1 suppository (25 mg total) into the rectum daily at bedtime for 12 days, Disp: 12 suppository, Rfl: 2    meloxicam (MOBIC) 15 mg tablet, Take 15 mg by mouth daily (Patient not taking: Reported on 2024), Disp:  ", Rfl:     Multiple Vitamin (MULTIVITAMIN) tablet, Take 1 tablet by mouth daily (Patient not taking: Reported on 1/31/2024), Disp: , Rfl:     tamsulosin (FLOMAX) 0.4 mg, Take 1 capsule (0.4 mg total) by mouth daily with dinner for 14 days (Patient not taking: Reported on 1/31/2024), Disp: 14 capsule, Rfl: 0    TURMERIC CURCUMIN PO, Take by mouth (Patient not taking: Reported on 1/31/2024), Disp: , Rfl:   No Known Allergies    Social History   Social History     Substance and Sexual Activity   Drug Use No     Social History     Tobacco Use   Smoking Status Never   Smokeless Tobacco Never         Family History   Problem Relation Age of Onset    Hypertension Mother     Hypertension Father     Breast cancer Maternal Grandmother     Breast cancer Maternal Aunt         double mastectomy         Review of Systems    Objective   Current Vitals:  Vitals:    10/23/24 1442   Weight: 80.3 kg (177 lb)   Height: 5' 4\" (1.626 m)         Physical Exam  Constitutional:       Appearance: Normal appearance.   Cardiovascular:      Rate and Rhythm: Normal rate and regular rhythm.   Pulmonary:      Effort: Pulmonary effort is normal.      Breath sounds: Normal breath sounds.   Neurological:      General: No focal deficit present.      Mental Status: She is alert and oriented to person, place, and time.               "

## 2024-10-21 NOTE — H&P (VIEW-ONLY)
Colon and Rectal Surgery   Chance Lindsey 58 y.o. female MRN [id number]  Encounter: [id number]  10/23/24 3:10 PM            Assessment: Chance Lindsey is a 58 y.o. female who has rectal bleeding.      Plan:   Anal bleeding  The patient has a year-long history of mucus stools and rectal bleeding.  She was treated with rubber banding's that had transient effect over the year.  When I attempted rubber banding, I found that the hemorrhoidal tissues were very flat and adhered, prohibiting rubber banding.  There was a possibility of distal rectal inflammation seen at the time of anoscopy.  The patient's symptoms have persisted.  She tried a high-fiber diet but that made symptoms worse prompting her to reduce her fiber intake.    I think my best option here is to rule out underlying ulcerative proctitis or colitis.  Intubation of the terminal ileum will be attempted.  If the disease is very specific and limited, radiofrequency ablation may be considered.  If there is apparent proctitis, biopsies will be done to confirm the diagnosis and no treatment will be given.  The patient understands this plan.  I will avoid extensive radiofrequency ablation at this visit.  Colonoscopy risks, not limited to bleeding, perforated colon, need for surgery, and missed lesions were discussed. Alternatives were discussed. Questions were answered. She agreed to the procedure.       Subjective     HPI    Chance Lindsey is a 58 y.o. female who is here for a 6 week follow up to bleeding hemorrhoids.     The patient notes advised high intake of fiber was worsening rectal bleeding. Instead of 25 g she has been doing around 10 g of fiber per day. States status of bleeding and mucus discharge per rectum now is the same as previously. Notes soft stools.     Last seen in the office on 9/5/2024; at the time it was reviewed that if symptoms persist, repeat colonoscopy with biopsies of the distal rectal tissues may be indicated. Radiofrequency ablation  could be considered to control the areas of bleeding.       She is status post laparoscopic sigmoid resection, mobilization of splenic flexure, flexible sigmoidoscopy, lysis of adhesions on 18 for diverticulitis.     Historical Information   Past Medical History:   Diagnosis Date    Abnormal Pap smear of cervix 2020    Diverticulitis     Diverticulitis of colon     Endometriosis     Hyperlipidemia     Hypertension      Past Surgical History:   Procedure Laterality Date    BOWEL RESECTION      2018    BREAST IMPLANT Bilateral      SECTION       SECTION      COLONOSCOPY      x4    EYE SURGERY      biklat eyelid surgery    FRACTURE SURGERY      right arm     HEMICOLOECTOMY W/ ANASTOMOSIS N/A 2018    Procedure: LAPAROSCOPIC SIGMOID RESECTION, MOBILIZATION OF splenic flexure;  Surgeon: YENY Michael MD;  Location: [facility];  Service: Colorectal    HYSTERECTOMY      LAPAROSCOPIC LYSIS INTESTINAL ADHESIONS      HI SIGMOIDOSCOPY FLX DX W/COLLJ SPEC BR/WA IF PFRMD N/A 2018    Procedure: SIGMOIDOSCOPY FLEXIBLE;  Surgeon: YENY Michael MD;  [facility]: [facility];  Service: Colorectal       Meds/Allergies       Current Outpatient Medications:     amoxicillin (AMOXIL) 500 mg capsule, TAKE 4 CAPSULES 1 HOUR PRIOR TO ANY DENTAL PROCEDURE, Disp: , Rfl:     ibandronate (BONIVA) 150 MG tablet, Take 150 mg by mouth every 30 (thirty) days, Disp: , Rfl:     lisinopril (ZESTRIL) 2.5 mg tablet, Take 2.5 mg by mouth daily, Disp: , Rfl:     simvastatin (ZOCOR) 10 mg tablet, Take 10 mg by mouth every evening, Disp: , Rfl:     CALCIUM-VITAMIN D PO, Take 1 tablet by mouth daily (Patient not taking: Reported on 2024), Disp: , Rfl:     hydrocortisone (ANUSOL-HC) 25 mg suppository, Insert 1 suppository (25 mg total) into the rectum daily at bedtime for 12 days, Disp: 12 suppository, Rfl: 2    meloxicam (MOBIC) 15 mg tablet, Take 15 mg by mouth daily (Patient not taking: Reported on 2024), Disp:  ", Rfl:     Multiple Vitamin (MULTIVITAMIN) tablet, Take 1 tablet by mouth daily (Patient not taking: Reported on 1/31/2024), Disp: , Rfl:     tamsulosin (FLOMAX) 0.4 mg, Take 1 capsule (0.4 mg total) by mouth daily with dinner for 14 days (Patient not taking: Reported on 1/31/2024), Disp: 14 capsule, Rfl: 0    TURMERIC CURCUMIN PO, Take by mouth (Patient not taking: Reported on 1/31/2024), Disp: , Rfl:   No Known Allergies    Social History   Social History     Substance and Sexual Activity   Drug Use No     Social History     Tobacco Use   Smoking Status Never   Smokeless Tobacco Never         Family History   Problem Relation Age of Onset    Hypertension Mother     Hypertension Father     Breast cancer Maternal Grandmother     Breast cancer Maternal Aunt         double mastectomy         Review of Systems    Objective   Current Vitals:  Vitals:    10/23/24 1442   Weight: 80.3 kg (177 lb)   Height: 5' 4\" (1.626 m)         Physical Exam  Constitutional:       Appearance: Normal appearance.   Cardiovascular:      Rate and Rhythm: Normal rate and regular rhythm.   Pulmonary:      Effort: Pulmonary effort is normal.      Breath sounds: Normal breath sounds.   Neurological:      General: No focal deficit present.      Mental Status: She is alert and oriented to person, place, and time.               "

## 2024-10-23 ENCOUNTER — OFFICE VISIT (OUTPATIENT)
Age: 59
End: 2024-10-23
Payer: COMMERCIAL

## 2024-10-23 VITALS — BODY MASS INDEX: 30.22 KG/M2 | WEIGHT: 177 LBS | HEIGHT: 64 IN

## 2024-10-23 DIAGNOSIS — K62.5 ANAL BLEEDING: Primary | ICD-10-CM

## 2024-10-23 PROCEDURE — 99213 OFFICE O/P EST LOW 20 MIN: CPT | Performed by: COLON & RECTAL SURGERY

## 2024-10-23 RX ORDER — AMOXICILLIN 500 MG/1
CAPSULE ORAL
COMMUNITY
Start: 2024-09-03

## 2024-10-23 NOTE — ASSESSMENT & PLAN NOTE
The patient has a year-long history of mucus stools and rectal bleeding.  She was treated with rubber banding's that had transient effect over the year.  When I attempted rubber banding, I found that the hemorrhoidal tissues were very flat and adhered, prohibiting rubber banding.  There was a possibility of distal rectal inflammation seen at the time of anoscopy.  The patient's symptoms have persisted.  She tried a high-fiber diet but that made symptoms worse prompting her to reduce her fiber intake.    I think my best option here is to rule out underlying ulcerative proctitis or colitis.  Intubation of the terminal ileum will be attempted.  If the disease is very specific and limited, radiofrequency ablation may be considered.  If there is apparent proctitis, biopsies will be done to confirm the diagnosis and no treatment will be given.  The patient understands this plan.  I will avoid extensive radiofrequency ablation at this visit.  Colonoscopy risks, not limited to bleeding, perforated colon, need for surgery, and missed lesions were discussed. Alternatives were discussed. Questions were answered. She agreed to the procedure.

## 2024-10-28 ENCOUNTER — TELEPHONE (OUTPATIENT)
Age: 59
End: 2024-10-28

## 2024-10-28 NOTE — TELEPHONE ENCOUNTER
OV 10/23/24, anal bleeding; Sigmoid resection done 8/24/18 w/ TR, diverticulitis; BMI 30      LVM to schedule colonoscopy.

## 2024-10-28 NOTE — LETTER
Chance César  [address]  [facility] [address]        Location:  [place] [address]    Performing Physician: OSITO Michael MD      DATE OF PROCEDURE: 11/20/2024 TIME OF PROCEDURE:                                 The OR/GI Lab will contact you the evening prior to your procedure with your exact arrival time.    We kindly ask that you immediately notify us of any need to cancel or reschedule your procedure.      WEEK BEFORE THE PROCEDURE:  Do not take Iron tablets for one week  5 days prior to the appointment AVOID vegetables and fruits with skins or seeds, nuts, corn, popcorn, and whole grain breads.  Purchase: One (1) 238-gram container of Miralax (polyethylene glycol 3350), four (4) 5 mg Dulcolax (bisacodyl) tablets, and 64-ounces of Gatorade (sports drink) - no red, orange, or purple. These may be purchased at any pharmacy without a prescription. Generic products are permissible.  Arrange responsible transportation for day of the procedure.      DAY BEFORE THE PROCEDURE:  CLEAR liquids only for entire day prior. Nothing red, orange or purple.  You MAY have:  Soda  Water  Broth Gatorade  Jell-O  Popsicles Coffee/tea without  Milk/creamer     YOU MAY NOT HAVE:  Solid foods  Milk and milk products  Juice with pulp              BOWEL PREPARATION: Includes: One (1) 238-gram container of Miralax (polyethylene glycol 3350), four (4) 5 mg Dulcolax (bisacodyl) tablets, and 64-ounces of Gatorade (sports drink). Preparation may be refrigerated. Entire bowel prep should be completed.    Afternoon before the procedure (2:00 pm - 5:00 pm):  Take two (2) 5 mg Dulcolax laxative tablets.    Evening before the procedure (6:00 pm):  Mix entire container of Miralax with 64-ounces of Gatorade and shake until all medication is dissolved.  Begin drinking solution. Drink an eight (8) ounce cup every 10-15 minutes until you have consumed half (32 ounces) of the solution. Refrigerate  remaining solution.    Night before the procedure (8:00 pm):  Take two (2) 5 mg Dulcolax laxative tablets.    Beginning 5 hours before your procedure:  Drink the remaining amount of prepared solution (32 ounces). Drink an eight (8) ounce cup every 10-15 minutes until you have consumed the remaining solution.      Bowel prep should be completed 4 hours prior to procedure time.  NOTHING TO EAT OR DRINK AFTER MIDNIGHT -EXCEPT YOUR BOWEL PREP                                                                                                                                                                                DAY OF THE PROCEDURE:  You may brush your teeth.  Leave all jewelry at home. Take out any facial piercings, if able.  Please arrive for your procedure as indicated by the OR / GI Lab / Endoscopy Unit. The hospital will contact you the day before with your exact arrival time.  Make sure you have arranged ahead of time for a responsible adult (18 or older) to accompany and drive you home after the procedure. Please discuss any transportation concerns with our staff prior to your procedure.  The effects of the anesthesia can persist for 24 hours. After receiving the sedation, you must exercise caution before engaging in any activity that could harm yourself and others (such as driving a car). Do not make any important decisions or do not drink any alcoholic beverages during this time period.  After your procedure, you may have anything you’d like to eat or drink. You will probably want to start with something light. Please include plenty of fluids. Avoid items that cause gas such as sodas and salads.      SPECIAL INSTRUCTIONS:    For patients currently taking blood thinners and/or antiplatelet therapy our office will contact the prescribing provider. Our office will contact you with any required changes to your medication regimen.  Blood thinner (i.e. - Coumadin, Pradaxa, Lovenox, Xarelto,  Eliquis)  N/A  Antiplatelet (i.e. - Plavix, Aggrenox, Effient, Brilinta)  N/A          Diabetes:  If you are Diabetic, please see separate Diabetic Instruction Sheet.  Prescribed medications:  Do not stop your aspirin, or any of your other medications (unless instructed otherwise).  Take the rest of your prescribed medications with small sips of water at least 2 hours prior to your procedure.      NOTHING TO EAT OR DRINK (INCLUDING CHEWING GUM) AFTER 12 MIDNIGHT PRIOR TO YOUR PROCEDURE EXCEPT YOUR BOWEL PREP.        For any questions or concerns related to your bowel preparation or pre-procedure instructions, please contact our office.  Thank you for choosing [facility]’s Colon & Rectal Surgery!    Revised 1/2023    [phone]

## 2024-10-29 ENCOUNTER — PREP FOR PROCEDURE (OUTPATIENT)
Age: 59
End: 2024-10-29

## 2024-10-29 DIAGNOSIS — K62.5 ANAL BLEEDING: Primary | ICD-10-CM

## 2024-11-20 ENCOUNTER — ANESTHESIA EVENT (OUTPATIENT)
Dept: GASTROENTEROLOGY | Facility: HOSPITAL | Age: 59
End: 2024-11-20
Payer: COMMERCIAL

## 2024-11-20 ENCOUNTER — HOSPITAL ENCOUNTER (OUTPATIENT)
Dept: GASTROENTEROLOGY | Facility: HOSPITAL | Age: 59
Setting detail: OUTPATIENT SURGERY
Discharge: HOME | End: 2024-11-20
Attending: COLON & RECTAL SURGERY
Payer: COMMERCIAL

## 2024-11-20 ENCOUNTER — ANESTHESIA (OUTPATIENT)
Dept: GASTROENTEROLOGY | Facility: HOSPITAL | Age: 59
End: 2024-11-20
Payer: COMMERCIAL

## 2024-11-20 VITALS
OXYGEN SATURATION: 100 % | BODY MASS INDEX: 30.22 KG/M2 | RESPIRATION RATE: 16 BRPM | WEIGHT: 177 LBS | SYSTOLIC BLOOD PRESSURE: 106 MMHG | HEART RATE: 69 BPM | TEMPERATURE: 97.4 F | DIASTOLIC BLOOD PRESSURE: 55 MMHG | HEIGHT: 64 IN

## 2024-11-20 DIAGNOSIS — K51.20 ULCERATIVE PROCTITIS WITHOUT COMPLICATION (HCC): Primary | ICD-10-CM

## 2024-11-20 DIAGNOSIS — K62.5 ANAL BLEEDING: ICD-10-CM

## 2024-11-20 PROCEDURE — 88305 TISSUE EXAM BY PATHOLOGIST: CPT | Performed by: STUDENT IN AN ORGANIZED HEALTH CARE EDUCATION/TRAINING PROGRAM

## 2024-11-20 PROCEDURE — 45380 COLONOSCOPY AND BIOPSY: CPT | Performed by: COLON & RECTAL SURGERY

## 2024-11-20 RX ORDER — MESALAMINE 1000 MG/1
1000 SUPPOSITORY RECTAL
Qty: 30 SUPPOSITORY | Refills: 6 | Status: SHIPPED | OUTPATIENT
Start: 2024-11-20

## 2024-11-20 RX ORDER — SODIUM CHLORIDE, SODIUM LACTATE, POTASSIUM CHLORIDE, CALCIUM CHLORIDE 600; 310; 30; 20 MG/100ML; MG/100ML; MG/100ML; MG/100ML
INJECTION, SOLUTION INTRAVENOUS CONTINUOUS PRN
Status: DISCONTINUED | OUTPATIENT
Start: 2024-11-20 | End: 2024-11-20

## 2024-11-20 RX ORDER — PROPOFOL 10 MG/ML
INJECTION, EMULSION INTRAVENOUS AS NEEDED
Status: DISCONTINUED | OUTPATIENT
Start: 2024-11-20 | End: 2024-11-20

## 2024-11-20 RX ADMIN — PROPOFOL 50 MG: 10 INJECTION, EMULSION INTRAVENOUS at 10:24

## 2024-11-20 RX ADMIN — PROPOFOL 50 MG: 10 INJECTION, EMULSION INTRAVENOUS at 10:16

## 2024-11-20 RX ADMIN — PROPOFOL 150 MG: 10 INJECTION, EMULSION INTRAVENOUS at 10:09

## 2024-11-20 RX ADMIN — SODIUM CHLORIDE, SODIUM LACTATE, POTASSIUM CHLORIDE, AND CALCIUM CHLORIDE: .6; .31; .03; .02 INJECTION, SOLUTION INTRAVENOUS at 09:55

## 2024-11-20 RX ADMIN — PROPOFOL 50 MG: 10 INJECTION, EMULSION INTRAVENOUS at 10:29

## 2024-11-20 RX ADMIN — PROPOFOL 50 MG: 10 INJECTION, EMULSION INTRAVENOUS at 10:20

## 2024-11-20 RX ADMIN — PROPOFOL 50 MG: 10 INJECTION, EMULSION INTRAVENOUS at 10:12

## 2024-11-20 NOTE — ANESTHESIA PREPROCEDURE EVALUATION
Procedure:  COLONOSCOPY    Relevant Problems   CARDIO   (+) Bleeding hemorrhoids   (+) Hemorrhoids      GI/HEPATIC   (+) Anal bleeding   (+) Bleeding hemorrhoids   (+) Small bowel obstruction (HCC)        Physical Exam    Airway    Mallampati score: II  TM Distance: >3 FB  Neck ROM: full     Dental       Cardiovascular  Cardiovascular exam normal    Pulmonary  Pulmonary exam normal     Other Findings  post-pubertal.      Anesthesia Plan  ASA Score- 2     Anesthesia Type- IV sedation with anesthesia with ASA Monitors.         Additional Monitors:     Airway Plan:            Plan Factors-    Chart reviewed. EKG reviewed.  Existing labs reviewed. Patient summary reviewed.                  Induction- intravenous.    Postoperative Plan-         Informed Consent- Anesthetic plan and risks discussed with patient.  I personally reviewed this patient with the CRNA. Discussed and agreed on the Anesthesia Plan with the CRNA..

## 2024-11-20 NOTE — ANESTHESIA POSTPROCEDURE EVALUATION
Post-Op Assessment Note    CV Status:  Stable  Pain Score: 0    Pain management: adequate       Mental Status:  Arousable and sleepy   Hydration Status:  Stable   PONV Controlled:  Controlled   Airway Patency:  Patent     Post Op Vitals Reviewed: Yes    No anethesia notable event occurred.    Staff: CRNA           Last Filed PACU Vitals:  Vitals Value Taken Time   Temp     Pulse 70    /62    Resp 14    SpO2 99        Modified Meghna:  Activity: 2 (11/20/2024  9:38 AM)  Respiration: 2 (11/20/2024  9:38 AM)  Circulation: 2 (11/20/2024  9:38 AM)  Consciousness: 2 (11/20/2024  9:38 AM)  Oxygen Saturation: 2 (11/20/2024  9:38 AM)  Modified Meghna Score: 10 (11/20/2024  9:38 AM)

## 2024-11-20 NOTE — INTERVAL H&P NOTE
H&P reviewed. After examining the patient I find no changes in the patients condition since the H&P had been written.    Vitals:    11/20/24 0952   BP: 118/66   Pulse: 81   Resp: 16   Temp: 98.9 °F (37.2 °C)   SpO2: 97%

## 2024-11-25 PROCEDURE — 88305 TISSUE EXAM BY PATHOLOGIST: CPT | Performed by: STUDENT IN AN ORGANIZED HEALTH CARE EDUCATION/TRAINING PROGRAM

## 2024-11-26 ENCOUNTER — RESULTS FOLLOW-UP (OUTPATIENT)
Dept: OTHER | Facility: HOSPITAL | Age: 59
End: 2024-11-26

## 2025-02-13 ENCOUNTER — TELEPHONE (OUTPATIENT)
Age: 60
End: 2025-02-13

## 2025-02-13 NOTE — TELEPHONE ENCOUNTER
Received refill request for mesalamine suppositories. LM for patient stating Dr. Michael requested she follow up at the time of her colonoscopy on 11/20/24. Patient needs to make a follow up at her convenience.

## 2025-06-11 PROBLEM — H02.839 DERMATOCHALASIS: Status: ACTIVE | Noted: 2025-06-11

## 2025-06-11 PROBLEM — H57.819 BROW PTOSIS: Status: ACTIVE | Noted: 2025-06-11

## 2025-06-16 NOTE — PRE-PROCEDURE INSTRUCTIONS
Pre-Surgery Instructions:   Medication Instructions    ibandronate (BONIVA) 150 MG tablet Hold day of surgery.    lisinopril (ZESTRIL) 2.5 mg tablet Hold day of surgery.   Medication instructions for day of surgery reviewed. Please take all instructed medications with only a sip of water. Please do not take any over the counter (non-prescribed) vitamins or supplements for one week prior to date of surgery.      You will receive a call one business day prior to surgery with an arrival time and hospital directions. If your surgery is scheduled on a Monday, the hospital will be calling you on the Friday prior to your surgery. If you have not heard from anyone by 8pm, please call the hospital supervisor through the hospital  at [phone]. ([place] [phone] or [place] [phone]).    Do not eat or drink anything after midnight the night before your surgery, including candy, mints, lifesavers, or chewing gum. Do not drink alcohol 24hrs before your surgery. Try not to smoke at least 24hrs before your surgery.       Follow the pre surgery showering instructions as listed in the “My Surgical Experience Booklet” or otherwise provided by your surgeon's office. Do not use a blade to shave the surgical area 1 week before surgery. It is okay to use a clean electric clippers up to 24 hours before surgery. Do not apply any lotions, creams, including makeup, cologne, deodorant, or perfumes after showering on the day of your surgery. Do not use dry shampoo, hair spray, hair gel, or any type of hair products.     No contact lenses, eye make-up, or artificial eyelashes. Remove nail polish, including gel polish, and any artificial, gel, or acrylic nails if possible. Remove all jewelry including rings and body piercing jewelry.     Wear causal clothing that is easy to take on and off. Consider your type of surgery.    Keep any valuables, jewelry, piercings at home. Please bring any specially ordered equipment (sling,  braces) if indicated.    Arrange for a responsible person to drive you to and from the hospital on the day of your surgery. Please confirm the visitor policy for the day of your procedure when you receive your phone call with an arrival time.     Call the surgeon's office with any new illnesses, exposures, or additional questions prior to surgery.    Please reference your “My Surgical Experience Booklet” for additional information to prepare for your upcoming surgery.

## 2025-06-23 ENCOUNTER — ANESTHESIA EVENT (OUTPATIENT)
Dept: PERIOP | Facility: HOSPITAL | Age: 60
End: 2025-06-23
Payer: SELF-PAY

## 2025-06-23 NOTE — ANESTHESIA PREPROCEDURE EVALUATION
Procedure:  BLEPHAROPLASTY LOWER (Bilateral: Eye)  BLEPHAROPLASTY UPPER (Bilateral: Eye)  BROWPLASTY/ BROWLIFT (Eye)    TTE 2023: LVEF 55-60%, RV wnl  ECG 2018: NSR    HTN - lisinopril  Relevant Problems   CARDIO   (+) Bleeding hemorrhoids   (+) Hemorrhoids      GI/HEPATIC   (+) Anal bleeding   (+) Bleeding hemorrhoids   (+) Small bowel obstruction (HCC)        Physical Exam    Airway       Cardiovascular      Dental       Pulmonary      Neurological      Other Findings  post-pubertal.      Anesthesia Plan  ASA Score- 2     Anesthesia Type- general with ASA Monitors.         Additional Monitors:     Airway Plan: LMA and LMA.    Comment: LMA vs ETT.       Plan Factors-    Chart reviewed. EKG reviewed. Imaging results reviewed. Existing labs reviewed. Patient summary reviewed.                  Induction-     Postoperative Plan-         Informed Consent-       NPO Status:  No vitals data found for the desired time range.

## 2025-06-23 NOTE — ANESTHESIA PREPROCEDURE EVALUATION
Procedure:  BLEPHAROPLASTY LOWER (Bilateral: Eye)  BLEPHAROPLASTY UPPER (Bilateral: Eye)  BROWPLASTY/ BROWLIFT (Eye)    TTE 2023: LVEF 55-60%, RV wnl  ECG 2018: NSR    HTN - lisinopril  Relevant Problems   CARDIO   (+) Bleeding hemorrhoids   (+) Hemorrhoids      GI/HEPATIC   (+) Anal bleeding   (+) Bleeding hemorrhoids   (+) Small bowel obstruction (HCC)             Anesthesia Plan  ASA Score- 2     Anesthesia Type- general with ASA Monitors.         Additional Monitors:     Airway Plan: LMA and LMA.    Comment: LMA vs ETT.       Plan Factors-    Chart reviewed. EKG reviewed. Imaging results reviewed. Existing labs reviewed. Patient summary reviewed.                  Induction-     Postoperative Plan-         Informed Consent-       NPO Status:  No vitals data found for the desired time range.

## 2025-06-25 ENCOUNTER — HOSPITAL ENCOUNTER (OUTPATIENT)
Facility: HOSPITAL | Age: 60
Setting detail: OUTPATIENT SURGERY
Discharge: HOME | End: 2025-06-25
Attending: SURGERY | Admitting: SURGERY
Payer: SELF-PAY

## 2025-06-25 ENCOUNTER — ANESTHESIA (OUTPATIENT)
Dept: PERIOP | Facility: HOSPITAL | Age: 60
End: 2025-06-25
Payer: SELF-PAY

## 2025-06-25 VITALS
BODY MASS INDEX: 30.22 KG/M2 | WEIGHT: 177 LBS | TEMPERATURE: 97.6 F | HEART RATE: 104 BPM | SYSTOLIC BLOOD PRESSURE: 153 MMHG | DIASTOLIC BLOOD PRESSURE: 88 MMHG | RESPIRATION RATE: 16 BRPM | OXYGEN SATURATION: 91 % | HEIGHT: 64 IN

## 2025-06-25 RX ORDER — EPHEDRINE SULFATE 50 MG/ML
INJECTION INTRAVENOUS AS NEEDED
Status: DISCONTINUED | OUTPATIENT
Start: 2025-06-25 | End: 2025-06-25

## 2025-06-25 RX ORDER — SODIUM CHLORIDE, SODIUM LACTATE, POTASSIUM CHLORIDE, CALCIUM CHLORIDE 600; 310; 30; 20 MG/100ML; MG/100ML; MG/100ML; MG/100ML
INJECTION, SOLUTION INTRAVENOUS CONTINUOUS PRN
Status: DISCONTINUED | OUTPATIENT
Start: 2025-06-25 | End: 2025-06-25

## 2025-06-25 RX ORDER — PROPOFOL 10 MG/ML
INJECTION, EMULSION INTRAVENOUS AS NEEDED
Status: DISCONTINUED | OUTPATIENT
Start: 2025-06-25 | End: 2025-06-25

## 2025-06-25 RX ORDER — MIDAZOLAM HYDROCHLORIDE 2 MG/2ML
INJECTION, SOLUTION INTRAMUSCULAR; INTRAVENOUS AS NEEDED
Status: DISCONTINUED | OUTPATIENT
Start: 2025-06-25 | End: 2025-06-25

## 2025-06-25 RX ORDER — MINERAL OIL AND PETROLATUM 150; 830 MG/G; MG/G
OINTMENT OPHTHALMIC AS NEEDED
Status: DISCONTINUED | OUTPATIENT
Start: 2025-06-25 | End: 2025-06-25 | Stop reason: HOSPADM

## 2025-06-25 RX ORDER — HYDROCODONE BITARTRATE AND ACETAMINOPHEN 5; 325 MG/1; MG/1
2 TABLET ORAL EVERY 4 HOURS PRN
Refills: 0 | Status: DISCONTINUED | OUTPATIENT
Start: 2025-06-25 | End: 2025-06-25 | Stop reason: HOSPADM

## 2025-06-25 RX ORDER — LIDOCAINE HYDROCHLORIDE AND EPINEPHRINE 10; 10 MG/ML; UG/ML
INJECTION, SOLUTION INFILTRATION; PERINEURAL AS NEEDED
Status: DISCONTINUED | OUTPATIENT
Start: 2025-06-25 | End: 2025-06-25 | Stop reason: HOSPADM

## 2025-06-25 RX ORDER — ONDANSETRON 2 MG/ML
INJECTION INTRAMUSCULAR; INTRAVENOUS AS NEEDED
Status: DISCONTINUED | OUTPATIENT
Start: 2025-06-25 | End: 2025-06-25

## 2025-06-25 RX ORDER — DEXAMETHASONE SODIUM PHOSPHATE 10 MG/ML
INJECTION, SOLUTION INTRAMUSCULAR; INTRAVENOUS AS NEEDED
Status: DISCONTINUED | OUTPATIENT
Start: 2025-06-25 | End: 2025-06-25

## 2025-06-25 RX ORDER — BALANCED SALT SOLUTION 6.4; .75; .48; .3; 3.9; 1.7 MG/ML; MG/ML; MG/ML; MG/ML; MG/ML; MG/ML
SOLUTION OPHTHALMIC AS NEEDED
Status: DISCONTINUED | OUTPATIENT
Start: 2025-06-25 | End: 2025-06-25 | Stop reason: HOSPADM

## 2025-06-25 RX ORDER — FENTANYL CITRATE/PF 50 MCG/ML
25 SYRINGE (ML) INJECTION
Status: DISCONTINUED | OUTPATIENT
Start: 2025-06-25 | End: 2025-06-25 | Stop reason: HOSPADM

## 2025-06-25 RX ORDER — CEFAZOLIN SODIUM 2 G/50ML
2000 SOLUTION INTRAVENOUS ONCE
Status: COMPLETED | OUTPATIENT
Start: 2025-06-25 | End: 2025-06-25

## 2025-06-25 RX ORDER — NEOMYCIN SULFATE, POLYMYXIN B SULFATE, AND DEXAMETHASONE 3.5; 10000; 1 MG/G; [USP'U]/G; MG/G
OINTMENT OPHTHALMIC AS NEEDED
Status: DISCONTINUED | OUTPATIENT
Start: 2025-06-25 | End: 2025-06-25 | Stop reason: HOSPADM

## 2025-06-25 RX ORDER — FENTANYL CITRATE 50 UG/ML
INJECTION, SOLUTION INTRAMUSCULAR; INTRAVENOUS AS NEEDED
Status: DISCONTINUED | OUTPATIENT
Start: 2025-06-25 | End: 2025-06-25

## 2025-06-25 RX ORDER — ONDANSETRON 2 MG/ML
4 INJECTION INTRAMUSCULAR; INTRAVENOUS ONCE AS NEEDED
Status: DISCONTINUED | OUTPATIENT
Start: 2025-06-25 | End: 2025-06-25 | Stop reason: HOSPADM

## 2025-06-25 RX ORDER — HYDROMORPHONE HCL/PF 1 MG/ML
SYRINGE (ML) INJECTION AS NEEDED
Status: DISCONTINUED | OUTPATIENT
Start: 2025-06-25 | End: 2025-06-25

## 2025-06-25 RX ORDER — KETAMINE HCL IN NACL, ISO-OSM 100MG/10ML
SYRINGE (ML) INJECTION AS NEEDED
Status: DISCONTINUED | OUTPATIENT
Start: 2025-06-25 | End: 2025-06-25

## 2025-06-25 RX ORDER — ONDANSETRON 2 MG/ML
4 INJECTION INTRAMUSCULAR; INTRAVENOUS ONCE AS NEEDED
Status: COMPLETED | OUTPATIENT
Start: 2025-06-25 | End: 2025-06-25

## 2025-06-25 RX ORDER — HYDROMORPHONE HCL IN WATER/PF 6 MG/30 ML
0.2 PATIENT CONTROLLED ANALGESIA SYRINGE INTRAVENOUS
Status: DISCONTINUED | OUTPATIENT
Start: 2025-06-25 | End: 2025-06-25 | Stop reason: HOSPADM

## 2025-06-25 RX ORDER — HYDROCODONE BITARTRATE AND ACETAMINOPHEN 5; 325 MG/1; MG/1
1 TABLET ORAL EVERY 4 HOURS PRN
Refills: 0 | Status: DISCONTINUED | OUTPATIENT
Start: 2025-06-25 | End: 2025-06-25 | Stop reason: HOSPADM

## 2025-06-25 RX ADMIN — MIDAZOLAM 2 MG: 1 INJECTION INTRAMUSCULAR; INTRAVENOUS at 07:16

## 2025-06-25 RX ADMIN — HYDROMORPHONE HYDROCHLORIDE 0.5 MG: 1 INJECTION, SOLUTION INTRAMUSCULAR; INTRAVENOUS; SUBCUTANEOUS at 08:52

## 2025-06-25 RX ADMIN — DEXAMETHASONE SODIUM PHOSPHATE 10 MG: 10 INJECTION, SOLUTION INTRAMUSCULAR; INTRAVENOUS at 07:26

## 2025-06-25 RX ADMIN — ONDANSETRON 4 MG: 2 INJECTION INTRAMUSCULAR; INTRAVENOUS at 12:04

## 2025-06-25 RX ADMIN — FENTANYL CITRATE 25 MCG: 50 INJECTION, SOLUTION INTRAMUSCULAR; INTRAVENOUS at 08:28

## 2025-06-25 RX ADMIN — EPHEDRINE SULFATE 10 MG: 50 INJECTION INTRAVENOUS at 07:50

## 2025-06-25 RX ADMIN — FENTANYL CITRATE 25 MCG: 50 INJECTION, SOLUTION INTRAMUSCULAR; INTRAVENOUS at 08:10

## 2025-06-25 RX ADMIN — Medication 20 MG: at 07:27

## 2025-06-25 RX ADMIN — FENTANYL CITRATE 25 MCG: 50 INJECTION, SOLUTION INTRAMUSCULAR; INTRAVENOUS at 07:35

## 2025-06-25 RX ADMIN — CEFAZOLIN SODIUM 2000 MG: 2 SOLUTION INTRAVENOUS at 07:25

## 2025-06-25 RX ADMIN — ONDANSETRON 4 MG: 2 INJECTION INTRAMUSCULAR; INTRAVENOUS at 07:25

## 2025-06-25 RX ADMIN — SODIUM CHLORIDE, SODIUM LACTATE, POTASSIUM CHLORIDE, AND CALCIUM CHLORIDE: .6; .31; .03; .02 INJECTION, SOLUTION INTRAVENOUS at 10:18

## 2025-06-25 RX ADMIN — FENTANYL CITRATE 25 MCG: 50 INJECTION, SOLUTION INTRAMUSCULAR; INTRAVENOUS at 07:33

## 2025-06-25 RX ADMIN — PROPOFOL 200 MG: 10 INJECTION, EMULSION INTRAVENOUS at 07:23

## 2025-06-25 RX ADMIN — SODIUM CHLORIDE, SODIUM LACTATE, POTASSIUM CHLORIDE, AND CALCIUM CHLORIDE: .6; .31; .03; .02 INJECTION, SOLUTION INTRAVENOUS at 07:23

## 2025-06-25 NOTE — OP NOTE
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  Marked in the sitting position.  Patient taken back to the operative suite.  Placed supine position on the bed.  Patient was intubated by anesthesia.  Patient was then prepped and draped in sterile surgical fashion.  We first started with the upper eyes.  Patient had a prior upper lid blepharoplasty and had significant asymmetries of the eyes with redundant skin and residual medial fat pads.  We marked out the upper eyes injected them with 1% lidocaine with epinephrine.  Skin only incision was made with 15 blade.  The excess skin was removed.  At this point we then opened up the medial and middle fat pad and removed a small amount of orbital fat.  Hemostasis was obtained.  We reapproximated the orbicularis oculi muscle with 5-0 Monocryl suture.  The skin was closed with 6-0 Prolene interrupted sutures.  This was done on both left and right upper eyelids.  We then turned our attention to the lower eyes.   Bilateral subciliary incisions were made with a 15 blade.  Skin muscle flap was elevated using electrocautery.  Dissection occurred down just along the orbital septum to the infraorbital rim.  The lower arcuate line was elevated using electrocautery.  We then went ahead and removed a significant amount of fat from the middle medial and lateral fat pads.  We went ahead and repaired the small openings of the orbital septum with 5-0 Monocryl suture.  The excess skin was then pulled superiorly redraped excess skin was then marked off.  2 mm excision of skin and muscle was performed.  We went ahead and anchored the orbicularis oculi muscle onto the lateral canthal periosteum with 4-0 PDS suture.  This was done on both left and right lower eyelids.  Hemostasis was obtained.  We went ahead and reapproximated the muscle with 5-0 Monocryl suture.  The skin was closed with 6-0 chromic suture.  The same procedure was performed on the right and left lower eyelids.  At this point we turned our attention to the brow.  An anterior hairline incision was made with a 15 blade after injecting the entire brow area.  A skin only elevation of the brow was performed.  This is taken only down to the supraorbital rim as well as the radix.  The glabella muscle was identified and was removed with electrocautery.  At this point hemostasis was obtained.  We then pulled the brow superiorly and laterally.  Excess skin was then marked off and removed with a 15 blade and electrocautery.  We went ahead and closed our incision line with deep 3-0 PDS, 4-0 PDS, running 6-0 Prolene suture.  We had good symmetry after closure.  Patient was then wrapped in a sterile head wrap, awakened surgery taken recovery in stable condition.    All counts were correct x 2.       I was present for the entire procedure.    Patient Disposition:  PACU          SIGNATURE: Baldev Castro MD  DATE: June 25, 2025  TIME: 10:38 AM

## 2025-06-25 NOTE — ANESTHESIA POSTPROCEDURE EVALUATION
Post-Op Assessment Note    CV Status:  Stable    Pain management: adequate       Mental Status:  Alert and awake   Hydration Status:  Euvolemic   PONV Controlled:  Controlled   Airway Patency:  Patent     Post Op Vitals Reviewed: Yes    No anethesia notable event occurred.    Staff: Anesthesiologist, with CRNAs           Last Filed PACU Vitals:  Vitals Value Taken Time   Temp 97.8 °F (36.6 °C) 06/25/25 10:26   Pulse 113 06/25/25 11:01   /75 06/25/25 10:59   Resp 15 06/25/25 11:01   SpO2 89 % 06/25/25 11:00   Vitals shown include unfiled device data.    Modified Meghna:     Vitals Value Taken Time   Activity 2 06/25/25 10:46   Respiration 2 06/25/25 10:46   Circulation 2 06/25/25 10:46   Consciousness 2 06/25/25 10:46   Oxygen Saturation 2 06/25/25 10:46     Modified Meghna Score: 10

## 2025-06-25 NOTE — DISCHARGE INSTR - AVS FIRST PAGE
[facility]     [facility], [address] [place] [address], [address], [place] [address]   P [phone]/F [phone]/ [email or web address]     Home Instructions for the following procedures: Facelift, Necklift, Browlift     Please call the office today to schedule a post operative appointment, and tell the office staff  that you doctor needs see you in our office in 7-10 days.    No smoking.    No aspiring or medication containing aspirin for a period for two weeks following surgery.    Keep ice on the wound for 48 hours (20 minutes on, 20 minutes off). A bag of frozen vegetables works great.    No excessive sun exposure for 6 weeks after surgery. The use of protective sunscreen lotions thereafter at all times will be necessary.    You may remove all head dressings the day after surgery. If you are coming to the office, it is recommended that you bring a headscarf with you.    You may shower and wash your hair the next day unless instructed otherwise by your doctor. Do not color or perm your hair for 4 weeks post surgery. Some areas of your scalp may be numb. Hair dryers should be kept on a cool setting to avoid being burned.    Gently move your neck from side to side and avoid any sudden movements.    Do not bend, lit or strain for 2 weeks postoperatively. try to keep your head elevated at all times.    Your swelling will increase for the first 48 hours and the will gradually subside. You may notice increased swelling in the morning; however, this will subside at the day goes on.    You might experience some tightness around the neck area, this is expected.    Slight signs of blood may show through the dressing. This is expected. Contact our office immediately if excessive swelling develops or if the dressings seem to tight.    Do not apply heat to the neck or face after surgery.    You will be given a prescription for a pain medicine. You can use extra strength Tylenol,  Advil or Aleve as a substitute.    We have spent considerable time and effort to make your surgical experience as efficient and pleasant as possible. We would appreciate your suggestions regarding any area of your care, which you think, could be improved to make your experience more pleasant.    If you have any questions, please call the office between 9:00 A.M. and 5:00 P.M.     If a problem should arise after hours, the doctor can be reached through the answering service at [phone]

## 2025-06-25 NOTE — ANESTHESIA PREPROCEDURE EVALUATION
Procedure:  BLEPHAROPLASTY LOWER (Bilateral: Eye)  BLEPHAROPLASTY UPPER (Bilateral: Eye)  BROWPLASTY/ BROWLIFT (Eye)  TTE 2023: LVEF 55-60%, RV wnl  ECG 2018: NSR     HTN - lisinopril    Denies the following: CP/SOB with exertion, asthma, COPD, SHAISTA, stroke/TIA, seizure    Relevant Problems   CARDIO   (+) Bleeding hemorrhoids   (+) Hemorrhoids      GI/HEPATIC   (+) Anal bleeding   (+) Bleeding hemorrhoids   (+) Small bowel obstruction (HCC)        Physical Exam    Airway     Mallampati score: I  TM Distance: >3 FB  Neck ROM: full  Mouth opening: >= 4 cm      Cardiovascular      Dental   No notable dental hx     Pulmonary      Neurological      Other Findings  post-pubertal.      Anesthesia Plan  ASA Score- 2     Anesthesia Type- general with ASA Monitors.         Additional Monitors:     Airway Plan: LMA and LMA.           Plan Factors-Exercise tolerance (METS): >4 METS.    Chart reviewed. EKG reviewed. Imaging results reviewed. Existing labs reviewed. Patient summary reviewed.    Patient is not a current smoker.              Induction- intravenous.    Postoperative Plan- .   Monitoring Plan - Monitoring plan - standard ASA monitoring          Informed Consent- Anesthetic plan and risks discussed with patient.  I personally reviewed this patient with the CRNA. Discussed and agreed on the Anesthesia Plan with the CRNA..      NPO Status:  Vitals Value Taken Time   Date of last liquid 06/24/25 06/25/25 06:16   Time of last liquid 2000 06/25/25 06:16   Date of last solid 06/24/25 06/25/25 06:16   Time of last solid 2000 06/25/25 06:16

## 2025-06-25 NOTE — NURSING NOTE
Pt in no distress. Verbalized understanding to AVS instructions. Pt able to tolerate oral intake. Reports minimal pain. IV removed.

## 2025-06-25 NOTE — INTERVAL H&P NOTE
H&P reviewed. After examining the patient I find no changes in the patients condition since the H&P had been written.    Vitals:    06/25/25 0612   BP: 145/92   Pulse: 90   Resp: 18   Temp: (!) 97 °F (36.1 °C)   SpO2: 96%

## 2025-06-28 ENCOUNTER — HOSPITAL ENCOUNTER (EMERGENCY)
Facility: HOSPITAL | Age: 60
Discharge: HOME | End: 2025-06-28
Attending: EMERGENCY MEDICINE | Admitting: EMERGENCY MEDICINE
Payer: COMMERCIAL

## 2025-06-28 VITALS
TEMPERATURE: 98 F | OXYGEN SATURATION: 98 % | RESPIRATION RATE: 18 BRPM | SYSTOLIC BLOOD PRESSURE: 174 MMHG | HEART RATE: 107 BPM | DIASTOLIC BLOOD PRESSURE: 94 MMHG

## 2025-06-28 DIAGNOSIS — H11.422 CHEMOSIS OF LEFT CONJUNCTIVA: Primary | ICD-10-CM

## 2025-06-28 PROCEDURE — 99284 EMERGENCY DEPT VISIT MOD MDM: CPT | Performed by: EMERGENCY MEDICINE

## 2025-06-28 PROCEDURE — 99283 EMERGENCY DEPT VISIT LOW MDM: CPT

## 2025-06-28 RX ORDER — ERYTHROMYCIN 5 MG/G
0.5 OINTMENT OPHTHALMIC ONCE
Status: COMPLETED | OUTPATIENT
Start: 2025-06-28 | End: 2025-06-28

## 2025-06-28 RX ORDER — ERYTHROMYCIN 5 MG/G
OINTMENT OPHTHALMIC
Qty: 3.5 G | Refills: 0 | Status: SHIPPED | OUTPATIENT
Start: 2025-06-28

## 2025-06-28 RX ADMIN — ERYTHROMYCIN 0.5 INCH: 5 OINTMENT OPHTHALMIC at 13:35

## 2025-06-28 NOTE — DISCHARGE INSTRUCTIONS
As we discussed I feel this is happening as your eyes were not completely shutting when you sleep.  Please use a sleeping mask please place erythromycin ars ointment on eyes prior to sleep and 3 times, a day throughout the day

## 2025-06-28 NOTE — ED PROVIDER NOTES
Time reflects when diagnosis was documented in both MDM as applicable and the Disposition within this note       Time User Action Codes Description Comment    6/28/2025  1:24 PM Richard Llanos Add [H11.422] Chemosis of left conjunctiva           ED Disposition       ED Disposition   Discharge    Condition   Stable    Date/Time   Sat Jun 28, 2025  1:23 PM    Comment   Chance Schroedernba discharge to home/self care.                   Assessment & Plan       Medical Decision Making        Initial ED assessment:   59-year-old female, presents with left eye chemosis, was tachycardic in triage but admits that she was very anxious heart rate in the 70s at my time of examination.  No signs of infection    Pathology at risk for includes but is not limited to:    Likely from her unable to close her eyelids completely.,     Initial ED plan:    Pictures taken and entered into the chart.,  No sign of infection, patient's surgeon is offline on [employer] secure chat, I do not feel need to contact on-call provider as there is no sign of infection, will discharge with close follow-up       Final ED summary/disposition:   After evaluation and workup in the emergency department, patient discharged, given erythromycin ointment.,  Strict turn parameters signs of infection which she does not have now    Risk  Prescription drug management.             Medications   erythromycin (ILOTYCIN) 0.5 % ophthalmic ointment 0.5 inch (0.5 inches Both Eyes Given 6/28/25 1335)       ED Risk Strat Scores                    No data recorded                            History of Present Illness       Chief Complaint   Patient presents with    Medical Problem     Patient had cosmetic surgery done on both eyelids and now has swelling, redness, and drainage on left eyelid. Denies pain or visual changes.        Past Medical History[1]   Past Surgical History[2]   Family History[3]   Social History[4]   E-Cigarette/Vaping    E-Cigarette Use Never User        E-Cigarette/Vaping Substances    Nicotine No     THC No     CBD No     Flavoring No     Other No     Unknown No       I have reviewed and agree with the history as documented.     59-year-old female, had a cosmetic bilateral blepharoplasty 3 days prior, patient has noticed swelling of her left conjunctiva and sclera.  States it is completely painless, but cosmetically disturbing and she is concerned so she came here for evaluation.  No fevers no chills no drainage from suture sites.  Otherwise feels well.  No falls or injury.  No blurry vision no double vision      Medical Problem  Associated symptoms: no chest pain, no fever, no headaches, no rash and no shortness of breath        Review of Systems   Constitutional:  Negative for fever.   Respiratory:  Negative for chest tightness and shortness of breath.    Cardiovascular:  Negative for chest pain.   Skin:  Negative for rash.   Neurological:  Negative for dizziness, light-headedness and headaches.           Objective       ED Triage Vitals [06/28/25 1241]   Temperature Pulse Blood Pressure Respirations SpO2 Patient Position - Orthostatic VS   98 °F (36.7 °C) (!) 107 (!) 174/94 18 98 % Sitting      Temp Source Heart Rate Source BP Location FiO2 (%) Pain Score    Oral Monitor Left arm -- --      Vitals      Date and Time Temp Pulse SpO2 Resp BP Pain Score FACES Pain Rating User   06/28/25 1241 98 °F (36.7 °C) 107 98 % 18 174/94 -- -- MD            Physical Exam  Vitals reviewed.   Constitutional:       General: She is not in acute distress.     Appearance: She is well-developed. She is not diaphoretic.   HENT:      Head: Normocephalic and atraumatic.      Right Ear: External ear normal.      Left Ear: External ear normal.      Nose: Nose normal.     Eyes:      General:         Right eye: No discharge.         Left eye: No discharge.      Pupils: Pupils are equal, round, and reactive to light.      Comments: Normal extraocular movements.  Surgical sutures in place  bilaterally on eyelids.  Clean dry intact, no signs of infection.  Small amount of ecchymosis surrounding suture site no erythema drainage or discharge to be suggestive of infection.    Swelling/chemosis of left conjunctiva and sclera    See pictures below with patient with her eyes open and closed.  She is unable to fully close her eyelids bilaterally but left greater than right   Neck:      Trachea: No tracheal deviation.     Cardiovascular:      Rate and Rhythm: Normal rate and regular rhythm.      Heart sounds: Normal heart sounds. No murmur heard.     Comments: Heart rate 70s and regular at my time of examination.  Pulmonary:      Effort: Pulmonary effort is normal. No respiratory distress.      Breath sounds: Normal breath sounds. No stridor.     Musculoskeletal:         General: Normal range of motion.      Cervical back: Normal range of motion and neck supple.     Skin:     General: Skin is warm and dry.      Coloration: Skin is not pale.      Findings: No erythema.     Neurological:      General: No focal deficit present.      Mental Status: She is alert and oriented to person, place, and time.     Psychiatric:         Mood and Affect: Mood is anxious.                       Results Reviewed       None            No orders to display       Procedures    ED Medication and Procedure Management   Prior to Admission Medications   Prescriptions Last Dose Informant Patient Reported? Taking?   CALCIUM-VITAMIN D PO  Self Yes No   Sig: Take 1 tablet by mouth daily   Patient not taking: Reported on 1/31/2024   Multiple Vitamin (MULTIVITAMIN) tablet  Self Yes No   Sig: Take 1 tablet by mouth daily   Patient not taking: Reported on 1/31/2024   TURMERIC CURCUMIN PO  Self Yes No   Sig: Take by mouth   Patient not taking: Reported on 1/31/2024   amoxicillin (AMOXIL) 500 mg capsule   Yes No   Sig: TAKE 4 CAPSULES 1 HOUR PRIOR TO ANY DENTAL PROCEDURE   Patient not taking: Reported on 6/16/2025   hydrocortisone (ANUSOL-HC) 25  mg suppository   No No   Sig: Insert 1 suppository (25 mg total) into the rectum daily at bedtime for 12 days   Patient not taking: Reported on 2025   ibandronate (BONIVA) 150 MG tablet  Self Yes No   Sig: Take 150 mg by mouth every 30 (thirty) days 1st Saturday of the month   lisinopril (ZESTRIL) 2.5 mg tablet  Self Yes No   Sig: Take 2.5 mg by mouth in the morning.   meloxicam (MOBIC) 15 mg tablet  Self Yes No   Sig: Take 15 mg by mouth daily   Patient not taking: Reported on 2024   mesalamine (CANASA) 1,000 mg suppository   No No   Sig: Insert 1 suppository (1,000 mg total) into the rectum daily at bedtime   Patient not taking: Reported on 2025   simvastatin (ZOCOR) 10 mg tablet  Self Yes No   Sig: Take 10 mg by mouth every evening   Patient not taking: Reported on 2025      Facility-Administered Medications: None     Patient's Medications   Discharge Prescriptions    ERYTHROMYCIN (ILOTYCIN) OPHTHALMIC OINTMENT    Place a 1/2 inch ribbon of ointment into the lower eyelid.  3 times a day for 1 week       Start Date: 2025 End Date: --       Order Dose: --       Quantity: 3.5 g    Refills: 0     No discharge procedures on file.  ED SEPSIS DOCUMENTATION   Time reflects when diagnosis was documented in both MDM as applicable and the Disposition within this note       Time User Action Codes Description Comment    2025  1:24 PM Richard Llanos Add [H11.422] Chemosis of left conjunctiva                    [1]   Past Medical History:  Diagnosis Date    Abnormal Pap smear of cervix     Diverticulitis     Diverticulitis of colon     Endometriosis     History of transfusion     no reaction    Hyperlipidemia     Hypertension     Kidney stone     OA (osteoarthritis)     UC (ulcerative colitis) (HCC)    [2]   Past Surgical History:  Procedure Laterality Date    BOWEL RESECTION      2018    BREAST IMPLANT Bilateral      SECTION       SECTION      COLONOSCOPY      x4    EYE  SURGERY      biklat eyelid surgery    FRACTURE SURGERY      right arm     HEMICOLOECTOMY W/ ANASTOMOSIS N/A 8/24/2018    Procedure: LAPAROSCOPIC SIGMOID RESECTION, MOBILIZATION OF splenic flexure;  Surgeon: YENY Michael MD;  Location:  MAIN OR;  Service: Colorectal    HYSTERECTOMY      LAPAROSCOPIC LYSIS INTESTINAL ADHESIONS      NH BLEPHAROPLASTY LOWER EYELID Bilateral 6/25/2025    Procedure: BLEPHAROPLASTY LOWER;  Surgeon: Baldev Castro MD;  Location:  MAIN OR;  Service: Plastics    NH BLEPHAROPLASTY UPPER EYELID W/EXCESSIVE SKIN Bilateral 6/25/2025    Procedure: BLEPHAROPLASTY UPPER;  Surgeon: Baldev Castro MD;  Location:  MAIN OR;  Service: Plastics    NH REPAIR BROW PTOSIS N/A 6/25/2025    Procedure: BROWPLASTY/ BROWLIFT;  Surgeon: Baldev Castro MD;  Location:  MAIN OR;  Service: Plastics    NH SIGMOIDOSCOPY FLX DX W/COLLJ SPEC BR/WA IF PFRMD N/A 8/24/2018    Procedure: SIGMOIDOSCOPY FLEXIBLE;  Surgeon: YENY Michael MD;  Location:  MAIN OR;  Service: Colorectal   [3]   Family History  Problem Relation Name Age of Onset    Hypertension Mother      Hypertension Father      Breast cancer Maternal Grandmother      Breast cancer Maternal Aunt          double mastectomy   [4]   Social History  Tobacco Use    Smoking status: Never    Smokeless tobacco: Never   Vaping Use    Vaping status: Never Used   Substance Use Topics    Alcohol use: Yes     Comment: rarely    Drug use: No        Richard Llanos DO  06/28/25 1410

## 2025-07-15 NOTE — H&P
History and Physical -General Surgery  Stan Haji 46 y o  female MRN: [id number]  Unit/Bed#: ED 25 Encounter: [id number]        Reason for Consult / Principal Problem: abdominal pain    HPI: Stan Haji is a 46y o  year old female with history of laparoscopic sigmoid resection, mobilization of splenic flexure on 18 by Dr Kari Tena presents to ED complaining of epigastric cramping x 5 days associated with nausea and vomiting today  Associated symptoms are bloating  Pain was 11/10 when she came to the ED  Last BM yesterday was normal and then took MOM which resulted in diarrhea  Poor appetite for 2 days  Diffuse gastric and small bowel dilation with a probable transition point in the right lower quadrant suspicious for partial small bowel obstruction  Small amount of fluid in the proximal colon without colonic distention  Diffuse colonic diverticulosis without acute diverticulitis  Review of Systems   Constitutional: Positive for appetite change  Negative for fatigue and fever  Gastrointestinal: Positive for abdominal pain, diarrhea, nausea and vomiting  Negative for abdominal distention, blood in stool and constipation  Skin: Negative for color change  Historical Information   Past Medical History:   Diagnosis Date    Diverticulitis     Endometriosis     Hypertension      Past Surgical History:   Procedure Laterality Date    BREAST IMPLANT Bilateral      SECTION       SECTION      COLONOSCOPY      x4    EYE SURGERY      biklat eyelid surgery    FRACTURE SURGERY      right arm     HEMICOLOECTOMY W/ ANASTOMOSIS N/A 2018    Procedure: LAPAROSCOPIC SIGMOID RESECTION, MOBILIZATION OF splenic flexure;   Surgeon: Madi Jones MD;  Location: [facility];  Service: Colorectal    HYSTERECTOMY      LAPAROSCOPIC LYSIS INTESTINAL ADHESIONS      IA SIGMOIDOSCOPY FLX DX W/COLLJ SPEC BR/WA IF PFRMD N/A 2018    Procedure: Lukas Solano;  Surgeon: Jose F Escobar Loly Torres MD;  Location: [facility];  Service: Colorectal     Social History   History   Alcohol Use No     History   Drug Use No     History   Smoking Status    Never Smoker   Smokeless Tobacco    Never Used     History reviewed  No pertinent family history  Meds/Allergies   Home medications: Zestril 20 mg daily    Current Facility-Administered Medications   Medication Dose Route Frequency    benzocaine (HURRICAINE) 20 % mucosal spray 2 spray  2 spray Mucosal Once    HYDROmorphone (DILAUDID) injection 0 5 mg  0 5 mg Intravenous Once    midazolam (VERSED) injection 1 mg  1 mg Intravenous Once       No Known Allergies    Objective     Blood pressure 114/58, pulse 77, temperature 97 8 °F (36 6 °C), temperature source Oral, resp  rate 18, weight 77 2 kg (170 lb 3 1 oz), SpO2 97 %    No intake or output data in the 24 hours ending 09/10/18 1635    PHYSICAL EXAM  General appearance: alert and oriented, in no acute distress  Skin: Skin color, texture, turgor normal  No rashes or lesions  Head: Normocephalic, without obvious abnormality, NGT in place  Heart: regular rate and rhythm, S1, S2 normal, no murmur, click, rub or gallop  Lungs: clear to auscultation bilaterally  Abdomen: round, well healed incision, tender epigastrium, no rebound or guarding  Neurological: normal without focal findings    Lab Results:   Admission on 09/10/2018   Component Date Value    WBC 09/10/2018 10 27*    RBC 09/10/2018 4 68     Hemoglobin 09/10/2018 13 8     Hematocrit 09/10/2018 41 5     MCV 09/10/2018 89     MCH 09/10/2018 29 5     MCHC 09/10/2018 33 3     RDW 09/10/2018 13 0     MPV 09/10/2018 9 7     Platelets 21/01/8924 344     nRBC 09/10/2018 0     Neutrophils Relative 09/10/2018 85*    Immat GRANS % 09/10/2018 0     Lymphocytes Relative 09/10/2018 8*    Monocytes Relative 09/10/2018 6     Eosinophils Relative 09/10/2018 1     Basophils Relative 09/10/2018 0     Neutrophils Absolute 09/10/2018 8 76*    Immature Grans Absolute 09/10/2018 0 03     Lymphocytes Absolute 09/10/2018 0 77     Monocytes Absolute 09/10/2018 0 60     Eosinophils Absolute 09/10/2018 0 08     Basophils Absolute 09/10/2018 0 03     Sodium 09/10/2018 134*    Potassium 09/10/2018 4 5     Chloride 09/10/2018 97*    CO2 09/10/2018 29     ANION GAP 09/10/2018 8     BUN 09/10/2018 14     Creatinine 09/10/2018 0 95     Glucose 09/10/2018 156*    Calcium 09/10/2018 9 9     AST 09/10/2018 31     ALT 09/10/2018 30     Alkaline Phosphatase 09/10/2018 106     Total Protein 09/10/2018 8 4*    Albumin 09/10/2018 4 3     Total Bilirubin 09/10/2018 0 70     eGFR 09/10/2018 69     Lipase 09/10/2018 117      Imaging Studies: I have personally reviewed pertinent reports  ASSESSMENT/PLAN:  Problem List     Diverticulitis large intestine w/o perforation or abscess w/o bleeding   47 yo F with SBO ~2 weeks s/p laparoscopic sigmoid resection for diverticulitis  · NPO   · IVF  · Pain control  · NGT to suction  · Serial exams     This patient will require  >2 night hospital stay  Counseling / Coordination of Care  Total time spent today  30 minutes  Greater than 50% of total time was spent with the patient and / or family counseling and / or coordination of care  Yes

## (undated) DEVICE — SUT PDS II 3-0 SH 27 IN Z316H

## (undated) DEVICE — SUT VICRYL 0 CT-1 27 IN J260H

## (undated) DEVICE — TRAVELKIT CONTAINS FIRST STEP KIT (200ML EP-4 KIT) AND SOILED SCOPE BAG - 1 KIT: Brand: TRAVELKIT CONTAINS FIRST STEP KIT AND SOILED SCOPE BAG

## (undated) DEVICE — ICE PACK EYE

## (undated) DEVICE — SCD SEQUENTIAL COMPRESSION COMFORT SLEEVE MEDIUM KNEE LENGTH: Brand: KENDALL SCD

## (undated) DEVICE — SUT VICRYL 6-0 P-3 18 IN J492G

## (undated) DEVICE — ENDOSCOPIC LINEAR CUTTER RELOADS GREEN 4.1 MM, 6 ROWS: Brand: ECHELON; ENDOPATH

## (undated) DEVICE — STERILE POLYISOPRENE POWDER-FREE SURGICAL GLOVES: Brand: PROTEXIS

## (undated) DEVICE — SUT MONOCRYL 4-0 PS-2 18 IN Y496G

## (undated) DEVICE — POOLE SUCTION HANDLE: Brand: CARDINAL HEALTH

## (undated) DEVICE — ENSEAL LAPAROSCOPIC TISSUE SEALER G2 CURVED JAW FOR USE WITH G2 GENERATOR 5MM DIAMETER 35CM SHAFT LENGTH: Brand: ENSEAL

## (undated) DEVICE — ACCESS PLATFORM FOR MINIMALLY INVASIVE SURGERY.: Brand: GELPORT® LAPAROSCOPIC  SYSTEM

## (undated) DEVICE — INTENDED FOR TISSUE SEPARATION, AND OTHER PROCEDURES THAT REQUIRE A SHARP SURGICAL BLADE TO PUNCTURE OR CUT.: Brand: BARD-PARKER SAFETY BLADES SIZE 15, STERILE

## (undated) DEVICE — PACK PBDS STERILE LAP LITHOTOMY RF

## (undated) DEVICE — GOWN,SLEEVE,STERILE,W/CSR WRAP,1/P: Brand: MEDLINE

## (undated) DEVICE — INTENDED FOR TISSUE SEPARATION, AND OTHER PROCEDURES THAT REQUIRE A SHARP SURGICAL BLADE TO PUNCTURE OR CUT.: Brand: BARD-PARKER SAFETY BLADES SIZE 11, STERILE

## (undated) DEVICE — PLUMEPEN PRO 10FT

## (undated) DEVICE — SUPER SPONGES,MEDIUM: Brand: KERLIX

## (undated) DEVICE — REM POLYHESIVE ADULT PATIENT RETURN ELECTRODE: Brand: VALLEYLAB

## (undated) DEVICE — ENDOPATH XCEL BLADELESS TROCARS WITH STABILITY SLEEVES: Brand: ENDOPATH XCEL

## (undated) DEVICE — GLOVE INDICATOR PI UNDERGLOVE SZ 8 BLUE

## (undated) DEVICE — ECHELON FLEX 60 ARTICULATING ENDOSCOPIC LINEAR CUTTER (NO CARTRIDGE): Brand: ECHELON FLEX ENDOPATH

## (undated) DEVICE — SUT VICRYL 0 REEL 54 IN J287G

## (undated) DEVICE — PENCILETTE PUSH BUTTON COATED

## (undated) DEVICE — ADHESIVE SKN CLSR HISTOACRYL FLEX 0.5ML LF

## (undated) DEVICE — TOWEL SURG XR DETECT GREEN STRL RFD

## (undated) DEVICE — CHLORAPREP HI-LITE 26ML ORANGE

## (undated) DEVICE — 3000CC GUARDIAN II: Brand: GUARDIAN

## (undated) DEVICE — SUT PROLENE 6-0 P-3 18 IN 8695G

## (undated) DEVICE — TELFA NON-ADHERENT ABSORBENT DRESSING: Brand: TELFA

## (undated) DEVICE — GLOVE SRG BIOGEL 7.5

## (undated) DEVICE — SUT MONOCRYL PLUS 5-0 P-3 18IN MCP493G

## (undated) DEVICE — LAPAROTOMY SPONGE - RF AND X-RAY DETECTABLE PRE-WASHED: Brand: SITUATE

## (undated) DEVICE — INSUFLATION TUBING INSUFLOW (LEXION)

## (undated) DEVICE — DRAPE EQUIPMENT RF WAND

## (undated) DEVICE — USED IN CONJUNCTION WITH A SYRINGE AS AN ADDITIVE DEVICE FOR ASPIRATION FROM MULTI-DOSE MEDICINE VIALS OR INJECTION INTO I.V. SYSTEMS AND PRE-SLIT SEPTUMS COVERING INJECTION SITES.: Brand: SOL-M™ BLUNT FILL NEEDLE

## (undated) DEVICE — IRRIG ENDO FLO TUBING

## (undated) DEVICE — ENDOPATH 5MM CURVED SCISSORS WITH MONOPOLAR CAUTERY: Brand: ENDOPATH

## (undated) DEVICE — TUBING SUCTION 5MM X 12 FT

## (undated) DEVICE — TRAY FOLEY 16FR URIMETER SURESTEP

## (undated) DEVICE — INTENDED FOR TISSUE SEPARATION, AND OTHER PROCEDURES THAT REQUIRE A SHARP SURGICAL BLADE TO PUNCTURE OR CUT.: Brand: BARD-PARKER ® SAFETYLOCK CARBON RIB-BACK BLADES

## (undated) DEVICE — NEEDLE 25G X 1 1/2

## (undated) DEVICE — CURVED INTRALUMINAL STAPLER (ILS) 24 TITANIUM ADJUSTABLE HEIGHT STAPLES

## (undated) DEVICE — CO2 AND WATER TUBING/CAP SET FOR OLYMPUS® SCOPES & UCR: Brand: ERBE

## (undated) DEVICE — TIBURON SPLIT SHEET: Brand: CONVERTORS

## (undated) DEVICE — Device: Brand: DEFENDO AIR/WATER/SUCTION AND BIOPSY VALVE

## (undated) DEVICE — BETHLEHEM UNIVERSAL MINOR GEN: Brand: CARDINAL HEALTH

## (undated) DEVICE — SUT CHROMIC 6-0 790G

## (undated) DEVICE — PREMIUM DRY TRAY LF: Brand: MEDLINE INDUSTRIES, INC.

## (undated) DEVICE — SUT PROLENE 2-0 KS 30 IN 8623H

## (undated) DEVICE — ANTI-FOG SOLUTION WITH FOAM PAD: Brand: DEVON

## (undated) DEVICE — KERLIX BANDAGE ROLL: Brand: KERLIX

## (undated) DEVICE — DISPOSABLE OR TOWEL: Brand: CARDINAL HEALTH

## (undated) DEVICE — SYRINGE 30ML LL

## (undated) DEVICE — UNDYED MONOFILAMENT (POLYDIOXANONE), ABSORBABLE SURGICAL SUTURE: Brand: PDS

## (undated) DEVICE — STRL COTTON TIP APPLCTR 6IN PK: Brand: CARDINAL HEALTH

## (undated) DEVICE — INSULATED NEEDLE ELECTRODE: Brand: EDGE

## (undated) DEVICE — SUT SILK 2-0 TIES 144 IN LA55G

## (undated) DEVICE — SUT PDS II 1 CTX 36 IN Z371T